# Patient Record
Sex: FEMALE | Race: WHITE | NOT HISPANIC OR LATINO | Employment: OTHER | URBAN - NONMETROPOLITAN AREA
[De-identification: names, ages, dates, MRNs, and addresses within clinical notes are randomized per-mention and may not be internally consistent; named-entity substitution may affect disease eponyms.]

---

## 2017-03-14 ENCOUNTER — TELEPHONE (OUTPATIENT)
Dept: OBGYN | Facility: OTHER | Age: 27
End: 2017-03-14

## 2017-03-14 NOTE — TELEPHONE ENCOUNTER
Positive pregnancy test : yes  LMP : 683787 GA: 5 5/7  Prenatal vitamins?: Yes, OTC PNV. Will check folate to be sure 800 mcg or more.  Bleeding?: no  Cramping?: no  1-sided pelvic pain?: no  Advised patient to be seen ASAP if any of the above symptoms.  [facility] appt scheduled with Dr. Benavidez on 3/29 @ 3:10.

## 2017-03-29 ENCOUNTER — PRENATAL OFFICE VISIT (OUTPATIENT)
Dept: OBGYN | Facility: OTHER | Age: 27
End: 2017-03-29
Attending: OBSTETRICS & GYNECOLOGY
Payer: COMMERCIAL

## 2017-03-29 VITALS
BODY MASS INDEX: 21.97 KG/M2 | SYSTOLIC BLOOD PRESSURE: 112 MMHG | HEART RATE: 60 BPM | DIASTOLIC BLOOD PRESSURE: 64 MMHG | WEIGHT: 140 LBS | HEIGHT: 67 IN

## 2017-03-29 DIAGNOSIS — Z34.01 ENCOUNTER FOR SUPERVISION OF NORMAL FIRST PREGNANCY IN FIRST TRIMESTER: Primary | ICD-10-CM

## 2017-03-29 PROCEDURE — 99207 ZZC COMPLICATED OB VISIT: CPT | Mod: 25 | Performed by: OBSTETRICS & GYNECOLOGY

## 2017-03-29 PROCEDURE — 76817 TRANSVAGINAL US OBSTETRIC: CPT | Performed by: OBSTETRICS & GYNECOLOGY

## 2017-03-29 NOTE — PROGRESS NOTES
"Here for doc of dates and viability  /64  Pulse 60  Ht 5' 7\" (1.702 m)  Wt 140 lb (63.5 kg)  LMP 02/02/2017  BMI 21.93 kg/m2      Uterus: normal   Gest. Sac: reg  Yolk Sac: P  Fetal Pole: 7w4d  FHT: +  Fluid: normal  Adnexa: cl on rt  Gest Age: 7w4d  Sc = dates  Done by Vicky Benavidez MD    A: IUP    P: rto 2 wks  rto NOB  Books  11 wk NT etc.    Greater than  25 minutes were spent counseling this patient face to face in addition to the ultrasound.      "

## 2017-03-29 NOTE — MR AVS SNAPSHOT
After Visit Summary   3/29/2017    Johana Duff    MRN: [id number]           Patient Information     Date Of Birth          1990        Visit Information        Provider Department      3/29/2017 3:10 PM Vicky Benavidez MD [facility] [place]        Today's Diagnoses     Encounter for supervision of normal first pregnancy in first trimester    -  1      Care Instructions    1st trimester screening blood work and nuchal translucency ultrasound @ 11-13 6/7 weeks. You will receive a call from ultrasound to schedule this appt. Please call the nurse at [phone] if you are not contacted by 12 weeks with an appointment time.  MSAFP in lab @ 15-16 weeks.  Level 2 ultrasound @ 18-22 weeks. You will receive a call from ultrasound to make this appt. Please contact the nurse if you have not been contacted with an appointment time by 17 weeks.     Return in 2 weeks for a short appointment, and in 4-6 weeks for a long New OB appt.                Follow-ups after your visit        Your next 10 appointments already scheduled     May 01, 2017 10:00 AM CDT   (Arrive by 9:45 AM)   Office Visit with MD [facility] Pelaez ([facility])    [address]  [facility] [address]   [phone]           Bring a current list of meds and any records pertaining to this visit.  For Physicals, please bring immunization records and any forms needing to be filled out.  Please arrive 10 minutes early to complete paperwork.              Who to contact     If you have questions or need follow up information about today's clinic visit or your schedule please contact [facility] directly at [phone].  Normal or non-critical lab and imaging results will be communicated to you by MyChart, letter or phone within 4 business days after the clinic has received the results. If you do not hear from us within 7 days, please contact the clinic through MyChart or phone. If you have  "a critical or abnormal lab result, we will notify you by phone as soon as possible.  Submit refill requests through TraitWare or call your pharmacy and they will forward the refill request to us. Please allow 3 business days for your refill to be completed.          Additional Information About Your Visit        Meridiumhart Information     TraitWare lets you send messages to your doctor, view your test results, renew your prescriptions, schedule appointments and more. To sign up, go to [email or web address] . Click on \"Log in\" on the left side of the screen, which will take you to the Welcome page. Then click on \"Sign up Now\" on the right side of the page.     You will be asked to enter the access code listed below, as well as some personal information. Please follow the directions to create your username and password.     Your access code is: [email or web address]  Expires: 2017  4:37 PM     Your access code will  in 90 days. If you need help or a new code, please call your [place] clinic or [phone].        Care EveryWhere ID     This is your Care EveryWhere ID. This could be used by other organizations to access your [place] medical records  [id number]        Your Vitals Were     Pulse Height Last Period BMI (Body Mass Index)          60 5' 7\" (1.702 m) 2017 21.93 kg/m2         Blood Pressure from Last 3 Encounters:   17 112/64   16 108/60   16 112/62    Weight from Last 3 Encounters:   17 140 lb (63.5 kg)   16 140 lb (63.5 kg)   16 137 lb (62.1 kg)              We Performed the Following     US OB (Clinic Only)        Primary Care Provider Office Phone # Fax #    MARIA LUISA Desir [phone]       [facility] [address]  [place] [address]        Thank you!     Thank you for choosing [facility]  for your care. Our goal is always to provide you with excellent care. Hearing back from our patients is one way we can " continue to improve our services. Please take a few minutes to complete the written survey that you may receive in the mail after your visit with us. Thank you!             Your Updated Medication List - Protect others around you: Learn how to safely use, store and throw away your medicines at www.disposemymeds.org.          This list is accurate as of: 3/29/17  4:37 PM.  Always use your most recent med list.                   Brand Name Dispense Instructions for use    PRENATAL 1 PO

## 2017-03-29 NOTE — PATIENT INSTRUCTIONS
1st trimester screening blood work and nuchal translucency ultrasound @ 11-13 6/7 weeks. You will receive a call from ultrasound to schedule this appt. Please call the nurse at [phone] if you are not contacted by 12 weeks with an appointment time.  MSAFP in lab @ 15-16 weeks.  Level 2 ultrasound @ 18-22 weeks. You will receive a call from ultrasound to make this appt. Please contact the nurse if you have not been contacted with an appointment time by 17 weeks.     Return in 2 weeks for a short appointment, and in 4-6 weeks for a long New OB appt.

## 2017-03-30 ASSESSMENT — ANXIETY QUESTIONNAIRES
1. FEELING NERVOUS, ANXIOUS, OR ON EDGE: NOT AT ALL
6. BECOMING EASILY ANNOYED OR IRRITABLE: NOT AT ALL
GAD7 TOTAL SCORE: 0
7. FEELING AFRAID AS IF SOMETHING AWFUL MIGHT HAPPEN: NOT AT ALL
3. WORRYING TOO MUCH ABOUT DIFFERENT THINGS: NOT AT ALL
5. BEING SO RESTLESS THAT IT IS HARD TO SIT STILL: NOT AT ALL
2. NOT BEING ABLE TO STOP OR CONTROL WORRYING: NOT AT ALL

## 2017-03-30 ASSESSMENT — PATIENT HEALTH QUESTIONNAIRE - PHQ9: 5. POOR APPETITE OR OVEREATING: NOT AT ALL

## 2017-03-31 ASSESSMENT — PATIENT HEALTH QUESTIONNAIRE - PHQ9: SUM OF ALL RESPONSES TO PHQ QUESTIONS 1-9: 3

## 2017-03-31 ASSESSMENT — ANXIETY QUESTIONNAIRES: GAD7 TOTAL SCORE: 0

## 2017-04-12 ENCOUNTER — PRENATAL OFFICE VISIT (OUTPATIENT)
Dept: OBGYN | Facility: OTHER | Age: 27
End: 2017-04-12
Attending: OBSTETRICS & GYNECOLOGY
Payer: COMMERCIAL

## 2017-04-12 VITALS
DIASTOLIC BLOOD PRESSURE: 62 MMHG | WEIGHT: 139 LBS | BODY MASS INDEX: 21.82 KG/M2 | SYSTOLIC BLOOD PRESSURE: 92 MMHG | HEIGHT: 67 IN

## 2017-04-12 DIAGNOSIS — Z34.01 ENCOUNTER FOR SUPERVISION OF NORMAL FIRST PREGNANCY IN FIRST TRIMESTER: Primary | ICD-10-CM

## 2017-04-12 PROCEDURE — 99207 ZZC PRENATAL VISIT: CPT | Mod: 25 | Performed by: OBSTETRICS & GYNECOLOGY

## 2017-04-12 PROCEDURE — 76817 TRANSVAGINAL US OBSTETRIC: CPT | Performed by: OBSTETRICS & GYNECOLOGY

## 2017-04-12 NOTE — PROGRESS NOTES
"Here for doc of dates and viability  BP 92/62  Ht 5' 7\" (1.702 m)  Wt 139 lb (63 kg)  LMP 02/02/2017  BMI 21.77 kg/m2      Uterus: normal  Gest. Sac: reg  Yolk Sac: p  Fetal Pole: 10w3d with good fm  FHT: +  Fluid: normal    Gest Age: 10w3d  Sc = dates  Done by Vicky Benavidez MD    A: IUP    P: rto NOB  11 wk NT etc    Greater than  15 minutes were spent counseling this patient face to face in addition to the ultrasound.      "

## 2017-04-12 NOTE — MR AVS SNAPSHOT
After Visit Summary   4/12/2017    Johana Duff    MRN: [id number]           Patient Information     Date Of Birth          1990        Visit Information        Provider Department      4/12/2017 2:20 PM Vicky Benavidez MD [facility]        Today's Diagnoses     Encounter for supervision of normal first pregnancy in first trimester    -  1      Care Instructions    Keep nuchal translucency ultrasound and 1st trimester screen appointment for 4/24.  Return for New OB visit.        Follow-ups after your visit        Your next 10 appointments already scheduled     Apr 24, 2017  3:00 PM CDT   Radiology with HI ULTRASOUND 2   HI Ultrasound ([facility] )    [address] [place] [address]   [phone]            May 01, 2017 10:00 AM CDT   (Arrive by 9:45 AM)   New Prenatal with Vicky Benavidez MD   [facility] ([facility])    [address] [facility] [address]   [phone]            Jun 20, 2017  2:00 PM CDT   Radiology with HI ULTRASOUND 2   HI Ultrasound ([facility] )    [address] [place] [address]   [phone]              Who to contact     If you have questions or need follow up information about today's clinic visit or your schedule please contact [facility] directly at [phone].  Normal or non-critical lab and imaging results will be communicated to you by MyChart, letter or phone within 4 business days after the clinic has received the results. If you do not hear from us within 7 days, please contact the clinic through MyChart or phone. If you have a critical or abnormal lab result, we will notify you by phone as soon as possible.  Submit refill requests through HDmessaging or call your pharmacy and they will forward the refill request to us. Please allow 3 business days for your refill to be completed.          Additional Information About Your Visit        MyChart Information      "easy2map lets you send messages to your doctor, view your test results, renew your prescriptions, schedule appointments and more. To sign up, go to [email or web address] . Click on \"Log in\" on the left side of the screen, which will take you to the Welcome page. Then click on \"Sign up Now\" on the right side of the page.     You will be asked to enter the access code listed below, as well as some personal information. Please follow the directions to create your username and password.     Your access code is: [email or web address]  Expires: 2017  4:37 PM     Your access code will  in 90 days. If you need help or a new code, please call your [place] clinic or [phone].        Care EveryWhere ID     This is your Care EveryWhere ID. This could be used by other organizations to access your [place] medical records  [id number]        Your Vitals Were     Height Last Period BMI (Body Mass Index)             5' 7\" (1.702 m) 2017 21.77 kg/m2          Blood Pressure from Last 3 Encounters:   17 92/62   17 112/64   16 108/60    Weight from Last 3 Encounters:   17 139 lb (63 kg)   17 140 lb (63.5 kg)   16 140 lb (63.5 kg)              Today, you had the following     No orders found for display       Primary Care Provider Office Phone # Fax #    MARIA LUISA Desir [phone]       [address] [address]        Thank you!     Thank you for choosing [facility]  for your care. Our goal is always to provide you with excellent care. Hearing back from our patients is one way we can continue to improve our services. Please take a few minutes to complete the written survey that you may receive in the mail after your visit with us. Thank you!             Your Updated Medication List - Protect others around you: Learn how to safely use, store and throw away your medicines at www.disposemymeds.org.          This " list is accurate as of: 4/12/17  3:27 PM.  Always use your most recent med list.                   Brand Name Dispense Instructions for use    PRENATAL 1 PO

## 2017-04-12 NOTE — PATIENT INSTRUCTIONS
Keep nuchal translucency ultrasound and 1st trimester screen appointment for 4/24.  Return for New OB visit.

## 2017-04-24 ENCOUNTER — HOSPITAL ENCOUNTER (OUTPATIENT)
Dept: ULTRASOUND IMAGING | Facility: HOSPITAL | Age: 27
Discharge: HOME | End: 2017-04-24
Attending: OBSTETRICS & GYNECOLOGY | Admitting: OBSTETRICS & GYNECOLOGY
Payer: COMMERCIAL

## 2017-04-24 PROCEDURE — 36415 COLL VENOUS BLD VENIPUNCTURE: CPT

## 2017-04-24 PROCEDURE — 76813 OB US NUCHAL MEAS 1 GEST: CPT | Mod: TC

## 2017-05-01 ENCOUNTER — PRENATAL OFFICE VISIT (OUTPATIENT)
Dept: OBGYN | Facility: OTHER | Age: 27
End: 2017-05-01
Attending: OBSTETRICS & GYNECOLOGY
Payer: COMMERCIAL

## 2017-05-01 VITALS
WEIGHT: 141 LBS | DIASTOLIC BLOOD PRESSURE: 52 MMHG | SYSTOLIC BLOOD PRESSURE: 112 MMHG | BODY MASS INDEX: 22.13 KG/M2 | HEIGHT: 67 IN

## 2017-05-01 DIAGNOSIS — Z34.01 ENCOUNTER FOR SUPERVISION OF NORMAL FIRST PREGNANCY IN FIRST TRIMESTER: Primary | ICD-10-CM

## 2017-05-01 LAB
ALBUMIN UR-MCNC: NEGATIVE MG/DL
APPEARANCE UR: CLEAR
BACTERIA #/AREA URNS HPF: ABNORMAL /HPF
BILIRUB UR QL STRIP: NEGATIVE
C TRACH DNA SPEC QL PROBE+SIG AMP: NORMAL
COLOR UR AUTO: YELLOW
ERYTHROCYTE [DISTWIDTH] IN BLOOD BY AUTOMATED COUNT: 11.9 % (ref 10–15)
GLUCOSE UR STRIP-MCNC: NEGATIVE MG/DL
HCT VFR BLD AUTO: 40.5 % (ref 35–47)
HGB BLD-MCNC: 14.2 G/DL (ref 11.7–15.7)
HGB UR QL STRIP: NEGATIVE
KETONES UR STRIP-MCNC: NEGATIVE MG/DL
LEUKOCYTE ESTERASE UR QL STRIP: NEGATIVE
MCH RBC QN AUTO: 31.6 PG (ref 26.5–33)
MCHC RBC AUTO-ENTMCNC: 35.1 G/DL (ref 31.5–36.5)
MCV RBC AUTO: 90 FL (ref 78–100)
MUCOUS THREADS #/AREA URNS LPF: PRESENT /LPF
N GONORRHOEA DNA SPEC QL PROBE+SIG AMP: NORMAL
NITRATE UR QL: NEGATIVE
PH UR STRIP: 5 PH (ref 4.7–8)
PLATELET # BLD AUTO: 232 10E9/L (ref 150–450)
RBC # BLD AUTO: 4.5 10E12/L (ref 3.8–5.2)
RBC #/AREA URNS AUTO: <1 /HPF (ref 0–2)
SP GR UR STRIP: 1.01 (ref 1–1.03)
URN SPEC COLLECT METH UR: ABNORMAL
UROBILINOGEN UR STRIP-MCNC: NORMAL MG/DL (ref 0–2)
WBC # BLD AUTO: 12.8 10E9/L (ref 4–11)
WBC #/AREA URNS AUTO: 1 /HPF (ref 0–2)

## 2017-05-01 PROCEDURE — 99207 ZZC FIRST OB VISIT: CPT | Performed by: OBSTETRICS & GYNECOLOGY

## 2017-05-01 PROCEDURE — 81001 URINALYSIS AUTO W/SCOPE: CPT | Performed by: OBSTETRICS & GYNECOLOGY

## 2017-05-01 PROCEDURE — 36415 COLL VENOUS BLD VENIPUNCTURE: CPT | Performed by: OBSTETRICS & GYNECOLOGY

## 2017-05-01 PROCEDURE — 85027 COMPLETE CBC AUTOMATED: CPT | Performed by: OBSTETRICS & GYNECOLOGY

## 2017-05-01 NOTE — PROGRESS NOTES
[facility] Website verified for patient immunization history.  Immunization History   Administered Date(s) Administered     Tdap (Adacel,Boostrix) 09/07/2011    patient signed release for immunization records   12 Week Visit    Patient education provided on the following:  Benefits of breastfeeding  Importance of early skin-to-skin contact    We reviewed the [place] [employer] Prenatal Toolkit in the [facility] Resource Book.  Patient questions and concerns addressed and reviewed. Support and encouragement provided.  HERNANDEZ TANG

## 2017-05-01 NOTE — MR AVS SNAPSHOT
After Visit Summary   5/1/2017    Johana Duff    MRN: [id number]           Patient Information     Date Of Birth          1990        Visit Information        Provider Department      5/1/2017 10:00 AM Vicky Benavidez MD [facility]        Today's Diagnoses     Encounter for supervision of normal first pregnancy in first trimester    -  1      Care Instructions    Lab today.  1st trimester screening blood work and nuchal translucency ultrasound @ 11-13 6/7 weeks. You will receive a call from ultrasound to schedule this appt. Please call the nurse at [phone] if you are not contacted by 12 weeks with an appointment time.  MSAFP in lab @ 15-16 weeks. Can do with next OB appointment.  Level 2 ultrasound @ 18-22 weeks. You will receive a call from ultrasound to make this appt. Please contact the nurse if you have not been contacted with an appointment time by 17 weeks.   Tdap vaccine at 27 weeks  Flu shot in fall  Return to clinic in 4 weeks. Stop at lab first for MSAFP test.           Follow-ups after your visit        Your next 10 appointments already scheduled     Jun 20, 2017  2:00 PM CDT   Radiology with HI ULTRASOUND 2   [facility] Ultrasound ([facility] )    [address] [place] [address]   [phone]              Who to contact     If you have questions or need follow up information about today's clinic visit or your schedule please contact [facility] directly at [phone].  Normal or non-critical lab and imaging results will be communicated to you by MyChart, letter or phone within 4 business days after the clinic has received the results. If you do not hear from us within 7 days, please contact the clinic through MyChart or phone. If you have a critical or abnormal lab result, we will notify you by phone as soon as possible.  Submit refill requests through Appington or call your pharmacy and they will forward the refill request to us.  "Please allow 3 business days for your refill to be completed.          Additional Information About Your Visit        MyChart Information     Hanzo Archiveshart lets you send messages to your doctor, view your test results, renew your prescriptions, schedule appointments and more. To sign up, go to [email or web address] . Click on \"Log in\" on the left side of the screen, which will take you to the Welcome page. Then click on \"Sign up Now\" on the right side of the page.     You will be asked to enter the access code listed below, as well as some personal information. Please follow the directions to create your username and password.     Your access code is: [email or web address]  Expires: 2017  4:37 PM     Your access code will  in 90 days. If you need help or a new code, please call your [place] clinic or [phone].        Care EveryWhere ID     This is your Care EveryWhere ID. This could be used by other organizations to access your [place] medical records  [id number]        Your Vitals Were     Height Last Period BMI (Body Mass Index)             5' 7\" (1.702 m) 2017 22.08 kg/m2          Blood Pressure from Last 3 Encounters:   17 112/52   17 92/62   17 112/64    Weight from Last 3 Encounters:   17 141 lb (64 kg)   17 139 lb (63 kg)   17 140 lb (63.5 kg)              We Performed the Following     CBC with platelets     Chlamydia trachomatis PCR     Neisseria gonorrhoeae PCR     UA with Microscopic reflex to Culture        Primary Care Provider Office Phone # Fax #    MARIA LUISA Desir [phone]       [facility] [address] [address]        Thank you!     Thank you for choosing [facility]  for your care. Our goal is always to provide you with excellent care. Hearing back from our patients is one way we can continue to improve our services. Please take a few minutes to complete the written survey that you may " receive in the mail after your visit with us. Thank you!             Your Updated Medication List - Protect others around you: Learn how to safely use, store and throw away your medicines at www.disposemymeds.org.          This list is accurate as of: 5/1/17 10:23 AM.  Always use your most recent med list.                   Brand Name Dispense Instructions for use    PRENATAL 1 PO

## 2017-05-01 NOTE — PATIENT INSTRUCTIONS
Lab today.  1st trimester screening blood work and nuchal translucency ultrasound @ 11-13 6/7 weeks. You will receive a call from ultrasound to schedule this appt. Please call the nurse at [phone] if you are not contacted by 12 weeks with an appointment time.  MSAFP in lab @ 15-16 weeks. Can do with next OB appointment.  Level 2 ultrasound @ 18-22 weeks. You will receive a call from ultrasound to make this appt. Please contact the nurse if you have not been contacted with an appointment time by 17 weeks.   Tdap vaccine at 27 weeks  Flu shot in fall  Return to clinic in 4 weeks. Stop at lab first for MSAFP test.

## 2017-05-01 NOTE — PROGRESS NOTES
NEW OB VISIT  Johana Duff is a 26 year old  at 12w4d presenting for a new ob visit.      Currently taking PNV? y    MEDICAL HISTORY:  Diabetes: n  Hypertension: n  Heart Disease: n  Autoimmune disorder: n  Kidney Disease/UTI: n  Neurologic Disease/Epilepsy: n  Psychiatric Disease: n  Depression/Postpartum Depression: n  Varicositites/Phlebitis: n  Hepatitis/Liver Disease: n  Thyroid Dysfunction: n  Trauma/Violence: n  History of Blood Transfusion: n  Tobacco Use: n  Alcohol Use: n  Illicit/Recreational Drugs: n  D (Rh Sensitized): n  Pulmonary Disease (TB/Asthma): n  Drug/Latex Allergies/Reactions: food dyes  Breast: n  GYN Surgery: n  Operations/Hospitalizations: wisdom teeth  Anesthetic Complications: n  History of Abnormal Pap: n  Uterine Anomalies/CHRISSIE: n  Infertility: n  ART Treatment: n  Relevant Family History: n   Other/Comments: n    INFECTION HISTORY:  Live with someone with TB or exposed to TB: n  Patient or Partner has history of Genital Herpes: n  Rash or viral illness or fever since LMP: n  Hepatitis B or C: n  History of STI (Gonorrhea, Chlamydia, HPV, HIV, Syphilis): n  Zika exposure n  Other: n  Cats n    BABY DOC: MV             Breast feeding: y RD y      IMMUNIZATION HISTORY:  Chicken Pox: y  Flu Vaccine:  In fall  Pneumococcal if smoker or RAD:  na  Tdap: in 3  Gardasil: n  Other/comments: n    FAMILY HISTORY  Diabetes: n  Hypertension: mgf  CVA/Stroke: mgm  Lupus: n  Cancers: Breast  n ovarian n,colon n,uterine: n           Genetics Screening/Teratology Counseling:  Includes Patient, Baby's Father, or anyone in either family with:  Patient's age 35 years or older as of estimated date of delivery:  n  Thalassemia: MCV less than 80: n  Neural Tube defects: n  Congenital Heart Defects:fob with heart murmur he grew out of  Down syndrome: n  Diaz-Sachs: n  Canavan Disease: n  Familial Dysautonomia: n  Sickle Cell Disease or Trait: n  Hemophilia or other blood disorders: n  Muscular  "Dystrophy: n  Cystic Fibrosis: n  Yazoo's Chorea: n  Mental Retardation or Autism: n  Other genetic or chromosomal disorders: n  Maternal Metabolic Disorder (Type 1 DM, PKU): n  Patient or baby's father with birth defects not listed above: n  Recurrent pregnancy loss or stillbirth: n  Medications (Supplements, drugs)/ Illicit/ Recreational drugs/ Alcohol since LMP: n  Other/Comments: n    Review Of Systems:   C:     NEGATIVE for fever, chills, change in weight  I:       NEGATIVE for worrisome rashes, moles or lesions  E:     NEGATIVE for vision changes or irritation  E/M: NEGATIVE for ear, mouth and throat problems  R:     NEGATIVE for significant cough or SOB  CV:   NEGATIVE for chest pain, palpitations or peripheral edema  GI:     NEGATIVE for unusual nausea, abdominal pain, heartburn, or change in bowel   :   NEGATIVE for frequency, dysuria, hematuria, vaginal discharge or bleeding  M:     NEGATIVE for significant arthralgias or myalgia  N:      NEGATIVE for weakness, dizziness or paresthesias  E:      NEGATIVE for temperature intolerance, skin/hair changes  P:      NEGATIVE for changes in mood or affect.     PHYSICAL EXAM:   /52  Ht 5' 7\" (1.702 m)  Wt 141 lb (64 kg)  LMP 02/02/2017  BMI 22.08 kg/m2   BMI: Body mass index is 22.08 kg/(m^2).  Constitutional: healthy, alert and no distress  Head: Normocephalic. No masses, lesions, tenderness or abnormalities  Neck: Neck supple. Trachea midline. No adenopathy. Thyroid symmetric, normal size.   Cardiovascular: RRR.   Respiratory: lungs clear   Breast: Breasts reveal mild symmetric fibrocystic densities, but there are no dominant, discrete, fixed or suspicious masses found.  Gastrointestinal: Abdomen soft, non-tender, non-distended. No masses, organomegaly.  Pelvic:  Vulva:  No external lesions, normal female hair distribution, no inguinal adenopathy.    Urethra:  Midline, non-tender, well supported, no discharge  Vagina:  Moist, pink, no abnormal " discharge, no lesions  Uterus:   Gravid , non-tender  Ovaries:  No masses appreciated  Rectal Exam: deferred    Musculoskeletal: extremities normal  Skin: no suspicious lesions or rashes  Psychiatric: Affect appropriate, cooperative,mentation appears normal.     Risk assessment done. Level is   mod    ASSESSMENT:   IUP    PLAN:  HP,mscc with micro, transfer CT to chart  11-13 weeks 1st trimester NT/Bloodwork  16 wk MSAFP  Cell free DNA discussed  Level II Ultrasound at 20 weeks   Tdap at 27 weeks  Check MIIC  Flu shot in fall  RTO 4 wks and do MSAFP    Greater than 25 were spent in face to face counseling and interview by me for this initial new ob visit.  Vicky Benavidez MD

## 2017-05-30 ENCOUNTER — PRENATAL OFFICE VISIT (OUTPATIENT)
Dept: OBGYN | Facility: OTHER | Age: 27
End: 2017-05-30
Attending: OBSTETRICS & GYNECOLOGY
Payer: COMMERCIAL

## 2017-05-30 VITALS — DIASTOLIC BLOOD PRESSURE: 58 MMHG | SYSTOLIC BLOOD PRESSURE: 112 MMHG | WEIGHT: 150 LBS | BODY MASS INDEX: 23.49 KG/M2

## 2017-05-30 DIAGNOSIS — Z34.01 ENCOUNTER FOR SUPERVISION OF NORMAL FIRST PREGNANCY IN FIRST TRIMESTER: ICD-10-CM

## 2017-05-30 DIAGNOSIS — Z34.01 ENCOUNTER FOR SUPERVISION OF NORMAL FIRST PREGNANCY IN FIRST TRIMESTER: Primary | ICD-10-CM

## 2017-05-30 PROCEDURE — 82105 ALPHA-FETOPROTEIN SERUM: CPT | Mod: 90 | Performed by: OBSTETRICS & GYNECOLOGY

## 2017-05-30 PROCEDURE — 99207 ZZC PRENATAL VISIT: CPT | Performed by: OBSTETRICS & GYNECOLOGY

## 2017-05-30 PROCEDURE — 99000 SPECIMEN HANDLING OFFICE-LAB: CPT | Performed by: OBSTETRICS & GYNECOLOGY

## 2017-05-30 PROCEDURE — 36415 COLL VENOUS BLD VENIPUNCTURE: CPT | Performed by: OBSTETRICS & GYNECOLOGY

## 2017-05-30 NOTE — MR AVS SNAPSHOT
"              After Visit Summary   5/30/2017    Johana Duff    MRN: [id number]           Patient Information     Date Of Birth          1990        Visit Information        Provider Department      5/30/2017 3:00 PM Vicky Benavidez MD [facility]        Today's Diagnoses     Encounter for supervision of normal first pregnancy in first trimester    -  1      Care Instructions    Return to clinic in 4 weeks.            Follow-ups after your visit        Your next 10 appointments already scheduled     Jun 20, 2017  2:00 PM CDT   Radiology with HI ULTRASOUND 2   HI Ultrasound ([facility] )    [address] [place] [address]   [phone]              Who to contact     If you have questions or need follow up information about today's clinic visit or your schedule please contact [facility] directly at [phone].  Normal or non-critical lab and imaging results will be communicated to you by MyChart, letter or phone within 4 business days after the clinic has received the results. If you do not hear from us within 7 days, please contact the clinic through MyChart or phone. If you have a critical or abnormal lab result, we will notify you by phone as soon as possible.  Submit refill requests through [email or web address] or call your pharmacy and they will forward the refill request to us. Please allow 3 business days for your refill to be completed.          Additional Information About Your Visit        Bloodhoundhart Information     [email or web address] lets you send messages to your doctor, view your test results, renew your prescriptions, schedule appointments and more. To sign up, go to [email or web address] . Click on \"Log in\" on the left side of the screen, which will take you to the Welcome page. Then click on \"Sign up Now\" on the right side of the page.     You will be asked to enter the access code listed below, as well as some personal information. Please follow the directions to " create your username and password.     Your access code is: [email or web address]  Expires: 2017  4:37 PM     Your access code will  in 90 days. If you need help or a new code, please call your [place] clinic or [phone].        Care EveryWhere ID     This is your Care EveryWhere ID. This could be used by other organizations to access your [place] medical records  [id number]        Your Vitals Were     Last Period BMI (Body Mass Index)                2017 23.49 kg/m2           Blood Pressure from Last 3 Encounters:   17 112/58   17 112/52   17 92/62    Weight from Last 3 Encounters:   17 150 lb (68 kg)   17 141 lb (64 kg)   17 139 lb (63 kg)              Today, you had the following     No orders found for display       Primary Care Provider Office Phone # Fax #    MARIA LUISA Desir [phone]       [facility] [address] [place] [address]        Thank you!     Thank you for choosing [facility]  for your care. Our goal is always to provide you with excellent care. Hearing back from our patients is one way we can continue to improve our services. Please take a few minutes to complete the written survey that you may receive in the mail after your visit with us. Thank you!             Your Updated Medication List - Protect others around you: Learn how to safely use, store and throw away your medicines at www.disposemymeds.org.          This list is accurate as of: 17  3:53 PM.  Always use your most recent med list.                   Brand Name Dispense Instructions for use    PRENATAL 1 PO

## 2017-06-02 LAB
# FETUSES US: NORMAL
AFP ADJ MOM AMN: 0.97
AFP SERPL-MCNC: 37 NG/ML
AGE - REPORTED: 29
DATING METHOD: NORMAL
DIABETIC AT CONCEPTION: NO
FAMILY MEMBER DISEASES HX: NO
FAMILY MEMBER DISEASES HX: NO
GA METHOD: NORMAL
GA: 16.71 WK
HX OF HEREDITARY DISORDERS: NO
IDDM PATIENT QL: NO
INTEGRATED SCN PATIENT-IMP: NORMAL
LMP START DATE: NORMAL
PREV HX CHROMOSOME ABNORMALITY: NO
SPECIMEN DRAWN SERPL: NORMAL
TWINS: NORMAL

## 2017-06-20 ENCOUNTER — HOSPITAL ENCOUNTER (OUTPATIENT)
Dept: ULTRASOUND IMAGING | Facility: CLINIC | Age: 27
End: 2017-06-20
Attending: OBSTETRICS & GYNECOLOGY
Payer: COMMERCIAL

## 2017-06-20 ENCOUNTER — HOSPITAL ENCOUNTER (OUTPATIENT)
Dept: ULTRASOUND IMAGING | Facility: HOSPITAL | Age: 27
Discharge: HOME | End: 2017-06-20
Attending: OBSTETRICS & GYNECOLOGY | Admitting: OBSTETRICS & GYNECOLOGY
Payer: COMMERCIAL

## 2017-06-20 DIAGNOSIS — O26.90 PREGNANCY RELATED CONDITION, UNSPECIFIED TRIMESTER: ICD-10-CM

## 2017-06-20 PROCEDURE — 76811 OB US DETAILED SNGL FETUS: CPT | Mod: TC

## 2017-07-05 ENCOUNTER — PRENATAL OFFICE VISIT (OUTPATIENT)
Dept: OBGYN | Facility: OTHER | Age: 27
End: 2017-07-05
Attending: OBSTETRICS & GYNECOLOGY
Payer: COMMERCIAL

## 2017-07-05 VITALS
HEIGHT: 67 IN | WEIGHT: 154 LBS | SYSTOLIC BLOOD PRESSURE: 102 MMHG | BODY MASS INDEX: 24.17 KG/M2 | DIASTOLIC BLOOD PRESSURE: 62 MMHG

## 2017-07-05 DIAGNOSIS — Z34.01 ENCOUNTER FOR SUPERVISION OF NORMAL FIRST PREGNANCY IN FIRST TRIMESTER: Primary | ICD-10-CM

## 2017-07-05 PROCEDURE — 99207 ZZC PRENATAL VISIT: CPT | Performed by: OBSTETRICS & GYNECOLOGY

## 2017-07-05 NOTE — MR AVS SNAPSHOT
"              After Visit Summary   2017    Johana Duff    MRN: [id number]           Patient Information     Date Of Birth          1990        Visit Information        Provider Department      2017 1:30 PM iVcky Benavidez MD [facility]        Today's Diagnoses     Encounter for supervision of normal first pregnancy in first trimester    -  1       Follow-ups after your visit        Who to contact     If you have questions or need follow up information about today's clinic visit or your schedule please contact [facility] directly at [phone].  Normal or non-critical lab and imaging results will be communicated to you by 3KeyIthart, letter or phone within 4 business days after the clinic has received the results. If you do not hear from us within 7 days, please contact the clinic through Seat 14At or phone. If you have a critical or abnormal lab result, we will notify you by phone as soon as possible.  Submit refill requests through LIVELENZ or call your pharmacy and they will forward the refill request to us. Please allow 3 business days for your refill to be completed.          Additional Information About Your Visit        MyChart Information     LIVELENZ lets you send messages to your doctor, view your test results, renew your prescriptions, schedule appointments and more. To sign up, go to [email or web address] . Click on \"Log in\" on the left side of the screen, which will take you to the Welcome page. Then click on \"Sign up Now\" on the right side of the page.     You will be asked to enter the access code listed below, as well as some personal information. Please follow the directions to create your username and password.     Your access code is: [email or web address]  Expires: 10/3/2017  2:05 PM     Your access code will  in 90 days. If you need help or a new code, please call your [place] clinic or [phone].        Care EveryWhere ID     This is your Care " "EveryWhere ID. This could be used by other organizations to access your [place] medical records  [id number]        Your Vitals Were     Height Last Period BMI (Body Mass Index)             5' 7\" (1.702 m) 02/02/2017 24.12 kg/m2          Blood Pressure from Last 3 Encounters:   07/05/17 102/62   05/30/17 112/58   05/01/17 112/52    Weight from Last 3 Encounters:   07/05/17 154 lb (69.9 kg)   05/30/17 150 lb (68 kg)   05/01/17 141 lb (64 kg)              Today, you had the following     No orders found for display       Primary Care Provider Office Phone # Fax #    MARIA LUISA Desir [phone]       [facility] [address] [facility] [address]  [facility] [address]        Equal Access to Services     ASAEL GARCIA : Hadii aad ku hadasho Soomaali, waaxda luqadaha, qaybta kaalmada adeegyada, suellen looneyin haymarilynn brandy finch . So [facility] [phone].    ATENCIÓN: Si habla español, tiene a thomas disposición servicios gratuitos de asistencia lingüística. Llame al [phone].    We comply with applicable federal civil rights laws and [place] laws. We do not discriminate on the basis of race, color, national origin, age, disability sex, sexual orientation or gender identity.            Thank you!     Thank you for choosing [facility]  for your care. Our goal is always to provide you with excellent care. Hearing back from our patients is one way we can continue to improve our services. Please take a few minutes to complete the written survey that you may receive in the mail after your visit with us. Thank you!             Your Updated Medication List - Protect others around you: Learn how to safely use, store and throw away your medicines at www.disposemymeds.org.          This list is accurate as of: 7/5/17  2:05 PM.  Always use your most recent med list.                   Brand Name Dispense Instructions for use Diagnosis    PRENATAL 1 PO       Annual physical exam, Family planning    "

## 2017-07-05 NOTE — NURSING NOTE
"Chief Complaint   Patient presents with     Prenatal Care       Initial /62  Ht 5' 7\" (1.702 m)  Wt 154 lb (69.9 kg)  LMP 02/02/2017  BMI 24.12 kg/m2 Estimated body mass index is 24.12 kg/(m^2) as calculated from the following:    Height as of this encounter: 5' 7\" (1.702 m).    Weight as of this encounter: 154 lb (69.9 kg).  Medication Reconciliation: complete     LEE PIERRE      "

## 2017-07-19 ENCOUNTER — PRENATAL OFFICE VISIT (OUTPATIENT)
Dept: OBGYN | Facility: OTHER | Age: 27
End: 2017-07-19
Attending: ADVANCED PRACTICE MIDWIFE
Payer: COMMERCIAL

## 2017-07-19 VITALS
SYSTOLIC BLOOD PRESSURE: 106 MMHG | HEIGHT: 67 IN | OXYGEN SATURATION: 96 % | DIASTOLIC BLOOD PRESSURE: 60 MMHG | BODY MASS INDEX: 24.48 KG/M2 | WEIGHT: 156 LBS | HEART RATE: 85 BPM

## 2017-07-19 DIAGNOSIS — Z34.02 ENCOUNTER FOR SUPERVISION OF NORMAL FIRST PREGNANCY IN SECOND TRIMESTER: Primary | ICD-10-CM

## 2017-07-19 PROCEDURE — 99207 ZZC PRENATAL VISIT: CPT | Performed by: ADVANCED PRACTICE MIDWIFE

## 2017-07-19 ASSESSMENT — PAIN SCALES - GENERAL: PAINLEVEL: NO PAIN (0)

## 2017-07-19 NOTE — PROGRESS NOTES
Anticipatory Guidance  care in hospital  Respiratory therapy called for all deliveries  Skin to skin  Immunizations/meds   -Hepatitis B   -Erythromycin   -Vitamin K  Screening   -Hearing   -CCHD   -Bilirubin   -Metabolic  Rooming in  Feeding:  Breast  Car seats  Provider/appointments     KATIUSKA Overton, CNM

## 2017-07-19 NOTE — NURSING NOTE
"Chief Complaint   Patient presents with     Consult     [place] Consult/Pramod Pt        Initial /60 (BP Location: Left arm, Patient Position: Chair, Cuff Size: Adult Regular)  Pulse 85  Ht 5' 7\" (1.702 m)  Wt 156 lb (70.8 kg)  LMP 2017  SpO2 96%  BMI 24.43 kg/m2 Estimated body mass index is 24.43 kg/(m^2) as calculated from the following:    Height as of this encounter: 5' 7\" (1.702 m).    Weight as of this encounter: 156 lb (70.8 kg).  Medication Reconciliation: phuong Win      "

## 2017-07-19 NOTE — MR AVS SNAPSHOT
"              After Visit Summary   7/19/2017    Johana Duff    MRN: [id number]           Patient Information     Date Of Birth          1990        Visit Information        Provider Department      7/19/2017 1:30 PM Bob Riggs APRN [facility]        Care Instructions    Return to office for prenatal care with Dr. Benavidez.          Follow-ups after your visit        Your next 10 appointments already scheduled     Aug 01, 2017  2:10 PM CDT   (Arrive by 1:55 PM)   ESTABLISHED PRENATAL with Vicky Benavidez MD   [facility] ([facility] )    [address]  [facility] [address]   [phone]              Who to contact     If you have questions or need follow up information about today's clinic visit or your schedule please contact [facility] directly at [phone].  Normal or non-critical lab and imaging results will be communicated to you by MyChart, letter or phone within 4 business days after the clinic has received the results. If you do not hear from us within 7 days, please contact the clinic through MyChart or phone. If you have a critical or abnormal lab result, we will notify you by phone as soon as possible.  Submit refill requests through [employer] or call your pharmacy and they will forward the refill request to us. Please allow 3 business days for your refill to be completed.          Additional Information About Your Visit        MyChart Information     [employer] lets you send messages to your doctor, view your test results, renew your prescriptions, schedule appointments and more. To sign up, go to [email or web address] . Click on \"Log in\" on the left side of the screen, which will take you to the Welcome page. Then click on \"Sign up Now\" on the right side of the page.     You will be asked to enter the access code listed below, as well as some personal information. Please follow the directions to create your username and " "password.     Your access code is: [email or web address]  Expires: 10/3/2017  2:05 PM     Your access code will  in 90 days. If you need help or a new code, please call your [place] clinic or [phone].        Care EveryWhere ID     This is your Care EveryWhere ID. This could be used by other organizations to access your [place] medical records  [id number]        Your Vitals Were     Pulse Height Last Period Pulse Oximetry BMI (Body Mass Index)       85 5' 7\" (1.702 m) 2017 96% 24.43 kg/m2        Blood Pressure from Last 3 Encounters:   17 106/60   17 102/62   17 112/58    Weight from Last 3 Encounters:   17 156 lb (70.8 kg)   17 154 lb (69.9 kg)   17 150 lb (68 kg)              Today, you had the following     No orders found for display       Primary Care Provider Office Phone # Fax #    MARIA LUISA Desir [phone]       [facility] [address]  [place] [address]        Equal Access to Services     JOHN GARCIA AH: Hadii aad ku hadasho Soomaali, waaxda luqadaha, qaybta kaalmada adeegyada, suellen looneyin elion brandy bentley lajuliocesar ah. So [facility] [phone].    ATENCIÓN: Si habla español, tiene a thomas disposición servicios gratuitos de asistencia lingüística. Jamie al [phone].    We comply with applicable federal civil rights laws and [place] laws. We do not discriminate on the basis of race, color, national origin, age, disability sex, sexual orientation or gender identity.            Thank you!     [facility]  [facility] your [facility]. Our goal is always to provide you with excellent care. Hearing back from our patients is one way we can continue to improve our services. Please take a few minutes to complete the written survey that you may receive in the mail after your visit with us. Thank you!             Your Updated Medication List - Protect others around you: Learn how to safely use, store and throw " away your medicines at www.disposemymeds.org.          This list is accurate as of: 7/19/17  2:33 PM.  Always use your most recent med list.                   Brand Name Dispense Instructions for use Diagnosis    PRENATAL 1 PO       Annual physical exam, Family planning

## 2017-08-01 ENCOUNTER — PRENATAL OFFICE VISIT (OUTPATIENT)
Dept: OBGYN | Facility: OTHER | Age: 27
End: 2017-08-01
Attending: OBSTETRICS & GYNECOLOGY
Payer: COMMERCIAL

## 2017-08-01 VITALS — BODY MASS INDEX: 24.9 KG/M2 | WEIGHT: 159 LBS | SYSTOLIC BLOOD PRESSURE: 102 MMHG | DIASTOLIC BLOOD PRESSURE: 52 MMHG

## 2017-08-01 DIAGNOSIS — Z34.01 ENCOUNTER FOR SUPERVISION OF NORMAL FIRST PREGNANCY IN FIRST TRIMESTER: Primary | ICD-10-CM

## 2017-08-01 PROCEDURE — 99207 ZZC PRENATAL VISIT: CPT | Performed by: OBSTETRICS & GYNECOLOGY

## 2017-08-01 NOTE — PATIENT INSTRUCTIONS
Return to clinic in 2 weeks  Lab first next visit.  If you think your bag of water is broken; have bleeding like a period; think you are in labor; or are worried about your baby's movement, please call the labor and delivery unit at [phone].

## 2017-08-01 NOTE — MR AVS SNAPSHOT
After Visit Summary   8/1/2017    Johana Duff    MRN: [id number]           Patient Information     Date Of Birth          1990        Visit Information        Provider Department      8/1/2017 2:10 PM Vicky Benavidez MD [facility]        Today's Diagnoses     Encounter for supervision of normal first pregnancy in first trimester    -  1      Care Instructions    Return to clinic in 2 weeks  Lab first next visit.  If you think your bag of water is broken; have bleeding like a period; think you are in labor; or are worried about your baby's movement, please call the labor and delivery unit at [phone].            Follow-ups after your visit        Your next 10 appointments already scheduled     Aug 15, 2017  8:30 AM CDT   LAB with HC LAB   [facility] ([facility] )    [address] [place] [address]   [phone]            Aug 15, 2017  8:50 AM CDT   (Arrive by 8:35 AM)   ESTABLISHED PRENATAL with Vicky Benavidez MD   [facility] ([facility] )    [address] [place] [address]   [phone]              Future tests that were ordered for you today     Open Future Orders        Priority Expected Expires Ordered    Glucose tolerance gest screen 1 hour Routine 8/14/2017 9/1/2017 8/1/2017    CBC with platelets Routine 8/14/2017 9/1/2017 8/1/2017    UA with Microscopic reflex to Culture Routine 8/14/2017 9/1/2017 8/1/2017            Who to contact     If you have questions or need follow up information about today's clinic visit or your schedule please contact [facility] at [phone].  Normal or non-critical lab and imaging results will be communicated to you by MyChart, letter or phone within 4 business days after the clinic has received the results. If you do not hear from us within 7 days, please contact the clinic through MyChart or phone. If you have a critical or  "abnormal lab result, we will notify you by phone as soon as possible.  Submit refill requests through SHARKMARX or call your pharmacy and they will forward the refill request to us. Please allow 3 business days for your refill to be completed.          Additional Information About Your Visit        HipSwaphart Information     SHARKMARX lets you send messages to your doctor, view your test results, renew your prescriptions, schedule appointments and more. To sign up, go to [email or web address] . Click on \"Log in\" on the left side of the screen, which will take you to the Welcome page. Then click on \"Sign up Now\" on the right side of the page.     You will be asked to enter the access code listed below, as well as some personal information. Please follow the directions to create your username and password.     Your access code is: [email or web address]  Expires: 10/3/2017  2:05 PM     Your access code will  in 90 days. If you need help or a new code, please call your [place] clinic or [phone].        Care EveryWhere ID     This is your Care EveryWhere ID. This could be used by other organizations to access your [place] medical records  [id number]        Your Vitals Were     Last Period BMI (Body Mass Index)                2017 24.9 kg/m2           Blood Pressure from Last 3 Encounters:   17 102/52   17 106/60   17 102/62    Weight from Last 3 Encounters:   17 159 lb (72.1 kg)   17 156 lb (70.8 kg)   17 154 lb (69.9 kg)               Primary Care Provider Office Phone # Fax #    MARIA LUISA Desir [phone]       [facility] [place] [address] [address]        Equal Access to Services     [facility] JOSE : Reyna Burks, wadevonteda luqadaha, qaybta kaalmada luz maria, suellen saleh. So [facility] [phone].    ATENCIÓN: Si habla español, tiene a thomas disposición servicios gratuitos de asistencia " lingüística. Jamie al [phone].    We comply with applicable federal civil rights laws and [place] laws. We do not discriminate on the basis of race, color, national origin, age, disability sex, sexual orientation or gender identity.            Thank you!     [facility]  [facility] your [facility]. Our goal is always to provide you with excellent care. Hearing back from our patients is one way we can continue to improve our services. Please take a few minutes to complete the written survey that you may receive in the mail after your visit with us. Thank you!             Your Updated Medication List - Protect others around you: Learn how to safely use, store and throw away your medicines at www.disposemymeds.org.          This list is accurate as of: 8/1/17  3:26 PM.  Always use your most recent med list.                   Brand Name Dispense Instructions for use Diagnosis    PRENATAL 1 PO       Annual physical exam, Family planning

## 2017-08-15 ENCOUNTER — PRENATAL OFFICE VISIT (OUTPATIENT)
Dept: OBGYN | Facility: OTHER | Age: 27
End: 2017-08-15
Attending: OBSTETRICS & GYNECOLOGY
Payer: COMMERCIAL

## 2017-08-15 VITALS — WEIGHT: 162 LBS | DIASTOLIC BLOOD PRESSURE: 62 MMHG | BODY MASS INDEX: 25.37 KG/M2 | SYSTOLIC BLOOD PRESSURE: 100 MMHG

## 2017-08-15 DIAGNOSIS — Z23 NEED FOR TDAP VACCINATION: ICD-10-CM

## 2017-08-15 DIAGNOSIS — Z34.01 ENCOUNTER FOR SUPERVISION OF NORMAL FIRST PREGNANCY IN FIRST TRIMESTER: ICD-10-CM

## 2017-08-15 DIAGNOSIS — Z23 NEED FOR VACCINATION: ICD-10-CM

## 2017-08-15 DIAGNOSIS — Z34.01 ENCOUNTER FOR SUPERVISION OF NORMAL FIRST PREGNANCY IN FIRST TRIMESTER: Primary | ICD-10-CM

## 2017-08-15 LAB
ALBUMIN UR-MCNC: NEGATIVE MG/DL
APPEARANCE UR: CLEAR
BACTERIA #/AREA URNS HPF: ABNORMAL /HPF
BILIRUB UR QL STRIP: NEGATIVE
COLOR UR AUTO: ABNORMAL
ERYTHROCYTE [DISTWIDTH] IN BLOOD BY AUTOMATED COUNT: 12 % (ref 10–15)
GLUCOSE 1H P 50 G GLC PO SERPL-MCNC: 98 MG/DL (ref 60–129)
GLUCOSE UR STRIP-MCNC: NEGATIVE MG/DL
HCT VFR BLD AUTO: 33 % (ref 35–47)
HGB BLD-MCNC: 11.6 G/DL (ref 11.7–15.7)
HGB UR QL STRIP: NEGATIVE
KETONES UR STRIP-MCNC: NEGATIVE MG/DL
LEUKOCYTE ESTERASE UR QL STRIP: NEGATIVE
MCH RBC QN AUTO: 32 PG (ref 26.5–33)
MCHC RBC AUTO-ENTMCNC: 35.2 G/DL (ref 31.5–36.5)
MCV RBC AUTO: 91 FL (ref 78–100)
NITRATE UR QL: NEGATIVE
PH UR STRIP: 6.5 PH (ref 4.7–8)
PLATELET # BLD AUTO: 187 10E9/L (ref 150–450)
RBC # BLD AUTO: 3.62 10E12/L (ref 3.8–5.2)
RBC #/AREA URNS AUTO: 0 /HPF (ref 0–2)
SP GR UR STRIP: 1 (ref 1–1.03)
URN SPEC COLLECT METH UR: ABNORMAL
UROBILINOGEN UR STRIP-MCNC: NORMAL MG/DL (ref 0–2)
WBC # BLD AUTO: 10 10E9/L (ref 4–11)
WBC #/AREA URNS AUTO: 1 /HPF (ref 0–2)

## 2017-08-15 PROCEDURE — 90715 TDAP VACCINE 7 YRS/> IM: CPT | Performed by: OBSTETRICS & GYNECOLOGY

## 2017-08-15 PROCEDURE — 90471 IMMUNIZATION ADMIN: CPT | Performed by: OBSTETRICS & GYNECOLOGY

## 2017-08-15 PROCEDURE — 81001 URINALYSIS AUTO W/SCOPE: CPT | Performed by: OBSTETRICS & GYNECOLOGY

## 2017-08-15 PROCEDURE — 36415 COLL VENOUS BLD VENIPUNCTURE: CPT | Performed by: OBSTETRICS & GYNECOLOGY

## 2017-08-15 PROCEDURE — 85027 COMPLETE CBC AUTOMATED: CPT | Performed by: OBSTETRICS & GYNECOLOGY

## 2017-08-15 PROCEDURE — 82950 GLUCOSE TEST: CPT | Performed by: OBSTETRICS & GYNECOLOGY

## 2017-08-15 PROCEDURE — 99207 ZZC PRENATAL VISIT: CPT | Mod: 25 | Performed by: OBSTETRICS & GYNECOLOGY

## 2017-08-15 NOTE — PROGRESS NOTES
28 Week Visit    Patient education provided on the following:  Effective positioning and latch techniques  Importance of early initiation of breastfeeding  Importance of rooming-in on a 24-hour basis    We reviewed the [place] [employer] Prenatal Toolkit in the [facility] Resource Book.  Patient questions and concerns addressed and reviewed. Support and encouragement provided.    [facility] Website verified for patient immunization history.  Tdap done.      HERNANDEZ TANG      rto 2 wk  tdap done

## 2017-08-15 NOTE — MR AVS SNAPSHOT
"              After Visit Summary   8/15/2017    Johana Duff    MRN: [id number]           Patient Information     Date Of Birth          1990        Visit Information        Provider Department      8/15/2017 8:50 AM Vicky Benavidez MD [facility]        Today's Diagnoses     Encounter for supervision of normal first pregnancy in first trimester    -  1    Need for vaccination        Need for Tdap vaccination          Care Instructions    Lab today.  Tdap vaccine today.  Return to clinic in 2 weeks.  If you think your bag of water is broken; have bleeding like a period; think you are in labor; or are worried about your baby's movement, please call the labor and delivery unit at [phone].            Follow-ups after your visit        Who to contact     If you have questions or need follow up information about today's clinic visit or your schedule please contact [facility] directly at [phone].  Normal or non-critical lab and imaging results will be communicated to you by MyChart, letter or phone within 4 business days after the clinic has received the results. If you do not hear from us within 7 days, please contact the clinic through MyChart or phone. If you have a critical or abnormal lab result, we will notify you by phone as soon as possible.  Submit refill requests through [employer] or call your pharmacy and they will forward the refill request to us. Please allow 3 business days for your refill to be completed.          Additional Information About Your Visit        MyChart Information     [employer] lets you send messages to your doctor, view your test results, renew your prescriptions, schedule appointments and more. To sign up, go to [email or web address] . Click on \"Log in\" on the left side of the screen, which will take you to the Welcome page. Then click on \"Sign up Now\" on the right side of the page.     You will be asked to enter the access code listed below, as " well as some personal information. Please follow the directions to create your username and password.     Your access code is: [email or web address]  Expires: 10/3/2017  2:05 PM     Your access code will  in 90 days. If you need help or a new code, please call your [place] clinic or [phone].        Care EveryWhere ID     This is your Care EveryWhere ID. This could be used by other organizations to access your [place] medical records  [id number]        Your Vitals Were     Last Period BMI (Body Mass Index)                2017 25.37 kg/m2           Blood Pressure from Last 3 Encounters:   08/15/17 100/62   17 102/52   17 106/60    Weight from Last 3 Encounters:   08/15/17 162 lb (73.5 kg)   17 159 lb (72.1 kg)   17 156 lb (70.8 kg)              We Performed the Following     1st  Administration  [19352]     TDAP VACCINE (ADACEL) [20112.002]        Primary Care Provider Office Phone # Fax #    MARIA LUISA Desir [phone]       [facility] [address] [facility] [address]  [place] [address]        Equal Access to Services     ASAEL GARCIA AH: Hadii aad ku hadasho Soomaali, waaxda luqadaha, qaybta kaalmada adeegyada, waxay idiin haymarilynn brandy finch . So [facility] [phone].    ATENCIÓN: Si habla español, tiene a thomas disposición servicios gratuitos de asistencia lingüística. Llame al [phone].    We comply with applicable federal civil rights laws and [place] laws. We do not discriminate on the basis of race, color, national origin, age, disability sex, sexual orientation or gender identity.            Thank you!     [facility]  [facility] your [facility]. Our goal is always to provide you with excellent care. Hearing back from our patients is one way we can continue to improve our services. Please take a few minutes to complete the written survey that you may receive in the mail after your visit with us. Thank you!             Your  Updated Medication List - Protect others around you: Learn how to safely use, store and throw away your medicines at www.disposemymeds.org.          This list is accurate as of: 8/15/17  9:27 AM.  Always use your most recent med list.                   Brand Name Dispense Instructions for use Diagnosis    PRENATAL 1 PO       Annual physical exam, Family planning

## 2017-08-15 NOTE — PATIENT INSTRUCTIONS
Lab today.  Tdap vaccine today.  Return to clinic in 2 weeks.  If you think your bag of water is broken; have bleeding like a period; think you are in labor; or are worried about your baby's movement, please call the labor and delivery unit at [phone].

## 2017-08-29 ENCOUNTER — PRENATAL OFFICE VISIT (OUTPATIENT)
Dept: OBGYN | Facility: OTHER | Age: 27
End: 2017-08-29
Attending: OBSTETRICS & GYNECOLOGY
Payer: COMMERCIAL

## 2017-08-29 VITALS — SYSTOLIC BLOOD PRESSURE: 116 MMHG | WEIGHT: 167 LBS | BODY MASS INDEX: 26.16 KG/M2 | DIASTOLIC BLOOD PRESSURE: 60 MMHG

## 2017-08-29 DIAGNOSIS — Z34.01 ENCOUNTER FOR SUPERVISION OF NORMAL FIRST PREGNANCY IN FIRST TRIMESTER: Primary | ICD-10-CM

## 2017-08-29 PROCEDURE — 99207 ZZC PRENATAL VISIT: CPT | Performed by: OBSTETRICS & GYNECOLOGY

## 2017-08-29 NOTE — MR AVS SNAPSHOT
"              After Visit Summary   8/29/2017    Johana Duff    MRN: [id number]           Patient Information     Date Of Birth          1990        Visit Information        Provider Department      8/29/2017 9:40 AM Vicky Benavidez MD [facility] [place]        Today's Diagnoses     Encounter for supervision of normal first pregnancy in first trimester    -  1       Follow-ups after your visit        Your next 10 appointments already scheduled     Sep 12, 2017 10:20 AM CDT   (Arrive by 10:05 AM)   ESTABLISHED PRENATAL with Vicky Benavidez MD   [facility] ([facility]    [address] [place] [address]   [phone]              Who to contact     If you have questions or need follow up information about today's clinic visit or your schedule please contact [facility] directly at [phone].  Normal or non-critical lab and imaging results will be communicated to you by MyChart, letter or phone within 4 business days after the clinic has received the results. If you do not hear from us within 7 days, please contact the clinic through MyChart or phone. If you have a critical or abnormal lab result, we will notify you by phone as soon as possible.  Submit refill requests through [employer] or call your pharmacy and they will forward the refill request to us. Please allow 3 business days for your refill to be completed.          Additional Information About Your Visit        MyChart Information     [employer] lets you send messages to your doctor, view your test results, renew your prescriptions, schedule appointments and more. To sign up, go to [email or web address] . Click on \"Log in\" on the left side of the screen, which will take you to the Welcome page. Then click on \"Sign up Now\" on the right side of the page.     You will be asked to enter the access code listed below, as well as some personal information. Please follow the directions to " create your username and password.     Your access code is: [email or web address]  Expires: 10/3/2017  2:05 PM     Your access code will  in 90 days. If you need help or a new code, please call your [place] clinic or [phone].        Care EveryWhere ID     This is your Care EveryWhere ID. This could be used by other organizations to access your [place] medical records  [id number]        Your Vitals Were     Last Period BMI (Body Mass Index)                2017 26.16 kg/m2           Blood Pressure from Last 3 Encounters:   17 116/60   08/15/17 100/62   17 102/52    Weight from Last 3 Encounters:   17 167 lb (75.8 kg)   08/15/17 162 lb (73.5 kg)   17 159 lb (72.1 kg)              Today, you had the following     No orders found for display       Primary Care Provider Office Phone # Fax #    MARIA LUISA Desir [phone]       [facility] [address] [place] [address]        Equal Access to Services     [facility]: Hadii aad ku hadasho Soomaali, waaxda luqadaha, qaybta kaalmada adeegyada, waxay idiin hayaan adeeg khararossy lafernandon . So [facility] [phone].    ATENCIÓN: Si habla español, tiene a thomas disposición servicios gratuitos de asistencia lingüística. Llame al [phone].    We comply with applicable federal civil rights laws and [place] laws. We do not discriminate on the basis of race, color, national origin, age, disability sex, sexual orientation or gender identity.            Thank you!     Thank you for choosing [facility]  for your care. Our goal is always to provide you with excellent care. Hearing back from our patients is one way we can continue to improve our services. Please take a few minutes to complete the written survey that you may receive in the mail after your visit with us. Thank you!             Your Updated Medication List - Protect others around you: Learn how to safely use, store and throw away your  medicines at www.disposemymeds.org.          This list is accurate as of: 8/29/17 11:59 PM.  Always use your most recent med list.                   Brand Name Dispense Instructions for use Diagnosis    PRENATAL 1 PO       Annual physical exam, Family planning

## 2017-09-13 ENCOUNTER — PRENATAL OFFICE VISIT (OUTPATIENT)
Dept: OBGYN | Facility: OTHER | Age: 27
End: 2017-09-13
Attending: OBSTETRICS & GYNECOLOGY
Payer: COMMERCIAL

## 2017-09-13 VITALS — DIASTOLIC BLOOD PRESSURE: 54 MMHG | SYSTOLIC BLOOD PRESSURE: 104 MMHG | BODY MASS INDEX: 26.31 KG/M2 | WEIGHT: 168 LBS

## 2017-09-13 DIAGNOSIS — Z34.01 ENCOUNTER FOR SUPERVISION OF NORMAL FIRST PREGNANCY IN FIRST TRIMESTER: Primary | ICD-10-CM

## 2017-09-13 DIAGNOSIS — Z23 NEED FOR PROPHYLACTIC VACCINATION AND INOCULATION AGAINST INFLUENZA: ICD-10-CM

## 2017-09-13 DIAGNOSIS — Z23 NEED FOR INFLUENZA VACCINATION: ICD-10-CM

## 2017-09-13 PROCEDURE — 90686 IIV4 VACC NO PRSV 0.5 ML IM: CPT | Performed by: OBSTETRICS & GYNECOLOGY

## 2017-09-13 PROCEDURE — 90471 IMMUNIZATION ADMIN: CPT | Performed by: OBSTETRICS & GYNECOLOGY

## 2017-09-13 PROCEDURE — 99207 ZZC PRENATAL VISIT: CPT | Performed by: OBSTETRICS & GYNECOLOGY

## 2017-09-13 NOTE — MR AVS SNAPSHOT
After Visit Summary   9/13/2017    Johana Duff    MRN: [id number]           Patient Information     Date Of Birth          1990        Visit Information        Provider Department      9/13/2017 3:20 PM Vicky Benavidez MD [place] [facility] [place]        Today's Diagnoses     Encounter for supervision of normal first pregnancy in first trimester    -  1    Need for prophylactic vaccination and inoculation against influenza        Need for influenza vaccination          Care Instructions    Flu shot done.  Return to clinic in 2 weeks  If you think your bag of water is broken; have bleeding like a period; think you are in labor; or are worried about your baby's movement, please call the labor and delivery unit at [phone].            Follow-ups after your visit        Your next 10 appointments already scheduled     Sep 26, 2017  8:40 AM CDT   (Arrive by 8:25 AM)   ESTABLISHED PRENATAL with Vicky Benavidez MD   [facility] [place] ([facility] [place] )    [address]  [place] [address]   [phone]              Who to contact     If you have questions or need follow up information about today's clinic visit or your schedule please contact [facility] directly at [phone].  Normal or non-critical lab and imaging results will be communicated to you by MyChart, letter or phone within 4 business days after the clinic has received the results. If you do not hear from us within 7 days, please contact the clinic through MyChart or phone. If you have a critical or abnormal lab result, we will notify you by phone as soon as possible.  Submit refill requests through [employer] or call your pharmacy and they will forward the refill request to us. Please allow 3 business days for your refill to be completed.          Additional Information About Your Visit        MyChart Information     [employer] lets you send messages to your doctor, view your test results, renew  "your prescriptions, schedule appointments and more. To sign up, go to [email or web address] . Click on \"Log in\" on the left side of the screen, which will take you to the Welcome page. Then click on \"Sign up Now\" on the right side of the page.     You will be asked to enter the access code listed below, as well as some personal information. Please follow the directions to create your username and password.     Your access code is: [email or web address]  Expires: 10/3/2017  2:05 PM     Your access code will  in 90 days. If you need help or a new code, please call your [place] clinic or [phone].        Care EveryWhere ID     This is your Care EveryWhere ID. This could be used by other organizations to access your [place] medical records  [id number]        Your Vitals Were     Last Period BMI (Body Mass Index)                2017 26.31 kg/m2           Blood Pressure from Last 3 Encounters:   17 104/54   17 116/60   08/15/17 100/62    Weight from Last 3 Encounters:   17 168 lb (76.2 kg)   17 167 lb (75.8 kg)   08/15/17 162 lb (73.5 kg)              We Performed the Following     FLU VAC, SPLIT VIRUS IM > 3 YO (QUADRIVALENT) [42986]     Vaccine Administration, Initial [60983]        Primary Care Provider Office Phone # Fax #    MARIA LUISA Desir [phone]       [facility] [address]  [place] [address]        Equal Access to Services     [facility]: Hadii aad ku hadasho Soomaali, waaxda luqadaha, qaybta kaalmada adeegyadahlia, suellen saleh. So [facility] [phone].    ATENCIÓN: Si habla español, tiene a thomas disposición servicios gratuitos de asistencia lingüística. Llame al [phone].    We comply with applicable federal civil rights laws and [place] laws. We do not discriminate on the basis of race, color, national origin, age, disability sex, sexual orientation or gender identity.            Thank you!     " Thank you [facility]  for your [facility]. Our goal is always to provide you with excellent care. Hearing back from our patients is one way we can continue to improve our services. Please take a few minutes to complete the written survey that you may receive in the mail after your visit with us. Thank you!             Your Updated Medication List - Protect others around you: Learn how to safely use, store and throw away your medicines at www.disposemymeds.org.          This list is accurate as of: 9/13/17 11:59 PM.  Always use your most recent med list.                   Brand Name Dispense Instructions for use Diagnosis    PRENATAL 1 PO       Annual physical exam, Family planning

## 2017-09-13 NOTE — PROGRESS NOTES
Injectable Influenza Immunization Documentation    1.  Are you sick today? (Fever of 100.5 or higher on the day of the clinic)   No    2.  Have you ever had Guillain-[place]ville Syndrome within 6 weeks of an influenza vaccionation?  No    3. Do you have a life-threatening allergy to eggs?  No    4. Do you have a life-threatening allergy to a component of the vaccine? May include antibiotics, gelatin or latex.  No     5. Have you ever had a reaction to a dose of flu vaccine that needed immediate medical attention?  No     Form completed by LEE PIERRE

## 2017-09-14 NOTE — PATIENT INSTRUCTIONS
Flu shot done.  Return to clinic in 2 weeks  If you think your bag of water is broken; have bleeding like a period; think you are in labor; or are worried about your baby's movement, please call the labor and delivery unit at [phone].

## 2017-09-26 ENCOUNTER — PRENATAL OFFICE VISIT (OUTPATIENT)
Dept: OBGYN | Facility: OTHER | Age: 27
End: 2017-09-26
Attending: OBSTETRICS & GYNECOLOGY
Payer: COMMERCIAL

## 2017-09-26 VITALS — WEIGHT: 174 LBS | BODY MASS INDEX: 27.25 KG/M2 | SYSTOLIC BLOOD PRESSURE: 104 MMHG | DIASTOLIC BLOOD PRESSURE: 62 MMHG

## 2017-09-26 DIAGNOSIS — Z34.01 ENCOUNTER FOR SUPERVISION OF NORMAL FIRST PREGNANCY IN FIRST TRIMESTER: Primary | ICD-10-CM

## 2017-09-26 PROCEDURE — 99207 ZZC PRENATAL VISIT: CPT | Performed by: OBSTETRICS & GYNECOLOGY

## 2017-09-26 NOTE — MR AVS SNAPSHOT
"              After Visit Summary   9/26/2017    Johana Duff    MRN: [id number]           Patient Information     Date Of Birth          1990        Visit Information        Provider Department      9/26/2017 8:40 AM Vicky Benavidez MD [place] [address]        Today's Diagnoses     Encounter for supervision of normal first pregnancy in first trimester    -  1      Care Instructions    Return to clinic in 2 weeks  If you think your bag of water is broken; have bleeding like a period; think you are in labor; or are worried about your baby's movement, please call the labor and delivery unit at [phone].            Follow-ups after your visit        Your next 10 appointments already scheduled     Oct 10, 2017  8:40 AM CDT   (Arrive by 8:25 AM)   ESTABLISHED PRENATAL with Vicky Benavidez MD   [facility] [place] ([facility] [place] )    [address]  [place] [address] [phone]              Who to contact     If you have questions or need follow up information about today's clinic visit or your schedule please contact [facility] directly at [phone].  Normal or non-critical lab and imaging results will be communicated to you by [employer], letter or phone within 4 business days after the clinic has received the results. If you do not hear from us within 7 days, please contact the clinic through [employer] or phone. If you have a critical or abnormal lab result, we will notify you by phone as soon as possible.  Submit refill requests through [facility] or call your pharmacy and they will forward the refill request to us. Please allow 3 business days for your refill to be completed.          Additional Information About Your Visit        MyChart Information     [facility] lets you send messages to your doctor, view your test results, renew your prescriptions, schedule appointments and more. To sign up, go to [email or web address] . Click on \"Log in\" on the left side " "of the screen, which will take you to the Welcome page. Then click on \"Sign up Now\" on the right side of the page.     You will be asked to enter the access code listed below, as well as some personal information. Please follow the directions to create your username and password.     Your access code is: [email or web address]  Expires: 10/3/2017  2:05 PM     Your access code will  in 90 days. If you need help or a new code, please call your [place] clinic or [phone].        Care EveryWhere ID     This is your Care EveryWhere ID. This could be used by other organizations to access your [place] medical records  [id number]        Your Vitals Were     Last Period BMI (Body Mass Index)                2017 27.25 kg/m2           Blood Pressure from Last 3 Encounters:   17 102/64   17 104/62   17 104/54    Weight from Last 3 Encounters:   17 176 lb 6 oz (80 kg)   17 174 lb (78.9 kg)   17 168 lb (76.2 kg)              Today, you had the following     No orders found for display       Primary Care Provider Office Phone # Fax #    MARIA LUISA Desir [phone]       [facility] [address] [address]        Equal Access to Services     ASAEL GARCIA : Hadii aad ku hadasho Soomaali, waaxda luqadaha, qaybta kaalmada adeegyada, suellen finch . So [facility] [phone].    ATENCIÓN: Si habla español, tiene a thomas disposición servicios gratuitos de asistencia lingüística. Jamie al [phone].    We comply with applicable federal civil rights laws and [place] laws. We do not discriminate on the basis of race, color, national origin, age, disability sex, sexual orientation or gender identity.            Thank you!     Thank you for choosing [facility]  for your care. Our goal is always to provide you with excellent care. Hearing back from our patients is one way we can continue to improve our services. " Please take a few minutes to complete the written survey that you may receive in the mail after your visit with us. Thank you!             Your Updated Medication List - Protect others around you: Learn how to safely use, store and throw away your medicines at www.disposemymeds.org.          This list is accurate as of: 9/26/17 11:59 PM.  Always use your most recent med list.                   Brand Name Dispense Instructions for use Diagnosis    PRENATAL 1 PO       Annual physical exam, Family planning

## 2017-09-28 ENCOUNTER — OFFICE VISIT (OUTPATIENT)
Dept: FAMILY MEDICINE | Facility: OTHER | Age: 27
End: 2017-09-28
Attending: FAMILY MEDICINE
Payer: COMMERCIAL

## 2017-09-28 VITALS
DIASTOLIC BLOOD PRESSURE: 64 MMHG | SYSTOLIC BLOOD PRESSURE: 102 MMHG | HEART RATE: 106 BPM | HEIGHT: 66 IN | BODY MASS INDEX: 28.34 KG/M2 | TEMPERATURE: 98.5 F | OXYGEN SATURATION: 99 % | WEIGHT: 176.38 LBS

## 2017-09-28 DIAGNOSIS — Z76.89 ESTABLISHING CARE WITH NEW DOCTOR, ENCOUNTER FOR: Primary | ICD-10-CM

## 2017-09-28 DIAGNOSIS — M54.2 CERVICALGIA: ICD-10-CM

## 2017-09-28 DIAGNOSIS — M72.2 PLANTAR FASCIITIS: ICD-10-CM

## 2017-09-28 PROBLEM — Z23 NEED FOR INFLUENZA VACCINATION: Status: RESOLVED | Noted: 2017-08-15 | Resolved: 2017-09-28

## 2017-09-28 PROBLEM — Z23 NEED FOR TDAP VACCINATION: Status: RESOLVED | Noted: 2017-08-15 | Resolved: 2017-09-28

## 2017-09-28 PROCEDURE — 99214 OFFICE O/P EST MOD 30 MIN: CPT | Performed by: FAMILY MEDICINE

## 2017-09-28 ASSESSMENT — ANXIETY QUESTIONNAIRES
2. NOT BEING ABLE TO STOP OR CONTROL WORRYING: NOT AT ALL
GAD7 TOTAL SCORE: 0
5. BEING SO RESTLESS THAT IT IS HARD TO SIT STILL: NOT AT ALL
1. FEELING NERVOUS, ANXIOUS, OR ON EDGE: NOT AT ALL
6. BECOMING EASILY ANNOYED OR IRRITABLE: NOT AT ALL
3. WORRYING TOO MUCH ABOUT DIFFERENT THINGS: NOT AT ALL
IF YOU CHECKED OFF ANY PROBLEMS ON THIS QUESTIONNAIRE, HOW DIFFICULT HAVE THESE PROBLEMS MADE IT FOR YOU TO DO YOUR WORK, TAKE CARE OF THINGS AT HOME, OR GET ALONG WITH OTHER PEOPLE: NOT DIFFICULT AT ALL
4. TROUBLE RELAXING: NOT AT ALL
7. FEELING AFRAID AS IF SOMETHING AWFUL MIGHT HAPPEN: NOT AT ALL

## 2017-09-28 ASSESSMENT — PATIENT HEALTH QUESTIONNAIRE - PHQ9: SUM OF ALL RESPONSES TO PHQ QUESTIONS 1-9: 4

## 2017-09-28 ASSESSMENT — PAIN SCALES - GENERAL: PAINLEVEL: NO PAIN (0)

## 2017-09-28 NOTE — PROGRESS NOTES
"  SUBJECTIVE:                                                    Johana Duff is a 26 year old female who presents to clinic today for the following health issues:    New Patient/Establish  Musculoskeletal problem/pain      Duration: one month    Description  Location: neck    Intensity:  mild    Accompanying signs and symptoms: radiation of pain to back    History  Previous similar problem: no   Previous evaluation:  chiropractor    Precipitating or alleviating factors:  Trauma or overuse: no   Aggravating factors include: none    Therapies tried and outcome: nothing    Having baby in November and would like me to be baby doctor    Problem list and histories reviewed & adjusted, as indicated.  Additional history: as documented    Current Outpatient Prescriptions   Medication Sig Dispense Refill     Prenatal MV-Min-Fe Fum-FA-DHA (PRENATAL 1 PO)        Labs reviewed in EPIC    ROS:  Constitutional, HEENT, cardiovascular, pulmonary, gi and gu systems are negative, except as otherwise noted.      OBJECTIVE:                                                    /64 (BP Location: Left arm, Patient Position: Chair, Cuff Size: Adult Regular)  Pulse 106  Temp 98.5  F (36.9  C) (Tympanic)  Ht 5' 5.95\" (1.675 m)  Wt 176 lb 6 oz (80 kg)  LMP 02/02/2017  SpO2 99%  BMI 28.52 kg/m2  Body mass index is 28.52 kg/(m^2).  GENERAL APPEARANCE: healthy, alert and no distress  NECK: no adenopathy, no asymmetry, masses, or scars and thyroid normal to palpation  RESP: lungs clear to auscultation - no rales, rhonchi or wheezes  CV: regular rates and rhythm, normal S1 S2, no S3 or S4 and no murmur, click or rub  PSYCH: mentation appears normal and affect normal/bright       ASSESSMENT/PLAN:                                                    1. Establishing care with new doctor, encounter for      2. Cervicalgia  Ok to continue with chiropractor    3. Plantar fasciitis  ice    Patient was agreeable to this plan and had no " further questions.  See Patient Instructions    Debby Floyd MD  [facility]

## 2017-09-28 NOTE — PATIENT INSTRUCTIONS
Return to clinic in 2 weeks  If you think your bag of water is broken; have bleeding like a period; think you are in labor; or are worried about your baby's movement, please call the labor and delivery unit at [phone].

## 2017-09-28 NOTE — NURSING NOTE
"Chief Complaint   Patient presents with     Prenatal Care     establish with provider       Initial /64 (BP Location: Left arm, Patient Position: Chair, Cuff Size: Adult Regular)  Pulse 106  Temp 98.5  F (36.9  C) (Tympanic)  Ht 5' 5.95\" (1.675 m)  Wt 176 lb 6 oz (80 kg)  LMP 02/02/2017  SpO2 99%  BMI 28.52 kg/m2 Estimated body mass index is 28.52 kg/(m^2) as calculated from the following:    Height as of this encounter: 5' 5.95\" (1.675 m).    Weight as of this encounter: 176 lb 6 oz (80 kg).  Medication Reconciliation: complete     Scarlet Naqvi    "

## 2017-09-28 NOTE — MR AVS SNAPSHOT
"              After Visit Summary   9/28/2017    Johana Duff    MRN: [id number]           Patient Information     Date Of Birth          1990        Visit Information        Provider Department      9/28/2017 10:00 AM Debby Floyd MD [facility] [place]        Today's Diagnoses     Establishing care with new doctor, encounter for    -  1    Cervicalgia        Plantar fasciitis           Follow-ups after your visit        Your next 10 appointments already scheduled     Oct 10, 2017  8:40 AM CDT   (Arrive by 8:25 AM)   ESTABLISHED PRENATAL with Vicky Benavidez MD   [facility] [place] ([facility] [place] )    [address]  [place] [address]   [phone]              Who to contact     If you have questions or need follow up information about today's clinic visit or your schedule please contact [facility] directly at [phone].  Normal or non-critical lab and imaging results will be communicated to you by MyChart, letter or phone within 4 business days after the clinic has received the results. If you do not hear from us within 7 days, please contact the clinic through MyChart or phone. If you have a critical or abnormal lab result, we will notify you by phone as soon as possible.  Submit refill requests through [employer] or call your pharmacy and they will forward the refill request to us. Please allow 3 business days for your refill to be completed.          Additional Information About Your Visit        MyChart Information     [employer] lets you send messages to your doctor, view your test results, renew your prescriptions, schedule appointments and more. To sign up, go to [email or web address] . Click on \"Log in\" on the left side of the screen, which will take you to the Welcome page. Then click on \"Sign up Now\" on the right side of the page.     You will be asked to enter the access code listed below, as well as some personal information. Please " "follow the directions to create your username and password.     Your access code is: [email or web address]  Expires: 10/3/2017  2:05 PM     Your access code will  in 90 days. If you need help or a new code, please call your [place] clinic or [phone].        Care EveryWhere ID     This is your Care EveryWhere ID. This could be used by other organizations to access your [place] medical records  [id number]        Your Vitals Were     Pulse Temperature Height Last Period Pulse Oximetry BMI (Body Mass Index)    106 98.5  F (36.9  C) (Tympanic) 5' 5.95\" (1.675 m) 2017 99% 28.52 kg/m2       Blood Pressure from Last 3 Encounters:   17 102/64   17 104/62   17 104/54    Weight from Last 3 Encounters:   17 176 lb 6 oz (80 kg)   17 174 lb (78.9 kg)   17 168 lb (76.2 kg)              Today, you had the following     No orders found for display       Primary Care Provider Office Phone # Fax #    MARIA LUISA Desir [phone]       [facility] [address] [address]        Equal Access to Services     [facility]: Hadii aad ku hadasho Soomaali, waaxda luqadaha, qaybta kaalmada adeegyada, waxay idiin hayaan brandy finch . So [facility] [phone].    ATENCIÓN: Si habla español, tiene a thomas disposición servicios gratuitos de asistencia lingüística. Llame al [phone].    We comply with applicable federal civil rights laws and [place] laws. We do not discriminate on the basis of race, color, national origin, age, disability sex, sexual orientation or gender identity.            Thank you!     Thank you for choosing [facility]  for your care. Our goal is always to provide you with excellent care. Hearing back from our patients is one way we can continue to improve our services. Please take a few minutes to complete the written survey that you may receive in the mail after your visit with us. Thank you!           "   Your Updated Medication List - Protect others around you: Learn how to safely use, store and throw away your medicines at www.disposemymeds.org.          This list is accurate as of: 9/28/17 12:16 PM.  Always use your most recent med list.                   Brand Name Dispense Instructions for use Diagnosis    PRENATAL 1 PO       Annual physical exam, Family planning

## 2017-09-29 ASSESSMENT — ANXIETY QUESTIONNAIRES: GAD7 TOTAL SCORE: 0

## 2017-10-10 ENCOUNTER — PRENATAL OFFICE VISIT (OUTPATIENT)
Dept: OBGYN | Facility: OTHER | Age: 27
End: 2017-10-10
Attending: OBSTETRICS & GYNECOLOGY
Payer: COMMERCIAL

## 2017-10-10 VITALS — BODY MASS INDEX: 28.78 KG/M2 | WEIGHT: 178 LBS | DIASTOLIC BLOOD PRESSURE: 66 MMHG | SYSTOLIC BLOOD PRESSURE: 104 MMHG

## 2017-10-10 DIAGNOSIS — Z34.01 ENCOUNTER FOR SUPERVISION OF NORMAL FIRST PREGNANCY IN FIRST TRIMESTER: Primary | ICD-10-CM

## 2017-10-10 DIAGNOSIS — G56.03 BILATERAL CARPAL TUNNEL SYNDROME: ICD-10-CM

## 2017-10-10 LAB
ERYTHROCYTE [DISTWIDTH] IN BLOOD BY AUTOMATED COUNT: 12.7 % (ref 10–15)
HCT VFR BLD AUTO: 34.5 % (ref 35–47)
HGB BLD-MCNC: 11.8 G/DL (ref 11.7–15.7)
MCH RBC QN AUTO: 30.9 PG (ref 26.5–33)
MCHC RBC AUTO-ENTMCNC: 34.2 G/DL (ref 31.5–36.5)
MCV RBC AUTO: 90 FL (ref 78–100)
PLATELET # BLD AUTO: 212 10E9/L (ref 150–450)
RBC # BLD AUTO: 3.82 10E12/L (ref 3.8–5.2)
TSH SERPL DL<=0.005 MIU/L-ACNC: 1.3 MU/L (ref 0.4–4)
WBC # BLD AUTO: 11.2 10E9/L (ref 4–11)

## 2017-10-10 PROCEDURE — 84443 ASSAY THYROID STIM HORMONE: CPT | Performed by: OBSTETRICS & GYNECOLOGY

## 2017-10-10 PROCEDURE — 99207 ZZC PRENATAL VISIT: CPT | Performed by: OBSTETRICS & GYNECOLOGY

## 2017-10-10 PROCEDURE — 87653 STREP B DNA AMP PROBE: CPT | Performed by: OBSTETRICS & GYNECOLOGY

## 2017-10-10 PROCEDURE — 36415 COLL VENOUS BLD VENIPUNCTURE: CPT | Performed by: OBSTETRICS & GYNECOLOGY

## 2017-10-10 PROCEDURE — 85027 COMPLETE CBC AUTOMATED: CPT | Performed by: OBSTETRICS & GYNECOLOGY

## 2017-10-10 NOTE — MR AVS SNAPSHOT
"              After Visit Summary   10/10/2017    Johana Duff    MRN: [id number]           Patient Information     Date Of Birth          1990        Visit Information        Provider Department      10/10/2017 10:40 AM [facility]        Today's Diagnoses     Encounter for supervision of normal first pregnancy in first trimester    -  1    Bilateral carpal tunnel syndrome          Care Instructions    Lab today.  Return to clinic in 1 week.  If you think your bag of water is broken; have bleeding like a period; think you are in labor; or are worried about your baby's movement, please call the labor and delivery unit at [phone].            Follow-ups after your visit        Who to contact     If you have questions or need follow up information about today's clinic visit or your schedule please contact [facility] directly at [phone].  Normal or non-critical lab and imaging results will be communicated to you by NewBayhart, letter or phone within 4 business days after the clinic has received the results. If you do not hear from us within 7 days, please contact the clinic through NewBayhart or phone. If you have a critical or abnormal lab result, we will notify you by phone as soon as possible.  Submit refill requests through Conjure or call your pharmacy and they will forward the refill request to us. Please allow 3 business days for your refill to be completed.          Additional Information About Your Visit        MyChart Information     Conjure lets you send messages to your doctor, view your test results, renew your prescriptions, schedule appointments and more. To sign up, go to [email or web address] . Click on \"Log in\" on the left side of the screen, which will take you to the Welcome page. Then click on \"Sign up Now\" on the right side of the page.     You will be asked to enter the access code listed below, as well as some personal information. " Please follow the directions to create your username and password.     Your access code is: [email or web address]  Expires: 2018 10:53 AM     Your access code will  in 90 days. If you need help or a new code, please call your [place] clinic or [phone].        Care EveryWhere ID     This is your Care EveryWhere ID. This could be used by other organizations to access your [place] medical records  [id number]        Your Vitals Were     Last Period BMI (Body Mass Index)                2017 28.78 kg/m2           Blood Pressure from Last 3 Encounters:   10/10/17 104/66   17 102/64   17 104/62    Weight from Last 3 Encounters:   10/10/17 178 lb (80.7 kg)   17 176 lb 6 oz (80 kg)   17 174 lb (78.9 kg)              We Performed the Following     CBC with platelets     Group B strep PCR     TSH        Primary Care Provider Office Phone # Fax #    MARIA LUISA Desir [phone]       [facility] [address]  [place] [address]        Equal Access to Services     [facility] : Hadii aad ku hadasho Soomaali, waaxda luqadaha, qaybta kaalmada adeegyada, waxay idiin hayaan adeeg mc lafernandon ah. So [facility] [phone].    ATENCIÓN: Si habla español, tiene a thomas disposición servicios gratuitos de asistencia lingüística. Jamie al [phone].    We comply with applicable federal civil rights laws and [place] laws. We do not discriminate on the basis of race, color, national origin, age, disability, sex, sexual orientation, or gender identity.            Thank you!     Thank you for choosing [facility]  for your care. Our goal is always to provide you with excellent care. Hearing back from our patients is one way we can continue to improve our services. Please take a few minutes to complete the written survey that you may receive in the mail after your visit with us. Thank you!             Your Updated Medication List - Protect others around  you: Learn how to safely use, store and throw away your medicines at www.disposemymeds.org.          This list is accurate as of: 10/10/17 10:53 AM.  Always use your most recent med list.                   Brand Name Dispense Instructions for use Diagnosis    PRENATAL 1 PO       Annual physical exam, Family planning

## 2017-10-10 NOTE — PATIENT INSTRUCTIONS
Lab today.  Return to clinic in 1 week.  If you think your bag of water is broken; have bleeding like a period; think you are in labor; or are worried about your baby's movement, please call the labor and delivery unit at [phone].

## 2017-10-10 NOTE — PROGRESS NOTES
36 Week Visit    Patient education provided on the following:  Baby-led feeding  Exclusivity of breastfeeding for the first 6 months  The importance of exclusive breastfeeding  Non-pharmalogical pain relief methods for labor  Frequency of feeding in relation to establishing a milk supply  Continuation of breastfeeding after introduction of appropriate complimentary foods    We reviewed the [place] [employer] Prenatal Toolkit in the [facility] Resource Book.  Patient questions and concerns addressed and reviewed. Support and encouragement provided.    HERNANDEZ TANG

## 2017-10-10 NOTE — PROGRESS NOTES
Group B, TSH,hp  rto 1 wk  If you think your bag is broken or you bleed like a period or you think you are in labor or you are worried about your baby movement please call the labor and delivery unit at [phone].

## 2017-10-11 LAB
GP B STREP DNA SPEC QL NAA+PROBE: NEGATIVE
SPECIMEN SOURCE: NORMAL

## 2017-10-17 ENCOUNTER — PRENATAL OFFICE VISIT (OUTPATIENT)
Dept: OBGYN | Facility: OTHER | Age: 27
End: 2017-10-17
Attending: OBSTETRICS & GYNECOLOGY
Payer: COMMERCIAL

## 2017-10-17 VITALS
BODY MASS INDEX: 29.09 KG/M2 | SYSTOLIC BLOOD PRESSURE: 108 MMHG | DIASTOLIC BLOOD PRESSURE: 62 MMHG | HEIGHT: 66 IN | WEIGHT: 181 LBS

## 2017-10-17 DIAGNOSIS — Z34.03 ENCOUNTER FOR SUPERVISION OF NORMAL FIRST PREGNANCY, THIRD TRIMESTER: Primary | ICD-10-CM

## 2017-10-17 PROCEDURE — 76815 OB US LIMITED FETUS(S): CPT | Performed by: OBSTETRICS & GYNECOLOGY

## 2017-10-17 PROCEDURE — 99207 ZZC PRENATAL VISIT: CPT | Mod: 25 | Performed by: OBSTETRICS & GYNECOLOGY

## 2017-10-17 ASSESSMENT — PAIN SCALES - GENERAL: PAINLEVEL: NO PAIN (0)

## 2017-10-17 NOTE — NURSING NOTE
"Chief Complaint   Patient presents with     Prenatal Care       Initial /62  Ht 5' 5.95\" (1.675 m)  Wt 181 lb (82.1 kg)  LMP 02/02/2017  BMI 29.26 kg/m2 Estimated body mass index is 29.26 kg/(m^2) as calculated from the following:    Height as of this encounter: 5' 5.95\" (1.675 m).    Weight as of this encounter: 181 lb (82.1 kg).  Medication Reconciliation: complete     Chani Whitman      "

## 2017-10-17 NOTE — MR AVS SNAPSHOT
After Visit Summary   10/17/2017    Johana Duff    MRN: [id number]           Patient Information     Date Of Birth          1990        Visit Information        Provider Department      10/17/2017 8:40 AM Vicky Benavidez MD [place] [facility]        Today's Diagnoses     Encounter for supervision of normal first pregnancy, third trimester    -  1      Care Instructions    Return to clinic in 1 week.  If you think your bag of water is broken; have bleeding like a period; think you are in labor; or are worried about your baby's movement, please call the labor and delivery unit at [phone].            Follow-ups after your visit        Your next 10 appointments already scheduled     Oct 24, 2017  8:40 AM CDT   (Arrive by 8:25 AM)   ESTABLISHED PRENATAL with Vicky Benavidez MD   [facility] [place] ([facility] [place] )    [address]  [place] [address]   [phone]            Oct 31, 2017  8:40 AM CDT   (Arrive by 8:25 AM)   ESTABLISHED PRENATAL with Vicky Benavidez MD   [facility] [place] ([facility] [place] )    [address]  [place] [address]   [phone]            Nov 07, 2017  8:40 AM CST   (Arrive by 8:25 AM)   ESTABLISHED PRENATAL with Vicky Benavidez MD   [facility] [place] )    [address]  [place] [address]   [phone]              Who to contact     If you have questions or need follow up information about today's clinic visit or your schedule please contact [facility] directly at [phone].  Normal or non-critical lab and imaging results will be communicated to you by MyChart, letter or phone within 4 business days after the clinic has received the results. If you do not hear from us within 7 days, please contact the clinic through MyChart or phone. If you have a critical or abnormal lab result, we will notify you by phone as soon as possible.  Submit  "refill requests through SquareClock or call your pharmacy and they will forward the refill request to us. Please allow 3 business days for your refill to be completed.          Additional Information About Your Visit        Thrive SoloharFashionStake Information     SquareClock lets you send messages to your doctor, view your test results, renew your prescriptions, schedule appointments and more. To sign up, go to [email or web address] . Click on \"Log in\" on the left side of the screen, which will take you to the Welcome page. Then click on \"Sign up Now\" on the right side of the page.     You will be asked to enter the access code listed below, as well as some personal information. Please follow the directions to create your username and password.     Your access code is: [email or web address]  Expires: 2018 10:53 AM     Your access code will  in 90 days. If you need help or a new code, please call your [place] clinic or [phone].        Care EveryWhere ID     This is your Care EveryWhere ID. This could be used by other organizations to access your [place] medical records  [id number]        Your Vitals Were     Height Last Period BMI (Body Mass Index)             5' 5.95\" (1.675 m) 2017 29.26 kg/m2          Blood Pressure from Last 3 Encounters:   10/17/17 108/62   10/10/17 104/66   17 102/64    Weight from Last 3 Encounters:   10/17/17 181 lb (82.1 kg)   10/10/17 178 lb (80.7 kg)   17 176 lb 6 oz (80 kg)              We Performed the Following     OB US LIMITED FETUS(S) (CLINIC OB/GYN PERFORMED)        Primary Care Provider Office Phone # Fax #    MARIA LUISA Desir [phone]       [facility] [address] [place] [address]        Equal Access to Services     [facility] JOSE : Reyna Burks, wapadmaja luqadaha, qaybta kaalsilke loera, suellen finch . [facility] [phone].    ATENCIÓN: Si jacinta robison, tiene a thomas disposición " servicios gratuitos de asistencia lingüística. Jamie chris [phone].    We comply with applicable federal civil rights laws and [place] laws. We do not discriminate on the basis of race, color, national origin, age, disability, sex, sexual orientation, or gender identity.            Thank you!     [facility] you for [facility]  [facility]. Our goal is always to provide you with excellent care. Hearing back from our patients is one way we can continue to improve our services. Please take a few minutes to complete the written survey that you may receive in the mail after your visit with us. Thank you!             Your Updated Medication List - Protect others around you: Learn how to safely use, store and throw away your medicines at www.disposemymeds.org.          This list is accurate as of: 10/17/17  1:17 PM.  Always use your most recent med list.                   Brand Name Dispense Instructions for use Diagnosis    PRENATAL 1 PO       Annual physical exam, Family planning

## 2017-10-17 NOTE — PATIENT INSTRUCTIONS
Return to clinic in 1 week.  If you think your bag of water is broken; have bleeding like a period; think you are in labor; or are worried about your baby's movement, please call the labor and delivery unit at [phone].

## 2017-10-24 ENCOUNTER — PRENATAL OFFICE VISIT (OUTPATIENT)
Dept: OBGYN | Facility: OTHER | Age: 27
End: 2017-10-24
Attending: OBSTETRICS & GYNECOLOGY
Payer: COMMERCIAL

## 2017-10-24 VITALS — WEIGHT: 185 LBS | SYSTOLIC BLOOD PRESSURE: 118 MMHG | DIASTOLIC BLOOD PRESSURE: 70 MMHG | BODY MASS INDEX: 29.91 KG/M2

## 2017-10-24 DIAGNOSIS — Z34.01 ENCOUNTER FOR SUPERVISION OF NORMAL FIRST PREGNANCY IN FIRST TRIMESTER: Primary | ICD-10-CM

## 2017-10-24 PROCEDURE — 99207 ZZC PRENATAL VISIT: CPT | Performed by: OBSTETRICS & GYNECOLOGY

## 2017-10-24 NOTE — MR AVS SNAPSHOT
After Visit Summary   10/24/2017    Johana Duff    MRN: [id number]           Patient Information     Date Of Birth          1990        Visit Information        Provider Department      10/24/2017 8:40 AM Vicky Benavidez MD [facility] [facility]        Today's Diagnoses     Encounter for supervision of normal first pregnancy in first trimester    -  1      Care Instructions    Return to clinic in 1 week.  If you think your bag of water is broken; have bleeding like a period; think you are in labor; or are worried about your baby's movement, please call the labor and delivery unit at [phone].  Fetal movement counts.          Follow-ups after your visit        Your next 10 appointments already scheduled     Oct 31, 2017  8:40 AM CDT   (Arrive by 8:25 AM)   ESTABLISHED PRENATAL with Vicky Benavidez MD   [facility] ([facility] [place] )    [address]  [place] [address]   [phone]            Nov 07, 2017  8:40 AM CST   (Arrive by 8:25 AM)   ESTABLISHED PRENATAL with Vicky Benavidez MD   [facility] [place] [facility]    [address]  [place] [address]   [phone]              Who to contact     If you have questions or need follow up information about today's clinic visit or your schedule please contact [facility] directly at [phone].  Normal or non-critical lab and imaging results will be communicated to you by MyChart, letter or phone within 4 business days after the clinic has received the results. If you do not hear from us within 7 days, please contact the clinic through MyChart or phone. If you have a critical or abnormal lab result, we will notify you by phone as soon as possible.  Submit refill requests through Catheter Connections or call your pharmacy and they will forward the refill request to us. Please allow 3 business days for your refill to be completed.          Additional Information About  "Your Visit        MyChart Information     Bon'App lets you send messages to your doctor, view your test results, renew your prescriptions, schedule appointments and more. To sign up, go to [email or web address] . Click on \"Log in\" on the left side of the screen, which will take you to the Welcome page. Then click on \"Sign up Now\" on the right side of the page.     You will be asked to enter the access code listed below, as well as some personal information. Please follow the directions to create your username and password.     Your access code is: [email or web address]  Expires: 2018 10:53 AM     Your access code will  in 90 days. If you need help or a new code, please call your [place] clinic or [phone].        Care EveryWhere ID     This is your Care EveryWhere ID. This could be used by other organizations to access your [place] medical records  [id number]        Your Vitals Were     Last Period BMI (Body Mass Index)                2017 29.91 kg/m2           Blood Pressure from Last 3 Encounters:   10/24/17 118/70   10/17/17 108/62   10/10/17 104/66    Weight from Last 3 Encounters:   10/24/17 185 lb (83.9 kg)   10/17/17 181 lb (82.1 kg)   10/10/17 178 lb (80.7 kg)              Today, you had the following     No orders found for display       Primary Care Provider Office Phone # Fax #    MARIA LUISA Desir [phone]       [facility] [address] [place] [address]        Equal Access to Services     [facility] : Hadii gwendolyn wilcox hadasho Soesau, waaxda luqadaha, qaybta kaalmada luz maria, suellen saleh. So [facility] [phone].    ATENCIÓN: Si habla español, tiene a thomas disposición servicios gratuitos de asistencia lingüística. Llame al [phone].    We comply with applicable federal civil rights laws and [place] laws. We do not discriminate on the basis of race, color, national origin, age, disability, sex, sexual orientation, " or gender identity.            Thank you!     Thank you for choosing [facility] [place]  for your care. Our goal is always to provide you with excellent care. Hearing back from our patients is one way we can continue to improve our services. Please take a few minutes to complete the written survey that you may receive in the mail after your visit with us. Thank you!             Your Updated Medication List - Protect others around you: Learn how to safely use, store and throw away your medicines at www.disposemymeds.org.          This list is accurate as of: 10/24/17 10:17 AM.  Always use your most recent med list.                   Brand Name Dispense Instructions for use Diagnosis    PRENATAL 1 PO       Annual physical exam, Family planning

## 2017-10-24 NOTE — PATIENT INSTRUCTIONS
Return to clinic in 1 week.  If you think your bag of water is broken; have bleeding like a period; think you are in labor; or are worried about your baby's movement, please call the labor and delivery unit at [phone].  Fetal movement counts.

## 2017-10-31 ENCOUNTER — PRENATAL OFFICE VISIT (OUTPATIENT)
Dept: OBGYN | Facility: OTHER | Age: 27
End: 2017-10-31
Attending: OBSTETRICS & GYNECOLOGY
Payer: COMMERCIAL

## 2017-10-31 VITALS — WEIGHT: 186 LBS | DIASTOLIC BLOOD PRESSURE: 70 MMHG | SYSTOLIC BLOOD PRESSURE: 112 MMHG | BODY MASS INDEX: 30.07 KG/M2

## 2017-10-31 DIAGNOSIS — Z34.01 ENCOUNTER FOR SUPERVISION OF NORMAL FIRST PREGNANCY IN FIRST TRIMESTER: Primary | ICD-10-CM

## 2017-10-31 PROBLEM — O12.03 GESTATIONAL EDEMA IN THIRD TRIMESTER: Status: ACTIVE | Noted: 2017-10-31

## 2017-10-31 PROCEDURE — 99207 ZZC PRENATAL VISIT: CPT | Performed by: OBSTETRICS & GYNECOLOGY

## 2017-10-31 NOTE — MR AVS SNAPSHOT
"              After Visit Summary   10/31/2017    Johana Duff    MRN: [id number]           Patient Information     Date Of Birth          1990        Visit Information        Provider Department      10/31/2017 8:40 AM Vicky Benavidez MD [facility]        Today's Diagnoses     Encounter for supervision of normal first pregnancy in first trimester    -  1       Follow-ups after your visit        Your next 10 appointments already scheduled     Nov 07, 2017  8:40 AM CST   (Arrive by 8:25 AM)   ESTABLISHED PRENATAL with Vicky Benavidez MD   [facility] ([facility] [place] )    [address]  [place] [address]   [phone]              Who to contact     If you have questions or need follow up information about today's clinic visit or your schedule please contact [facility] directly at [phone].  Normal or non-critical lab and imaging results will be communicated to you by MyChart, letter or phone within 4 business days after the clinic has received the results. If you do not hear from us within 7 days, please contact the clinic through MyChart or phone. If you have a critical or abnormal lab result, we will notify you by phone as soon as possible.  Submit refill requests through Applause or call your pharmacy and they will forward the refill request to us. Please allow 3 business days for your refill to be completed.          Additional Information About Your Visit        MyChart Information     Applause lets you send messages to your doctor, view your test results, renew your prescriptions, schedule appointments and more. To sign up, go to [email or web address] . Click on \"Log in\" on the left side of the screen, which will take you to the Welcome page. Then click on \"Sign up Now\" on the right side of the page.     You will be asked to enter the access code listed below, as well as some personal information. Please follow the directions to " create your username and password.     Your access code is: [email or web address]  Expires: 2018 10:53 AM     Your access code will  in 90 days. If you need help or a new code, please call your [place] clinic or [phone].        Care EveryWhere ID     This is your Care EveryWhere ID. This could be used by other organizations to access your [place] medical records  [id number]        Your Vitals Were     Last Period BMI (Body Mass Index)                2017 30.07 kg/m2           Blood Pressure from Last 3 Encounters:   10/31/17 112/70   10/24/17 118/70   10/17/17 108/62    Weight from Last 3 Encounters:   10/31/17 186 lb (84.4 kg)   10/24/17 185 lb (83.9 kg)   10/17/17 181 lb (82.1 kg)              Today, you had the following     No orders found for display       Primary Care Provider Office Phone # Fax #    MARIA LUISA Desir [phone]       [facility] [address] [address]        Equal Access to Services     [facility] AH: Hadii aad ku hadasho Soomaali, waaxda luqadaha, qaybta kaalmada adeegyada, waxay idiin hayaan adeeg sybilararossy lafernandon . So [place] [phone].    ATENCIÓN: Si habla español, tiene a thomas disposición servicios gratuitos de asistencia lingüística. Llame al [phone].    We comply with applicable federal civil rights laws and [place] laws. We do not discriminate on the basis of race, color, national origin, age, disability, sex, sexual orientation, or gender identity.            Thank you!     Thank you for choosing [facility]  for your care. Our goal is always to provide you with excellent care. Hearing back from our patients is one way we can continue to improve our services. Please take a few minutes to complete the written survey that you may receive in the mail after your visit with us. Thank you!             Your Updated Medication List - Protect others around you: Learn how to safely use, store and throw away your  medicines at www.disposemymeds.org.          This list is accurate as of: 10/31/17  9:34 AM.  Always use your most recent med list.                   Brand Name Dispense Instructions for use Diagnosis    PRENATAL 1 PO       Annual physical exam, Family planning

## 2017-11-02 NOTE — PATIENT INSTRUCTIONS
Return to clinic in 1 week.  If you think your bag of water is broken; have bleeding like a period; think you are in labor; or are worried about your baby's movement, please call the labor and delivery unit at [phone].

## 2017-11-07 ENCOUNTER — PRENATAL OFFICE VISIT (OUTPATIENT)
Dept: OBGYN | Facility: OTHER | Age: 27
End: 2017-11-07
Attending: OBSTETRICS & GYNECOLOGY
Payer: COMMERCIAL

## 2017-11-07 VITALS
OXYGEN SATURATION: 99 % | DIASTOLIC BLOOD PRESSURE: 70 MMHG | SYSTOLIC BLOOD PRESSURE: 110 MMHG | HEART RATE: 103 BPM | BODY MASS INDEX: 29.26 KG/M2 | WEIGHT: 181 LBS

## 2017-11-07 DIAGNOSIS — Z34.01 ENCOUNTER FOR SUPERVISION OF NORMAL FIRST PREGNANCY IN FIRST TRIMESTER: Primary | ICD-10-CM

## 2017-11-07 PROCEDURE — 99207 ZZC PRENATAL VISIT: CPT | Mod: 25 | Performed by: OBSTETRICS & GYNECOLOGY

## 2017-11-07 PROCEDURE — 76815 OB US LIMITED FETUS(S): CPT | Performed by: OBSTETRICS & GYNECOLOGY

## 2017-11-07 ASSESSMENT — PAIN SCALES - GENERAL: PAINLEVEL: NO PAIN (0)

## 2017-11-07 NOTE — MR AVS SNAPSHOT
"              After Visit Summary   2017    Johana Duff    MRN: [id number]           Patient Information     Date Of Birth          1990        Visit Information        Provider Department      2017 8:40 AM Vicky Benavidez MD [facility] [place]        Today's Diagnoses     Encounter for supervision of normal first pregnancy in first trimester    -  1       Follow-ups after your visit        Who to contact     If you have questions or need follow up information about today's clinic visit or your schedule please contact [facility] directly at [phone].  Normal or non-critical lab and imaging results will be communicated to you by KirkeWebhart, letter or phone within 4 business days after the clinic has received the results. If you do not hear from us within 7 days, please contact the clinic through Muecst or phone. If you have a critical or abnormal lab result, we will notify you by phone as soon as possible.  Submit refill requests through StandDesk or call your pharmacy and they will forward the refill request to us. Please allow 3 business days for your refill to be completed.          Additional Information About Your Visit        MyChart Information     StandDesk lets you send messages to your doctor, view your test results, renew your prescriptions, schedule appointments and more. To sign up, go to [email or web address] . Click on \"Log in\" on the left side of the screen, which will take you to the Welcome page. Then click on \"Sign up Now\" on the right side of the page.     You will be asked to enter the access code listed below, as well as some personal information. Please follow the directions to create your username and password.     Your access code is: [email or web address]  Expires: 2018  9:53 AM     Your access code will  in 90 days. If you need help or a new code, please call your [facility] or [phone].        Care EveryWhere ID     This is your Care " EveryWhere ID. This could be used by other organizations to access your [place] medical records  [id number]        Your Vitals Were     Pulse Last Period Pulse Oximetry BMI (Body Mass Index)          103 02/02/2017 99% 29.26 kg/m2         Blood Pressure from Last 3 Encounters:   11/07/17 110/70   10/31/17 112/70   10/24/17 118/70    Weight from Last 3 Encounters:   11/07/17 181 lb (82.1 kg)   10/31/17 186 lb (84.4 kg)   10/24/17 185 lb (83.9 kg)              Today, you had the following     No orders found for display       Primary Care Provider Office Phone # Fax #    MARIA LUISA Desir [phone]       [facility] [address]  [facility] [address]        Equal Access to Services     ASAEL GARCIA : Hadii aad ku hadasho Soomaali, waaxda luqadaha, qaybta kaalmada adeegyada, suellen bowden hayjacob finch . So [facility] [phone].    ATENCIÓN: Si habla español, tiene a thomas disposición servicios gratuitos de asistencia lingüística. Jamie al [phone].    We comply with applicable federal civil rights laws and [place] laws. We do not discriminate on the basis of race, color, national origin, age, disability, sex, sexual orientation, or gender identity.            Thank you!     Thank you for choosing [facility]  for your care. Our goal is always to provide you with excellent care. Hearing back from our patients is one way we can continue to improve our services. Please take a few minutes to complete the written survey that you may receive in the mail after your visit with us. Thank you!             Your Updated Medication List - Protect others around you: Learn how to safely use, store and throw away your medicines at www.disposemymeds.org.          This list is accurate as of: 11/7/17  9:29 AM.  Always use your most recent med list.                   Brand Name Dispense Instructions for use Diagnosis    PRENATAL 1 PO       Annual physical exam, Family  planning

## 2017-11-14 ENCOUNTER — HOSPITAL ENCOUNTER (INPATIENT)
Facility: HOSPITAL | Age: 27
LOS: 3 days | Discharge: HOME | End: 2017-11-17
Attending: OBSTETRICS & GYNECOLOGY | Admitting: OBSTETRICS & GYNECOLOGY
Payer: COMMERCIAL

## 2017-11-14 ENCOUNTER — PRENATAL OFFICE VISIT (OUTPATIENT)
Dept: OBGYN | Facility: OTHER | Age: 27
End: 2017-11-14
Attending: OBSTETRICS & GYNECOLOGY
Payer: COMMERCIAL

## 2017-11-14 VITALS — BODY MASS INDEX: 29.58 KG/M2 | DIASTOLIC BLOOD PRESSURE: 72 MMHG | SYSTOLIC BLOOD PRESSURE: 122 MMHG | WEIGHT: 183 LBS

## 2017-11-14 DIAGNOSIS — Z34.01 ENCOUNTER FOR SUPERVISION OF NORMAL FIRST PREGNANCY IN FIRST TRIMESTER: Primary | ICD-10-CM

## 2017-11-14 DIAGNOSIS — O48.0 POST TERM PREGNANCY, ANTEPARTUM CONDITION OR COMPLICATION: ICD-10-CM

## 2017-11-14 PROBLEM — Z37.9 NORMAL LABOR: Status: ACTIVE | Noted: 2017-11-14

## 2017-11-14 PROBLEM — Z36.89 ENCOUNTER FOR TRIAGE IN PREGNANT PATIENT: Status: ACTIVE | Noted: 2017-11-14

## 2017-11-14 LAB
ABO + RH BLD: NORMAL
ABO + RH BLD: NORMAL
BLD GP AB SCN SERPL QL: NORMAL
BLOOD BANK CMNT PATIENT-IMP: NORMAL
HGB BLD-MCNC: 12.3 G/DL (ref 11.7–15.7)
PLATELET # BLD AUTO: 252 10E9/L (ref 150–450)
SPECIMEN EXP DATE BLD: NORMAL

## 2017-11-14 PROCEDURE — 85018 HEMOGLOBIN: CPT | Performed by: OBSTETRICS & GYNECOLOGY

## 2017-11-14 PROCEDURE — 85049 AUTOMATED PLATELET COUNT: CPT | Performed by: OBSTETRICS & GYNECOLOGY

## 2017-11-14 PROCEDURE — 36415 COLL VENOUS BLD VENIPUNCTURE: CPT | Performed by: OBSTETRICS & GYNECOLOGY

## 2017-11-14 PROCEDURE — 86901 BLOOD TYPING SEROLOGIC RH(D): CPT | Performed by: OBSTETRICS & GYNECOLOGY

## 2017-11-14 PROCEDURE — 86900 BLOOD TYPING SEROLOGIC ABO: CPT | Performed by: OBSTETRICS & GYNECOLOGY

## 2017-11-14 PROCEDURE — 12000027 ZZH R&B OB

## 2017-11-14 PROCEDURE — 86850 RBC ANTIBODY SCREEN: CPT | Performed by: OBSTETRICS & GYNECOLOGY

## 2017-11-14 PROCEDURE — 76815 OB US LIMITED FETUS(S): CPT | Performed by: OBSTETRICS & GYNECOLOGY

## 2017-11-14 PROCEDURE — 87653 STREP B DNA AMP PROBE: CPT | Performed by: OBSTETRICS & GYNECOLOGY

## 2017-11-14 PROCEDURE — 99207 ZZC PRENATAL VISIT: CPT | Mod: 25 | Performed by: OBSTETRICS & GYNECOLOGY

## 2017-11-14 RX ORDER — MISOPROSTOL 200 UG/1
200 TABLET ORAL
Status: DISCONTINUED | OUTPATIENT
Start: 2017-11-14 | End: 2017-11-15

## 2017-11-14 RX ORDER — OXYTOCIN/0.9 % SODIUM CHLORIDE 30/500 ML
100-340 PLASTIC BAG, INJECTION (ML) INTRAVENOUS CONTINUOUS PRN
Status: DISCONTINUED | OUTPATIENT
Start: 2017-11-14 | End: 2017-11-15

## 2017-11-14 RX ORDER — TERBUTALINE SULFATE 1 MG/ML
INJECTION, SOLUTION SUBCUTANEOUS
Status: DISPENSED
Start: 2017-11-14 | End: 2017-11-15

## 2017-11-14 RX ORDER — CARBOPROST TROMETHAMINE 250 UG/ML
250 INJECTION, SOLUTION INTRAMUSCULAR
Status: DISCONTINUED | OUTPATIENT
Start: 2017-11-14 | End: 2017-11-15

## 2017-11-14 RX ORDER — METHYLERGONOVINE MALEATE 0.2 MG/ML
200 INJECTION INTRAVENOUS
Status: DISCONTINUED | OUTPATIENT
Start: 2017-11-14 | End: 2017-11-15

## 2017-11-14 RX ORDER — LIDOCAINE 50 MG/G
OINTMENT TOPICAL DAILY PRN
Status: DISCONTINUED | OUTPATIENT
Start: 2017-11-14 | End: 2017-11-15

## 2017-11-14 RX ORDER — IBUPROFEN 800 MG/1
800 TABLET, FILM COATED ORAL
Status: DISCONTINUED | OUTPATIENT
Start: 2017-11-14 | End: 2017-11-15

## 2017-11-14 RX ORDER — MISOPROSTOL 200 UG/1
800 TABLET ORAL ONCE
Status: DISCONTINUED | OUTPATIENT
Start: 2017-11-14 | End: 2017-11-15

## 2017-11-14 RX ORDER — ONDANSETRON 2 MG/ML
4 INJECTION INTRAMUSCULAR; INTRAVENOUS EVERY 6 HOURS PRN
Status: DISCONTINUED | OUTPATIENT
Start: 2017-11-14 | End: 2017-11-15

## 2017-11-14 NOTE — MR AVS SNAPSHOT
"              After Visit Summary   11/14/2017    Johana Duff    MRN: [id number]           Patient Information     Date Of Birth          1990        Visit Information        Provider Department      11/14/2017 2:50 PM Vicky Benavidez MD [place]        Today's Diagnoses     Encounter for supervision of normal first pregnancy in first trimester    -  1    Post term pregnancy, antepartum condition or complication           Follow-ups after your visit        Your next 10 appointments already scheduled     Nov 20, 2017  2:50 PM CST   (Arrive by 2:35 PM)   ESTABLISHED PRENATAL with Vicky Benavidez MD   [facility] [place] ([facility] )    [address] [place] [address]   [phone]              Who to contact     If you have questions or need follow up information about today's clinic visit or your schedule please contact [facility] directly at [phone].  Normal or non-critical lab and imaging results will be communicated to you by MyChart, letter or phone within 4 business days after the clinic has received the results. If you do not hear from us within 7 days, please contact the clinic through MyChart or phone. If you have a critical or abnormal lab result, we will notify you by phone as soon as possible.  Submit refill requests through [employer] or call your pharmacy and they will forward the refill request to us. Please allow 3 business days for your refill to be completed.          Additional Information About Your Visit        MyChart Information     [employer] lets you send messages to your doctor, view your test results, renew your prescriptions, schedule appointments and more. To sign up, go to [email or web address] . Click on \"Log in\" on the left side of the screen, which will take you to the Welcome page. Then click on \"Sign up Now\" on the right side of the page.     You will be asked to enter the access code listed below, as well " as some personal information. Please follow the directions to create your username and password.     Your access code is: [email or web address]  Expires: 2018  9:53 AM     Your access code will  in 90 days. If you need help or a new code, please call your [place] clinic or [phone].        Care EveryWhere ID     This is your Care EveryWhere ID. This could be used by other organizations to access your [place] medical records  [id number]        Your Vitals Were     Last Period BMI (Body Mass Index)                2017 29.58 kg/m2           Blood Pressure from Last 3 Encounters:   17 122/72   17 110/70   10/31/17 112/70    Weight from Last 3 Encounters:   17 183 lb (83 kg)   17 181 lb (82.1 kg)   10/31/17 186 lb (84.4 kg)              We Performed the Following     Group B strep PCR     US OB LIMITED, 1 OR MORE FETUSES        Primary Care Provider Office Phone # Fax #    MARIA LUISA Desir [phone]       [facility] [address]  [place] [address]        Equal Access to Services     ASAEL GARCIA AH: Hadii aad ku hadasho Soomaali, waaxda luqadaha, qaybta kaalmada adeegyada, suellen ballardn brandy saleh. So [facility] [phone].    ATENCIÓN: Si habla español, tiene a thomas disposición servicios gratuitos de asistencia lingüística. Jamie al [phone].    We comply with applicable federal civil rights laws and [place] laws. We do not discriminate on the basis of race, color, national origin, age, disability, sex, sexual orientation, or gender identity.            Thank you!     [facility]  for your care. Our goal is always to provide you with excellent care. Hearing back from our patients is one way we can continue to improve our services. Please take a few minutes to complete the written survey that you may receive in the mail after your visit with us. Thank you!             Your Updated Medication  List - Protect others around you: Learn how to safely use, store and throw away your medicines at www.disposemymeds.org.          This list is accurate as of: 11/14/17  4:42 PM.  Always use your most recent med list.                   Brand Name Dispense Instructions for use Diagnosis    PRENATAL 1 PO       Annual physical exam, Family planning

## 2017-11-14 NOTE — PROGRESS NOTES
Normal fluid  Repeat grB  rto Monday  NST and BPP Friday  Fm count and stillbirth discussed  If you think your bag is broken; you bleed like a period; you think you are in labor or you are worried about your baby movement please call the labor and delivery unit at [phone].

## 2017-11-14 NOTE — IP AVS SNAPSHOT
"                  MRN:[id number]                      After Visit Summary   11/14/2017    Johana Duff    MRN: [id number]           Thank you!     Thank you for choosing [facility] for your care. Our goal is always to provide you with excellent care. Hearing back from our patients is one way we can continue to improve our services. Please take a few minutes to complete the written survey that you may receive in the mail after you visit with us. Thank you!        Patient Information     Date Of Birth          1990        Designated Caregiver       Most Recent Value    Caregiver    Will someone help with your care after discharge? yes    Name of designated caregiver Hank    Phone number of caregiver [phone]      About your hospital stay     You were admitted on:  November 14, 2017 You last received care in the:  [facility]    You were discharged on:  November 17, 2017       Who to Call     For medical emergencies, please call 911.  For non-urgent questions about your medical care, please call your primary care provider or clinic, [phone]          Attending Provider     Provider Specialty    Vicky Benavidez MD OB/Gyn       Primary Care Provider Office Phone # Fax #    Debby Floyd [phone]      Your next 10 appointments already scheduled     Nov 28, 2017 10:40 AM CST   (Arrive by 10:25 AM)   Post Partum with Vicky Benavidez MD   [facility] [place] [facility] )    [address]  [place] [address]   [phone]              Further instructions from your care team       BREASTFEEDING TIPS  1. Breastfeed every 2-4 hours. If your baby is sleepy - use breast compression, push on chin to \"start up\" baby, switch breasts, undress to diaper and wake before relatching.   Some babies \"cluster\" feed every 1 hour for a while- this is normal. Feed your baby whenever he/she is awake-  even if every hour for a while. This frequent feeding will " "help you make more milk and encourage your baby to sleep for longer stretches later in the evening or night.    - Position your baby close to you with pillows so he/she is facing you -belly to belly laying horizontally across your lap at the level of your breast and looking a bit \"upwards\" to your breast   -One hand holds the baby's neck behind the ears and the other hand holds your breast  -Baby's nose should start out pointing to your nipple before latching  - Hold your breast in a \"sandwich\" position by gently squeezing your breast in an oval shape and make sure your hands are not covering the areola  This \"nipple sandwich\" will make it easier for your breast to fit inside the baby's mouth-making latching more comfortable for you and baby and preventing sore nipples. Your baby should take a \"mouthful\" of breast!  - You may want to use hand expression to \"prime the pump\" and get a drip of milk out on your nipple to wake baby   (see website: newborns.[place].[facility]/Breastfeeding/HandExpression.html)  - Swipe your nipple on baby's upper lip and wait for a BIG open mouth  - YOU bring baby to the breast (hold baby's neck with your fingers just below the ears) and bring baby's head to the breast--leading with the chin.  Try to avoid pushing your breast into baby's mouth- bring baby to you instead!  - Aim to get your baby's bottom lip LOW DOWN ON AREOLA (baby's upper lip just needs to \"clear\" the nipple) .   Your baby should latch onto the areola and NOT just the nipple. That way your baby gets more milk and you don't get sore nipples!      Useful web sites:  Www.infantrisk.com  Www.aap.org  Www.ibreastfeeding.com  Www.health.[facility].[place].us  Vaginal Delivery or  Birth   Discharge Instructions    Activity: Go back to your normal activities, except ***    Diet: You may eat a regular diet. Drink plenty of fluids.      Call your Doctor  if you have any of these symptoms:    * You soak a sanitary pad with blood within 1 " "hour, or you see clots larger than a  golf ball.    * Bleeding that lasts more than 6 weeks.     *Bad-smelling fluid that comes out of your vagina.    *A fever above 100.4 degrees Fahrenheit (38.4 degrees Celsius) with or without chills.    * Severe pain, cramping, or tenderness in your lower belly area.    * Increased pain, swelling, redness or fluid around your stitches.    * A need to urinate more frequently (use the toilet more often), more urgently (use the toilet very quickly), or it burns when you urinate.    * Redness, swelling, or pain around a vein in your leg.    * Problems coping with sadness, anxiety, or depression.    * Problems breastfeeding, or a red or painful area on your breast.    * You have questions or concerns after you return home.      [facility] [place]: [phone]    Pending Results     No orders found from 11/12/2017 to 11/15/2017.            Statement of Approval     Ordered          11/17/17 0805  I have reviewed and agree with all the recommendations and orders detailed in this document.  EFFECTIVE NOW     Approved and electronically signed by:  Marilin Shaffer NP             Admission Information     Date & Time Provider Department Dept. Phone    11/14/2017 Vicky Benavidez MD [facility] [phone]      Your Vitals Were     Blood Pressure Pulse Temperature Respirations Height Weight    118/50 70 97.9  F (36.6  C) (Oral) 16 1.702 m (5' 7\") 83.9 kg (184 lb 15.5 oz)    Last Period Pulse Oximetry BMI (Body Mass Index)             02/02/2017 96% 28.97 kg/m2         Buyanihan Information     Buy[employer] lets you send messages to your doctor, view your test results, renew your prescriptions, schedule appointments and more. To sign up, go to [email or web address] . Click on \"Log in\" on the left side of the screen, which will take you to the Welcome page. Then click on \"Sign up Now\" on the right side of the page.     You will be asked to enter the access code listed " below, as well as some personal information. Please follow the directions to create your username and password.     Your access code is: [email or web address]  Expires: 2018  9:53 AM     Your access code will  in 90 days. If you need help or a new code, please call your [place] clinic or [phone].        Care EveryWhere ID     This is your Care EveryWhere ID. This could be used by other organizations to access your [place] medical records  [id number]        Equal Access to Services     JOHN [facility] : Hadbren wilcox hadandersono Soomaali, waaxda luqadaha, qaybta kaalmada adedaneyadahlia, suellen finch . So [facility] [phone].    ATENCIÓN: Si habla español, tiene a thomas disposición servicios gratuitos de asistencia lingüística. Llame al [phone].    We comply with applicable federal civil rights laws and [place] laws. We do not discriminate on the basis of race, color, national origin, age, disability, sex, sexual orientation, or gender identity.               Review of your medicines      CONTINUE these medicines which have NOT CHANGED        Dose / Directions    PRENATAL 1 PO   Used for:  Annual physical exam, Family planning        Refills:  0                Protect others around you: Learn how to safely use, store and throw away your medicines at www.disposemymeds.org.             Medication List: This is a list of all your medications and when to take them. Check marks below indicate your daily home schedule. Keep this list as a reference.      Medications           Morning Afternoon Evening Bedtime As Needed    PRENATAL 1 PO

## 2017-11-14 NOTE — IP AVS SNAPSHOT
[facility]    [address] [address]    Phone:  [phone]    Fax:  [phone]                                       After Visit Summary   11/14/2017    Johana Duff    MRN: [id number]           After Visit Summary Signature Page     I have received my discharge instructions, and my questions have been answered. I have discussed any challenges I see with this plan with the nurse or doctor.    ..........................................................................................................................................  Patient/Patient Representative Signature      ..........................................................................................................................................  Patient Representative Print Name and Relationship to Patient    ..................................................               ................................................  Date                                            Time    ..........................................................................................................................................  Reviewed by Signature/Title    ...................................................              ..............................................  Date                                                            Time

## 2017-11-14 NOTE — PATIENT INSTRUCTIONS
Group B strep today.  Return to clinic in 1 week.  If you think your bag of water is broken; have bleeding like a period; think you are in labor; or are worried about your baby's movement, please call the labor and delivery unit at [phone].  Fetal movement count discussed.  NST and BPP on Friday.

## 2017-11-15 PROBLEM — Z37.9 NORMAL LABOR: Status: RESOLVED | Noted: 2017-11-14 | Resolved: 2017-11-15

## 2017-11-15 PROBLEM — Z36.89 ENCOUNTER FOR TRIAGE IN PREGNANT PATIENT: Status: RESOLVED | Noted: 2017-11-14 | Resolved: 2017-11-15

## 2017-11-15 PROBLEM — O12.03 GESTATIONAL EDEMA IN THIRD TRIMESTER: Status: RESOLVED | Noted: 2017-10-31 | Resolved: 2017-11-15

## 2017-11-15 LAB
GP B STREP DNA SPEC QL NAA+PROBE: NEGATIVE
SPECIMEN SOURCE: NORMAL

## 2017-11-15 PROCEDURE — 0UQK3ZZ REPAIR HYMEN, PERCUTANEOUS APPROACH: ICD-10-PCS | Performed by: OBSTETRICS & GYNECOLOGY

## 2017-11-15 PROCEDURE — 59400 OBSTETRICAL CARE: CPT | Performed by: OBSTETRICS & GYNECOLOGY

## 2017-11-15 PROCEDURE — 25000132 ZZH RX MED GY IP 250 OP 250 PS 637: Performed by: OBSTETRICS & GYNECOLOGY

## 2017-11-15 PROCEDURE — 72200001 ZZH LABOR CARE VAGINAL DELIVERY SINGLE: Performed by: OBSTETRICS & GYNECOLOGY

## 2017-11-15 PROCEDURE — S0191 MISOPROSTOL, ORAL, 200 MCG: HCPCS | Performed by: OBSTETRICS & GYNECOLOGY

## 2017-11-15 PROCEDURE — 10907ZC DRAINAGE OF AMNIOTIC FLUID, THERAPEUTIC FROM PRODUCTS OF CONCEPTION, VIA NATURAL OR ARTIFICIAL OPENING: ICD-10-PCS | Performed by: OBSTETRICS & GYNECOLOGY

## 2017-11-15 PROCEDURE — 0HQ9XZZ REPAIR PERINEUM SKIN, EXTERNAL APPROACH: ICD-10-PCS | Performed by: OBSTETRICS & GYNECOLOGY

## 2017-11-15 PROCEDURE — 25000125 ZZHC RX 250

## 2017-11-15 PROCEDURE — 12000031 ZZH R&B OB CRITICAL

## 2017-11-15 RX ORDER — ACETAMINOPHEN 325 MG/1
325 TABLET ORAL EVERY 4 HOURS PRN
Status: DISCONTINUED | OUTPATIENT
Start: 2017-11-15 | End: 2017-11-17 | Stop reason: HOSPADM

## 2017-11-15 RX ORDER — LANOLIN 100 %
OINTMENT (GRAM) TOPICAL
Status: DISCONTINUED | OUTPATIENT
Start: 2017-11-15 | End: 2017-11-17 | Stop reason: HOSPADM

## 2017-11-15 RX ORDER — IBUPROFEN 800 MG/1
800 TABLET, FILM COATED ORAL EVERY 8 HOURS
Status: DISCONTINUED | OUTPATIENT
Start: 2017-11-15 | End: 2017-11-17 | Stop reason: HOSPADM

## 2017-11-15 RX ORDER — LIDOCAINE 50 MG/G
OINTMENT TOPICAL
Status: DISCONTINUED | OUTPATIENT
Start: 2017-11-15 | End: 2017-11-17 | Stop reason: HOSPADM

## 2017-11-15 RX ADMIN — IBUPROFEN 800 MG: 800 TABLET ORAL at 14:35

## 2017-11-15 RX ADMIN — MISOPROSTOL 200 MCG: 200 TABLET ORAL at 06:35

## 2017-11-15 RX ADMIN — IBUPROFEN 800 MG: 800 TABLET ORAL at 06:45

## 2017-11-15 RX ADMIN — LIDOCAINE HYDROCHLORIDE 1 ML: 10 INJECTION, SOLUTION INFILTRATION; PERINEURAL at 06:00

## 2017-11-15 RX ADMIN — MISOPROSTOL 800 MCG: 200 TABLET ORAL at 06:35

## 2017-11-15 ASSESSMENT — PAIN DESCRIPTION - DESCRIPTORS: DESCRIPTORS: SORE;ACHING

## 2017-11-15 NOTE — PLAN OF CARE
Problem: Patient Care Overview  Goal: Plan of Care/Patient Progress Review  Outcome: Improving   11/15/17 0811   OTHER   Plan Of Care Reviewed With patient   Plan of Care Review   Progress improving       Problem: Postpartum (Vaginal Delivery) (Adult,Obstetrics,Pediatric)  Goal: Signs and Symptoms of Listed Potential Problems Will be Absent, Minimized or Managed (Postpartum)  Signs and symptoms of listed potential problems will be absent, minimized or managed by discharge/transition of care (reference Postpartum (Vaginal Delivery) (Adult,Obstetrics,Pediatric) CPG).  Outcome: Improving   11/15/17 0811   Postpartum (Vaginal Delivery)   Problems Assessed (Postpartum Vaginal Delivery) all   Problems Present (Postpartum Vag Deliv) none   Assessments completed as charted. B/P: 104/69, T: 98.5, P: 92, R: 16. Rates pain: 7/10 Ibuprofen 800mg given per order. Voiding without difficulty. Fundus: Midline U2 firm. Lochia: Light. Activity: moving with difficulty due to perineal pain. Infant feeding: Breast feeding attempt at shift change.

## 2017-11-15 NOTE — H&P
ADMISSION NOTE FOR Johana Duff on 2017.    H and P unchanged from prenatal  Lungs clear  Heart RRR    Johana Duff is a 27 year old female  40w5d  Estimated Date of Delivery: 2017 is calculated from Patient's last menstrual period was 2017. is [facility] 2017 at 10:04 PM  in early labor.     Membranes are intact     PRENATAL COURSE   Prenatal vital signs WNL   Prenatal course was essentially uncomplicated     HISTORIES   Allergies   Allergen Reactions     Blue Dyes      Red Dye       Past Medical History:   Diagnosis Date     Ovarian cyst 2002    left      Past Surgical History:   Procedure Laterality Date     wisdom teeth extraction        Family History   Problem Relation Age of Onset     CEREBROVASCULAR DISEASE Maternal Grandmother      complications     Hypertension Maternal Grandmother      Family History Negative Mother      Coronary Artery Disease Maternal Grandfather      s/p stents     Lung Cancer Maternal Grandfather      +tobacco     Family History Negative Father      lymes     Family History Negative Sister      Emphysema Paternal Grandmother      +tobacco     Family History Negative Sister      Family History Negative Sister      Emphysema Paternal Grandfather      +tobacco      Social History   Substance Use Topics     Smoking status: Never Smoker     Smokeless tobacco: Never Used     Alcohol use Yes      Comment: 3/year      Obstetric History       T0      L0     SAB0   TAB0   Ectopic0   Multiple0   Live Births0       # Outcome Date GA Lbr Kamlesh/2nd Weight Sex Delivery Anes PTL Lv   2 Current            1 AB 16 7w0d    AB, INEVITAB              LABS:     Lab Results   Component Value Date    ABO B 2016           Lab Results   Component Value Date    RH  Pos 2016      Rhogam not indicated   Lab Results   Component Value Date    RUBELLAABIGG 31 2013      No results found for: RPR   No results found for:  "HIV   Lab Results   Component Value Date    HGB 11.8 10/10/2017      Lab Results   Component Value Date    HEPBANG Nonreactive 05/04/2016      Lab Results   Component Value Date    GBS Negative 10/10/2017      Other significant lab:    None.     PHYSICAL EXAM:     /68  Pulse 83  Resp 18  Ht 1.702 m (5' 7\")  Wt 83.9 kg (184 lb 15.5 oz)  LMP 02/02/2017  SpO2 98%  BMI 28.97 kg/m2  Neuro: denies headache and visual disturbances   Edema 2+  Reflexes 2+     Abdomen: gravid, single vertex fetus, non-tender, EFW 9     FETAL HEART TONES cat I  Cervix 3/C/-3    ASSESSMENT:   IUP @ 40w5d in early labor.  NST reactive.      PLAN:   AROM=Mec     Vicky Benavidez MD      "

## 2017-11-15 NOTE — PLAN OF CARE
OB Triage Note  Johana Duff  MRN: [id number]  Gestational Age: 40w5d      Johana Duff presents for increase frequency of contractions. (sign/symptom/concern).      States tasha since  3 am.  Rates pain at 7/10.  Bleeding: NONE   Denies LOF.     Dr Benavidez notified of arrival and condition.  Oriented patient to surroundings. Call light within reach.     FHT: 130's  NST Start Time:  NST Stop Time:  NST: Reactive   Uterine Assessment:Contractions: frequency q 2-3 minutes and Duration 40-90 seconds of moderate quality.    Plan:  -Initial NST, then fetal/uterine monitoring per MD/patient plan.  -Sterile vaginal exam/sterile speculum exam.  -Nursing education on labor provided.

## 2017-11-15 NOTE — PLAN OF CARE
Obstetric Phone Triage- [facility]    **REMEMBER: Review patient's prenatal record including complications with pregnancy and medications patient may be on (insulins, tocolytic, etc.)**      2017 9:29 PM  Johana Duff 1990   Home Phone [phone]   Mobile [phone]                       Last office visit/Cervix exam: 17   EDC 17 Gestational Age 40w 5d weeks    Spoke with   Patient lives 8 blocks away    Reason for call per pt: pt is tasha and is not sure if she should come to the hospital yet    Is your provider aware of this concern? no    Did you have any complications with this or a previous pregnancy? (Pre-term labor, C/S, Previa, pre-eclampsia, diabetes) no    Are you having contractions? Yes   -If yes: Contractions frequency q 4-11 minutes and irregular    When did they start? 0300    Have you palpated your contractions? Yes    Describe contraction discomfort (ex: having to breath through    contractions, stop what you are doing, etc.): yes- pt is moaning through contractions        What types of activity have you done in the past 24 hours? Went to the doctors office           Are you having any bloody show/Bleeding? No     Are you having any new vaginal discharge or are you leaking fluids/has your water broken? no     Is your baby moving normally? Yes    Additional Concerns (abd pain, headache, swelling, visual changes, etc): nothing at this time     Patient Advised: You are always more than welcome to come in and be evaluated. If you decide to stay home, please give us a call and update us in one hour. Pt called back about 30 min later to let us know she was coming in to be evaluated.     Call taken by: RONALD Richardson RN

## 2017-11-15 NOTE — PLAN OF CARE
Patient up to bathroom, tolerated well. Mary cares done, linen changed. Fundus firm U/1 scant bleeding after ambulating.  Will continue to monitor.

## 2017-11-15 NOTE — L&D DELIVERY NOTE
"Delivery Summary    Johana Duff MRN# [id number]   Age: 27 year old YOB: 1990     ASSESSMENT & PLAN:  2nd posterior lac and external abrasions revision of hymeneal ring loop done at patients request. Had been discussed in office.        Labor Length    3rd Stage (hrs):  0 (min):  4      Labor Events     labor?:  No   Predominate monitoring during 1st stage:  intermittent electronic fetal monitoring      Antibiotics received during labor?:  No      Rupture identifier:  Rupture 1   Rupture date/time:     Rupture type:  Artificial Rupture of Membranes   Fluid color:  Meconium   Fluid odor:  Normal      1:1 continuous labor support provided by?:  RN Labor partogram used?:  no         Delivery/Placenta Date and Time    Delivery Date:  11/15/17 Delivery Time:   6:28 AM   Placenta Date/Time:  11/15/2017  6:32 AM      Vaginal Counts        Needles Suture [place] Sponges Instruments   Initial counts       Added to count       Final counts          Placed during labor Accounted for at the end of labor   No    No    No       Final count performed by 2 team members:   Two Team Members   EJ         Final count correct?:  Yes         Apgars     1 Minute 5 Minute 10 Minute 15 Minute 20 Minute   Skin color: 0  1       Heart rate: 2  2       Reflex irritability: 2  2       Muscle tone: 2  2       Respiratory effort: 2  2       Total: 8  9          Apgars assigned by:  BRITTANEY EJ      Cord    Vessels:  3 Vessels Complications:  None   Cord Blood Disposition:  Lab Gases Sent?:  No         [place] Resuscitation    Methods:  None       Care at Delivery:  Vigorous baby to mom's abdomen   Output in Delivery Room:  Stool      [place] Measurements    Weight:  9 lb 3.4 oz Length:  1' 8.5\"      Skin to Skin and Feeding Plan    Skin to skin initiation date/time:     Skin to skin with:  Mother   Skin to skin end date/time:     How do you plan to feed your baby:  Breastfeeding      Labor Events and Shoulder " Dystocia    Fetal Tracing Prior to Delivery:  Category 1   Shoulder dystocia present?:  Neg            Delivery (Maternal) (Provider to Complete) (516282)    Episiotomy:  None   Perineal lacerations:  1st, 2nd Repaired?:  Yes   Labial laceration:  bilateral Repaired?:  No   Vaginal laceration?:  Yes Repaired?:  Yes   Cervical laceration?:  No    Est. blood loss (mL):  150         Mother's Information  Mother: Johana Duff #[id number]    Start of Mother's Information     IO Blood Loss  11/14/17 1828 - 11/15/17 0720    Mom's I/O Activity            End of Mother's Information  Mother: Johana Duff #[id number]            Delivery - Provider to Complete (093906)    Delivering clinician:  VICKY BENAVIDEZ   Attempted Delivery Types (Choose all that apply):  Spontaneous Vaginal Delivery   Delivery Type (Choose the 1 that will go to the Birth History):  Vaginal, Spontaneous Delivery                           Placenta    Delayed Cord Clamping:  Done   Date/Time:  11/15/2017  6:32 AM   Removal:  Spontaneous   Comments:  mec stained   Disposition:  Hospital disposal      Anesthesia    Method:  Local, TOPICAL         Presentation and Position    Presentation:  Vertex   Position:  Left Occiput Anterior                    Vicky Benavidez MD, MD

## 2017-11-15 NOTE — PLAN OF CARE
Face to face report given with opportunity to observe patient.    Report given to Janelle Melgoza   11/15/2017  3:36 PM

## 2017-11-15 NOTE — LACTATION NOTE
"This note was copied from a baby's chart.  Initial Lactation Consultation    Baby1 Johana Duff                                                                                                    [id number]    Consultation Date: 11/15/2017    Reason for Lactation Referral:routine lactation assessment.    MATERNAL HISTORY   Maternal History: 1st baby, vaginal delivery  History of Breast Surgery: No  Breast Changes During Pregnancy: Yes  Breast Feeding History: No  Maternal Meds: see eMar    MATERNAL ASSESSMENT    Breast Size: average  Nipple Appearance - Left: intact  Nipple Appearance - Right: intact  Nipple Erectility - Left: erect with stimulation  Nipple Erectility - Right: erect with stimulation  Areolas Compressibility: soft  Nipple Size: average  Milk Supply: colostrum    INFANT ASSESSMENT    Oral Anatomy  Mouth: normal  Palate: normal  Jaw: normal  Tongue: normal    FEEDING   Feeding Time:1600  Position: right breast, cradle  Effort to Latch: did not nurse, baby spitting up clear mucous  Results: attempted breast feed    FEEDING PLAN    Inpatient Feeding Plan: Nurse on demand, responding to infant's feeding cues. Snuggle in skin-to-skin to learn positioning and infant cues. Rooming-in encouraged.    LACTATION COMMENTS   Anticipatory guidance provided in regard to \"baby's second night.\"    Link provided for [employer] Deep Latch video.    Deep latch explained for proper positioning of breast in infant's mouth, maximizing milk transfer and comfort.  Hand expression taught and return demonstration observed with colostrum present.   signs of satiety reviewed.  \"Ways to know that baby is getting enough\" discussed thoroughly.  Follow-up support information provided.    __________________________________________________________________________________  SOLEDAD SLOAN RN, IBCLC  11/15/2017      "

## 2017-11-15 NOTE — PLAN OF CARE
Checked in on pt. Resting comfortably in bed. Babe rooming in and bonding well. Reports relief noted from Ibuprofen. Denies any needs or concerns at this time. Will continue to monitor.

## 2017-11-15 NOTE — PLAN OF CARE
Labor Admission  Johana Duff  MRN: [id number]  Gestational Age: 40w5d      Johana Duff is admitted for active labor.  States tasha since 3 am.  Rates pain at 6/10.  Denies LOF.    Dr Benavidez notified of arrival and condition and Intrapartum orders initiated.      FHT: 140  NST: Reactive   Uterine Assessment: frequency q 2-3 minutes and Duration 40-90 seconds, Contractions: {UTERINE      Patient is alert and oriented X 3,Patient oriented to room, unit, hourly rounding, and plan of care.  Call light within reach. Explained admission packet with patient bill of rights brochure. Will continue to monitor and document as needed.     Inpatient nursing criteria listed below was met:    Health care directives status obtained and documented: No  Patient identifies a surrogate decision maker: Yes   If yes, who:Hank Contact Information: [phone]  Core Measure diagnosis present:: No  Vaccine assessment done and vaccines ordered if appropriate. Yes  Clergy visit ordered if patient requests: No  Skin issues/needs documented:No  Isolation needs addressed, if appropriate: N/A  Fall Prevention (Med and High risk): Care plan updated, Education given and documented and signage used: N/A  Care Plan initiated: Yes  Education Documented (Reminder to educate patient if MRSA is present on admission): Yes  Education Assessment documented:Yes  Patient has discharge needs (If yes, please explain): No

## 2017-11-15 NOTE — PLAN OF CARE
Face to face report given with opportunity to observe patient.    Report given to Rani Leggett   11/15/2017  9:36 AM

## 2017-11-16 LAB — HGB BLD-MCNC: 10.7 G/DL (ref 11.7–15.7)

## 2017-11-16 PROCEDURE — 25000132 ZZH RX MED GY IP 250 OP 250 PS 637: Performed by: OBSTETRICS & GYNECOLOGY

## 2017-11-16 PROCEDURE — 12000031 ZZH R&B OB CRITICAL

## 2017-11-16 PROCEDURE — 85018 HEMOGLOBIN: CPT | Performed by: OBSTETRICS & GYNECOLOGY

## 2017-11-16 PROCEDURE — 36415 COLL VENOUS BLD VENIPUNCTURE: CPT | Performed by: OBSTETRICS & GYNECOLOGY

## 2017-11-16 RX ADMIN — IBUPROFEN 800 MG: 800 TABLET ORAL at 18:11

## 2017-11-16 RX ADMIN — IBUPROFEN 800 MG: 800 TABLET ORAL at 00:16

## 2017-11-16 RX ADMIN — IBUPROFEN 800 MG: 800 TABLET ORAL at 08:22

## 2017-11-16 NOTE — PROGRESS NOTES
DAILY NOTE - POSTPARTUM DAY 1    SUBJECTIVE:  No complaints    Tolerating a regular diet? YES  Ambulating? YES  Voiding without difficulty? Yes  Lochia? minimal  Breastfeeding:  Yes but not going well yet      OBJECTIVE:  Vitals:    11/15/17 0926 11/15/17 1002 11/15/17 1625 11/16/17 0053   BP: 104/57 112/59 109/57 97/59   Pulse:   78 78   Resp:   16 16   Temp:   98.6  F (37  C) 98.3  F (36.8  C)   TempSrc:   Oral Oral   SpO2:  96% 99% 98%   Weight:       Height:           Constitutional: Alert and oriented, no acute distress    Abdomen: Uterine fundus is firm, non-tender and at the level of the umbilicus    Extremeties:  neg edema, neg Elizabeth's sign    LABS:  Hemoglobin   Date Value Ref Range Status   11/16/2017 10.7 (L) 11.7 - 15.7 g/dL Final   11/14/2017 12.3 11.7 - 15.7 g/dL Final     Lab Results   Component Value Date    RUBELLAABIGG 31 09/16/2013      Lab Results   Component Value Date    ABO B 11/14/2017           Lab Results   Component Value Date    RH Pos 11/14/2017        ASSESSMENT:  Post-partum day #1  Normal spontaneous vaginal delivery  Doing well.     PLAN:   Breastfeeding support  Discharge when ready

## 2017-11-16 NOTE — PLAN OF CARE
Problem: Postpartum (Vaginal Delivery) (Adult,Obstetrics,Pediatric)  Goal: Signs and Symptoms of Listed Potential Problems Will be Absent, Minimized or Managed (Postpartum)  Signs and symptoms of listed potential problems will be absent, minimized or managed by discharge/transition of care (reference Postpartum (Vaginal Delivery) (Adult,Obstetrics,Pediatric) CPG).   Outcome: Improving   11/16/17 0844   Postpartum (Vaginal Delivery)   Problems Assessed (Postpartum Vaginal Delivery) all   Problems Present (Postpartum Vag Deliv) none     Assessments completed as charted. B/P: 100/51, T: 98.3, P: 88, R: 16. Rates pain: 1/10 perineum pain, see eMar for medication administration. Voiding without difficulty. Fundus: Midline firm u/1. Lochia: Light. Activity: normal activity. Infant feeding: Breast feeding going not well, mild difficulty with latch.     LATCH Score: Not assessed at this time.    Postpartum breastfeeding assessment completed and education provided, see Patient Education Activity.  Items included in the education are:     proper positioning and latch    effectiveness of feeding    manual expression    handling and storing breastmilk    maintenance of breastfeeding for the first 6 months    sign/symptoms of infant feeding issues requiring referral to qualified health care provider  Postpartum care education provided, see Patient Education activity. Patient denies needs. Will monitor.  Aurora Leggett

## 2017-11-16 NOTE — PLAN OF CARE
Problem: Patient Care Overview  Goal: Plan of Care/Patient Progress Review  Outcome: Improving   17 06   OTHER   Plan Of Care Reviewed With patient   Plan of Care Review   Progress improving   Assessments completed as charted. B/P: 97/59, T: 98.3, P: 78, R: 16. Rates pain: 2/10 ibuprofen given with results . Voiding without difficulty. Fundus: Midline firm u/2. Lochia: Light. Activity: moving with difficulty due to perineal pain. Infant feeding: Breast feeding going not well, baby sleepys et not latching well, only 1 good latch score this shift. Having to hand express.     LATCH Score:   Latch: 1 - Repeated Attempts  Audible Swallowin - Few  Type of Nipple: (Breast/Nipple) 2 - Everted  Comfort: 2 - Soft, Nontender  Hold: 1 - Min. Assist   Total LATCH Score: 7    Postpartum breastfeeding assessment completed and education provided, see Patient Education Activity.  Items included in the education are:     proper positioning and latch    effectiveness of feeding    manual expression    handling and storing breastmilk    maintenance of breastfeeding for the first 6 months    sign/symptoms of infant feeding issues requiring referral to qualified health care provider  Postpartum care education provided, see Patient Education activity. Patient denies needs. Will monitor.  Anne Marie Mantilla        Problem: Postpartum (Vaginal Delivery) (Adult,Obstetrics,Pediatric)  Goal: Signs and Symptoms of Listed Potential Problems Will be Absent, Minimized or Managed (Postpartum)  Signs and symptoms of listed potential problems will be absent, minimized or managed by discharge/transition of care (reference Postpartum (Vaginal Delivery) (Adult,Obstetrics,Pediatric) CPG).   Outcome: Improving   17   Postpartum (Vaginal Delivery)   Problems Assessed (Postpartum Vaginal Delivery) all   Problems Present (Postpartum Vag Deliv) none       Face to face report given with opportunity to observe patient.    Report given to  JAZ Hull   11/16/2017  7:28 AM

## 2017-11-16 NOTE — PROGRESS NOTES
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 She is now over-income and does not qualify, nor does she feel like she needs additional food suppport  Mental Health: denies need  Violence: denies  Substance Abuse: does not smoke, rarely drinks alcohol, and denies street drug use    Adequate resources for needs (housing, utilities, food/med): she and her  feel they have sufficient resources    Transportation:  She and Hank both drive and have vehicles  Car Seat: Yes  Diapers:  Yes  Crib or Bassinet: yes    Education concerns on self/baby care:   No, she feels she has been well supported with education by nursing and family and friends; she is breast feeding and acknowledges that she has struggled some with this, but feels she and baby are making progress.

## 2017-11-16 NOTE — PLAN OF CARE
Problem: Postpartum (Vaginal Delivery) (Adult,Obstetrics,Pediatric)  Goal: Signs and Symptoms of Listed Potential Problems Will be Absent, Minimized or Managed (Postpartum)  Signs and symptoms of listed potential problems will be absent, minimized or managed by discharge/transition of care (reference Postpartum (Vaginal Delivery) (Adult,Obstetrics,Pediatric) CPG).   Outcome: Improving   17 1753   Postpartum (Vaginal Delivery)   Problems Assessed (Postpartum Vaginal Delivery) all   Problems Present (Postpartum Vag Deliv) none     Assessments completed as charted. B/P: 109/64, T: 98.2, P: 93, R: 16.  Voiding without difficulty. Fundus: Midline firm U/1. Lochia: Light. Activity: moving with difficulty due to perineal pain. Infant feeding: Breast feeding going not well, having mild difficulty and pain with latch.     LATCH Score:   Latch: 1 - Repeated Attempts  Audible Swallowin - Spontaneous & frequent  Type of Nipple: (Breast/Nipple) 2 - Everted  Comfort: 1 - Filling, small blisters, mild/mod pain  Hold: 1 - Min. Assist   Total LATCH Score:  7    Postpartum breastfeeding assessment completed and education provided, see Patient Education Activity.  Items included in the education are:     proper positioning and latch    effectiveness of feeding    manual expression    handling and storing breastmilk    maintenance of breastfeeding for the first 6 months    sign/symptoms of infant feeding issues requiring referral to qualified health care provider  Postpartum care education provided, see Patient Education activity. Patient denies needs. Will monitor.  Aurora Leggett

## 2017-11-17 VITALS
HEIGHT: 67 IN | WEIGHT: 184.97 LBS | SYSTOLIC BLOOD PRESSURE: 118 MMHG | RESPIRATION RATE: 16 BRPM | HEART RATE: 70 BPM | DIASTOLIC BLOOD PRESSURE: 50 MMHG | TEMPERATURE: 97.9 F | OXYGEN SATURATION: 96 % | BODY MASS INDEX: 29.03 KG/M2

## 2017-11-17 PROCEDURE — 25000132 ZZH RX MED GY IP 250 OP 250 PS 637: Performed by: OBSTETRICS & GYNECOLOGY

## 2017-11-17 RX ADMIN — IBUPROFEN 800 MG: 800 TABLET ORAL at 08:52

## 2017-11-17 RX ADMIN — WITCH HAZEL: 500 SOLUTION RECTAL; TOPICAL at 14:47

## 2017-11-17 RX ADMIN — ACETAMINOPHEN 325 MG: 325 TABLET, FILM COATED ORAL at 11:22

## 2017-11-17 RX ADMIN — IBUPROFEN 800 MG: 800 TABLET ORAL at 03:46

## 2017-11-17 NOTE — PLAN OF CARE
Face to face report given with opportunity to observe patient.    Report given to ASYA Min   11/17/2017  1:20 PM

## 2017-11-17 NOTE — PROGRESS NOTES
DAILY NOTE - POSTPARTUM DAY 2    SUBJECTIVE:  No complaints    Tolerating a regular diet? YES  Ambulating? YES  Voiding without difficulty? Yes  Lochia? minimal  Breastfeeding:  Yes, going well.        OBJECTIVE:  Vitals:    11/17/17 0415 11/17/17 0535 11/17/17 0625 11/17/17 0700   BP:    118/50   Pulse:    70   Resp: 14 14 16 16   Temp:    97.9  F (36.6  C)   TempSrc:    Oral   SpO2:       Weight:       Height:           Constitutional: Alert and oriented, no acute distress    Abdomen: Uterine fundus is firm, non-tender and at the level of the umbilicus    Extremeties:  neg edema, neg Elizabeth's sign    LABS:  Hemoglobin   Date Value Ref Range Status   11/16/2017 10.7 (L) 11.7 - 15.7 g/dL Final   11/14/2017 12.3 11.7 - 15.7 g/dL Final     Lab Results   Component Value Date    RUBELLAABIGG 31 09/16/2013      Lab Results   Component Value Date    ABO B 11/14/2017           Lab Results   Component Value Date    RH Pos 11/14/2017        ASSESSMENT:  Post-partum day #2  Normal spontaneous vaginal delivery  Doing well.     PLAN:   Discharge when ready

## 2017-11-17 NOTE — DISCHARGE INSTRUCTIONS
"BREASTFEEDING TIPS  1. Breastfeed every 2-4 hours. If your baby is sleepy - use breast compression, push on chin to \"start up\" baby, switch breasts, undress to diaper and wake before relatching.   Some babies \"cluster\" feed every 1 hour for a while- this is normal. Feed your baby whenever he/she is awake-  even if every hour for a while. This frequent feeding will help you make more milk and encourage your baby to sleep for longer stretches later in the evening or night.    - Position your baby close to you with pillows so he/she is facing you -belly to belly laying horizontally across your lap at the level of your breast and looking a bit \"upwards\" to your breast   -One hand holds the baby's neck behind the ears and the other hand holds your breast  -Baby's nose should start out pointing to your nipple before latching  - Hold your breast in a \"sandwich\" position by gently squeezing your breast in an oval shape and make sure your hands are not covering the areola  This \"nipple sandwich\" will make it easier for your breast to fit inside the baby's mouth-making latching more comfortable for you and baby and preventing sore nipples. Your baby should take a \"mouthful\" of breast!  - You may want to use hand expression to \"prime the pump\" and get a drip of milk out on your nipple to wake baby   (see website: newborns.[employer]/Breastfeeding/HandExpression.html)  - Swipe your nipple on baby's upper lip and wait for a BIG open mouth  - YOU bring baby to the breast (hold baby's neck with your fingers just below the ears) and bring baby's head to the breast--leading with the chin.  Try to avoid pushing your breast into baby's mouth- bring baby to you instead!  - Aim to get your baby's bottom lip LOW DOWN ON AREOLA (baby's upper lip just needs to \"clear\" the nipple) .   Your baby should latch onto the areola and NOT just the nipple. That way your baby gets more milk and you don't get sore nipples!      Useful web " sites:  Www.infantrisk.com  Www.aap.org  Www.ibreastfeeding.com  Www.[facility].mn.  Vaginal Delivery or  Birth   Discharge Instructions    Activity: Go back to your normal activities, except ***    Diet: You may eat a regular diet. Drink plenty of fluids.      Call your Doctor  if you have any of these symptoms:    * You soak a sanitary pad with blood within 1 hour, or you see clots larger than a  golf ball.    * Bleeding that lasts more than 6 weeks.     *Bad-smelling fluid that comes out of your vagina.    *A fever above 100.4 degrees Fahrenheit (38.4 degrees Celsius) with or without chills.    * Severe pain, cramping, or tenderness in your lower belly area.    * Increased pain, swelling, redness or fluid around your stitches.    * A need to urinate more frequently (use the toilet more often), more urgently (use the toilet very quickly), or it burns when you urinate.    * Redness, swelling, or pain around a vein in your leg.    * Problems coping with sadness, anxiety, or depression.    * Problems breastfeeding, or a red or painful area on your breast.    * You have questions or concerns after you return home.      [facility]: [phone]

## 2017-11-17 NOTE — PLAN OF CARE
Face to face report given with opportunity to observe patient.    Report given to Janelle Leggett   11/16/2017  7:14 PM

## 2017-11-17 NOTE — LACTATION NOTE
This note was copied from a baby's chart.  Continue to feed on demand when  elicits feeding cues with deep latch.  Will follow-up as needed to provide continued encouragement and support.   Reviewed how to use her pump.  Gave a prescription for a breast pump.  Exclusivity explained and encouraged in the early weeks to establish breastfeeding and order in milk supply.  Rooming-in encouraged with explanation of the benefits.  Encouraged Johana to call for assistance if she has trouble getting baby latched deeply.    Gabby Cooper RN, IBCLC

## 2017-11-17 NOTE — PLAN OF CARE
Pt resting comfortably in bed visiting with family, going to eat some dinner. Denies any pain. Babe rooming in and bonding well. No concerns at this time. Will continue to monitor.

## 2017-11-17 NOTE — PLAN OF CARE
Problem: Patient Care Overview  Goal: Plan of Care/Patient Progress Review  Outcome: Improving  VSS and afebrile. Assessments completed as charted. Fundus firm U/1, lochia scant with no large clots noted per pt. Perineum well-approximated and no s/s of infection noted. Mom up independently in room. Babe rooming in and bonding well. States pain 2 in perineum. Reports relief from Ibuprofen and Tylenol. Denies any needs or concerns at this time. Will continue to monitor.

## 2017-11-17 NOTE — DISCHARGE SUMMARY
"Discharge Summary    Johana Duff MRN# [id number]   YOB: 1990 Age: 27 year old     Date of Admission:  2017  Date of Discharge:  2017  Admitting Physician:  Vicky Benavidez MD  Discharge Physician:  Marilin Shaffer NP  Discharging Service:  Obstetrics and Gynecology     Primary Provider: Debby Floyd          Admission Diagnoses:   Encounter for triage in pregnant patient  Normal labor          Discharge Diagnosis:     Term intrauterine pregnancy, delivered             Discharge Disposition:   Discharged to home           Condition on Discharge:   Discharge condition: Stable   Discharge vitals: Blood pressure 118/50, pulse 70, temperature 97.9  F (36.6  C), temperature source Oral, resp. rate 16, height 1.702 m (5' 7\"), weight 83.9 kg (184 lb 15.5 oz), last menstrual period 2017, SpO2 96 %, unknown if currently breastfeeding.   Code status on discharge: Full Code           Procedures / Labs / Imaging:             Medications Prior to Admission:     Prescriptions Prior to Admission   Medication Sig Dispense Refill Last Dose     Prenatal MV-Min-Fe Fum-FA-DHA (PRENATAL 1 PO)    2017 at Unknown time             Discharge Medications:     Current Discharge Medication List      CONTINUE these medications which have NOT CHANGED    Details   Prenatal MV-Min-Fe Fum-FA-DHA (PRENATAL 1 PO)     Associated Diagnoses: Annual physical exam; Family planning                   Consultations:   No consultations were requested during this admission             Brief History of Illness:   Johana Duff is a 27 year old female who was admitted for  at term.          Hospital Course:   , girl. Breastfeeding well.   Uncomplicated postpartum course.  Discharged on ppd# 2               Significant Results:   None             Pending Results:   None           Discharge Instructions and Follow-Up:   Discharge diet: Regular   Discharge activity: Activity as tolerated.  Pelvic rest for " 6 weeks   Discharge follow-up: Follow up for postpartum exam at 2 and 6 weeks postpartum

## 2017-11-17 NOTE — PLAN OF CARE
Face to face report given with opportunity to observe patient.  Report given to JAZ Chirinos.    Janelle Garcia  11/17/2017, 6:58 AM

## 2017-11-17 NOTE — PLAN OF CARE
Problem: Patient Care Overview  Goal: Plan of Care/Patient Progress Review  Outcome: Improving  Assessments completed as charted. B/P: 118/50, T: 97.9, P: 70, R: 16. Rates pain: 0/10 denies need for meds right now . Voiding without difficulty. Fundus: Midline FFat U. Lochia: Light. Activity: normal activity. Infant feeding: Breast feeding going well.         Postpartum breastfeeding assessment completed and education provided, see Patient Education Activity.  Items included in the education are:     proper positioning and latch    effectiveness of feeding    manual expression    handling and storing breastmilk    maintenance of breastfeeding for the first 6 months    sign/symptoms of infant feeding issues requiring referral to qualified health care provider  Postpartum care education provided, see Patient Education activity. Patient denies needs. Will monitor.  Shannan Min

## 2017-11-18 NOTE — PLAN OF CARE
Patient discharged at 3:55 PM via ambulation accompanied by spouse and staff. Prescriptions sent to patients preferred pharmacy. All belongings sent with patient.     Discharge instructions reviewed with patient. Listed belongings gathered and returned to patient.    Patient discharged to home.     Core Measures and Vaccines  Core Measures applicable during stay: n/a  Pneumonia and Influenza given prior to discharge, if indicated: N/A    Surgical Patient   Surgical Procedures during stay: none  Did patient receive discharge instruction on wound care and recognition of infection symptoms? Yes    MISC  Follow up appointment made:  Yes  Home and hospital aquired medications returned to patient: N/A  Patient reports pain was well managed at discharge: Yes

## 2017-11-28 ENCOUNTER — PRENATAL OFFICE VISIT (OUTPATIENT)
Dept: OBGYN | Facility: OTHER | Age: 27
End: 2017-11-28
Attending: OBSTETRICS & GYNECOLOGY
Payer: COMMERCIAL

## 2017-11-28 VITALS
WEIGHT: 151 LBS | DIASTOLIC BLOOD PRESSURE: 66 MMHG | HEIGHT: 67 IN | HEART RATE: 76 BPM | BODY MASS INDEX: 23.7 KG/M2 | SYSTOLIC BLOOD PRESSURE: 100 MMHG

## 2017-11-28 DIAGNOSIS — Z30.09 FAMILY PLANNING: Primary | ICD-10-CM

## 2017-11-28 PROCEDURE — 99207 ZZC NO CHARGE LOS: CPT | Performed by: OBSTETRICS & GYNECOLOGY

## 2017-11-28 ASSESSMENT — ANXIETY QUESTIONNAIRES
3. WORRYING TOO MUCH ABOUT DIFFERENT THINGS: NOT AT ALL
5. BEING SO RESTLESS THAT IT IS HARD TO SIT STILL: NOT AT ALL
7. FEELING AFRAID AS IF SOMETHING AWFUL MIGHT HAPPEN: NOT AT ALL
1. FEELING NERVOUS, ANXIOUS, OR ON EDGE: NOT AT ALL
6. BECOMING EASILY ANNOYED OR IRRITABLE: NOT AT ALL
GAD7 TOTAL SCORE: 0
2. NOT BEING ABLE TO STOP OR CONTROL WORRYING: NOT AT ALL

## 2017-11-28 ASSESSMENT — PATIENT HEALTH QUESTIONNAIRE - PHQ9
5. POOR APPETITE OR OVEREATING: NOT AT ALL
SUM OF ALL RESPONSES TO PHQ QUESTIONS 1-9: 3

## 2017-11-28 NOTE — NURSING NOTE
"Chief Complaint   Patient presents with     Contraception       Initial /66  Pulse 76  Ht 5' 7\" (1.702 m)  Wt 151 lb (68.5 kg)  LMP 02/02/2017  BMI 23.65 kg/m2 Estimated body mass index is 23.65 kg/(m^2) as calculated from the following:    Height as of this encounter: 5' 7\" (1.702 m).    Weight as of this encounter: 151 lb (68.5 kg).  Medication Reconciliation: phuong Encarnacion      "

## 2017-11-28 NOTE — PROGRESS NOTES
"Johana Duff is a 27 year old female  /66  Pulse 76  Ht 5' 7\" (1.702 m)  Wt 151 lb (68.5 kg)  LMP 02/02/2017  BMI 23.65 kg/m2  Generally well  Breast feeding:  y        Baby name: Purvi   Spontaneous vaginal delivery: y    Stitches: y  PHQ9:  1  Bleeding:  Lightened up  Vulva:  kegel  Family planning:  Condoms only. Doesn't want Plan B  Other concerns:  n    Assessment:  Family planning    Plan:   Return To Office: 4 wks for exam  rto annual    Greater than 15 minutes were spent with this patient  Vicky Benavidez MD    "

## 2017-11-28 NOTE — MR AVS SNAPSHOT
"              After Visit Summary   11/28/2017    Johana Duff    MRN: [id number]           Patient Information     Date Of Birth          1990        Visit Information        Provider Department      11/28/2017 10:40 AM Vicky Benavidez MD [facility]        Today's Diagnoses     Family planning    -  1      Care Instructions    Return to clinic in 4 weeks for exam.  Return for annual.          Follow-ups after your visit        Your next 10 appointments already scheduled     Dec 27, 2017  9:40 AM CST   (Arrive by 9:25 AM)   Post Partum with Vciky Benavidez MD   [facility] [place] ([facility] )    [address]  [place] [address]   [phone]            May 07, 2018  9:30 AM CDT   (Arrive by 9:15 AM)   PHYSICAL with Vicky Benavidez MD   [facility] [place] ([facility] [place] )    [address]  [place] [address]   [phone]              Who to contact     If you have questions or need follow up information about today's clinic visit or your schedule please contact [facility] directly at [phone].  Normal or non-critical lab and imaging results will be communicated to you by MyChart, letter or phone within 4 business days after the clinic has received the results. If you do not hear from us within 7 days, please contact the clinic through MyChart or phone. If you have a critical or abnormal lab result, we will notify you by phone as soon as possible.  Submit refill requests through [employer] or call your pharmacy and they will forward the refill request to us. Please allow 3 business days for your refill to be completed.          Additional Information About Your Visit        MyChart Information     [employer] lets you send messages to your doctor, view your test results, renew your prescriptions, schedule appointments and more. To sign up, go to [email or web address] . Click on \"Log in\" on the left side of the " "screen, which will take you to the Welcome page. Then click on \"Sign up Now\" on the right side of the page.     You will be asked to enter the access code listed below, as well as some personal information. Please follow the directions to create your username and password.     Your access code is: [email or web address]  Expires: 2018  9:53 AM     Your access code will  in 90 days. If you need help or a new code, please call your [place] clinic or [phone].        Care EveryWhere ID     This is your Care EveryWhere ID. This could be used by other organizations to access your [place] medical records  [id number]        Your Vitals Were     Pulse Height Last Period Breastfeeding? BMI (Body Mass Index)       76 5' 7\" (1.702 m) 2017 Yes 23.65 kg/m2        Blood Pressure from Last 3 Encounters:   17 100/66   17 118/50   17 122/72    Weight from Last 3 Encounters:   17 151 lb (68.5 kg)   17 184 lb 15.5 oz (83.9 kg)   17 183 lb (83 kg)              Today, you had the following     No orders found for display       Primary Care Provider Office Phone # Fax #    Debby Floyd [phone]       [facility] [place] [address] [facility] [place] [address]        Equal Access to Services     [facility] AH: Hadii aad ku hadasho Soogali, waaxda luqadaha, qaybta kaalmada adeegyada, suellen finch . So [facility] [phone].    ATENCIÓN: Si habla español, tiene a thomas disposición servicios gratuitos de asistencia lingüística. Llame al [phone].    We comply with applicable federal civil rights laws and [place] laws. We do not discriminate on the basis of race, color, national origin, age, disability, sex, sexual orientation, or gender identity.            Thank you!     Thank you for choosing [facility] [place]  for your care. Our goal is always to provide you with excellent care. Hearing back from our patients is one way we can " continue to improve our services. Please take a few minutes to complete the written survey that you may receive in the mail after your visit with us. Thank you!             Your Updated Medication List - Protect others around you: Learn how to safely use, store and throw away your medicines at www.disposemymeds.org.          This list is accurate as of: 11/28/17 12:06 PM.  Always use your most recent med list.                   Brand Name Dispense Instructions for use Diagnosis    PRENATAL 1 PO       Annual physical exam, Family planning       VITAMIN C PO      Take 1,000 mg by mouth daily

## 2017-11-29 ASSESSMENT — ANXIETY QUESTIONNAIRES: GAD7 TOTAL SCORE: 0

## 2017-12-27 ENCOUNTER — PRENATAL OFFICE VISIT (OUTPATIENT)
Dept: OBGYN | Facility: OTHER | Age: 27
End: 2017-12-27
Attending: OBSTETRICS & GYNECOLOGY
Payer: COMMERCIAL

## 2017-12-27 VITALS
WEIGHT: 154 LBS | HEIGHT: 67 IN | DIASTOLIC BLOOD PRESSURE: 68 MMHG | SYSTOLIC BLOOD PRESSURE: 108 MMHG | BODY MASS INDEX: 24.17 KG/M2

## 2017-12-27 PROCEDURE — 99207 ZZC POST PARTUM EXAM: CPT | Performed by: OBSTETRICS & GYNECOLOGY

## 2017-12-27 ASSESSMENT — PATIENT HEALTH QUESTIONNAIRE - PHQ9
5. POOR APPETITE OR OVEREATING: NOT AT ALL
SUM OF ALL RESPONSES TO PHQ QUESTIONS 1-9: 1

## 2017-12-27 ASSESSMENT — ANXIETY QUESTIONNAIRES
IF YOU CHECKED OFF ANY PROBLEMS ON THIS QUESTIONNAIRE, HOW DIFFICULT HAVE THESE PROBLEMS MADE IT FOR YOU TO DO YOUR WORK, TAKE CARE OF THINGS AT HOME, OR GET ALONG WITH OTHER PEOPLE: NOT DIFFICULT AT ALL
1. FEELING NERVOUS, ANXIOUS, OR ON EDGE: SEVERAL DAYS
GAD7 TOTAL SCORE: 1
5. BEING SO RESTLESS THAT IT IS HARD TO SIT STILL: NOT AT ALL
7. FEELING AFRAID AS IF SOMETHING AWFUL MIGHT HAPPEN: NOT AT ALL
3. WORRYING TOO MUCH ABOUT DIFFERENT THINGS: NOT AT ALL
6. BECOMING EASILY ANNOYED OR IRRITABLE: NOT AT ALL
2. NOT BEING ABLE TO STOP OR CONTROL WORRYING: NOT AT ALL

## 2017-12-27 ASSESSMENT — PAIN SCALES - GENERAL: PAINLEVEL: NO PAIN (0)

## 2017-12-27 NOTE — MR AVS SNAPSHOT
"              After Visit Summary   12/27/2017    Johana Duff    MRN: [id number]           Patient Information     Date Of Birth          1990        Visit Information        Provider Department      12/27/2017 9:40 AM Vicky Benavidez MD [facility] [place]        Today's Diagnoses     Routine postpartum follow-up    -  1      Care Instructions    Return for annual exam.            Follow-ups after your visit        Your next 10 appointments already scheduled     May 07, 2018  9:30 AM CDT   (Arrive by 9:15 AM)   PHYSICAL with Vicky Benavidez MD   [facility] [place] ([facility] )    [address] [place] [address]   [phone]              Who to contact     If you have questions or need follow up information about today's clinic visit or your schedule please contact [facility] at [phone].  Normal or non-critical lab and imaging results will be communicated to you by MyChart, letter or phone within 4 business days after the clinic has received the results. If you do not hear from us within 7 days, please contact the clinic through MyChart or phone. If you have a critical or abnormal lab result, we will notify you by phone as soon as possible.  Submit refill requests through [employer] or call your pharmacy and they will forward the refill request to us. Please allow 3 business days for your refill to be completed.          Additional Information About Your Visit        MyChart Information     [employer] lets you send messages to your doctor, view your test results, renew your prescriptions, schedule appointments and more. To sign up, go to [email or web address] . Click on \"Log in\" on the left side of the screen, which will take you to the Welcome page. Then click on \"Sign up Now\" on the right side of the page.     You will be asked to enter the access code listed below, as well as some personal information. Please follow the directions to " "create your username and password.     Your access code is: [email or web address]  Expires: 2018  9:53 AM     Your access code will  in 90 days. If you need help or a new code, please call your [place] clinic or [phone].        Care EveryWhere ID     This is your Care EveryWhere ID. This could be used by other organizations to access your [place] medical records  [id number]        Your Vitals Were     Height Last Period BMI (Body Mass Index)             5' 7\" (1.702 m) 2017 24.12 kg/m2          Blood Pressure from Last 3 Encounters:   17 108/68   17 100/66   17 118/50    Weight from Last 3 Encounters:   17 154 lb (69.9 kg)   17 151 lb (68.5 kg)   17 184 lb 15.5 oz (83.9 kg)              Today, you had the following     No orders found for display       Primary Care Provider Office Phone # Fax #    Debby Floyd [phone]       [facility] [place] [address] [place] [address]        Equal Access to Services     [facility] AH: Hadii aad ku hadasho Soomaali, waaxda luqadaha, qaybta kaalmada adeegyada, suellen bowden haymarilynn brandy finch . So [facility] [phone].    ATENCIÓN: Si habla español, tiene a thomas disposición servicios gratuitos de asistencia lingüística. Llame al [phone].    We comply with applicable federal civil rights laws and [place] laws. We do not discriminate on the basis of race, color, national origin, age, disability, sex, sexual orientation, or gender identity.            Thank you!     [facility]. Our goal is always to provide you with excellent care. Hearing back from our patients is one way we can continue to improve our services. Please take a few minutes to complete the written survey that you may receive in the mail after your visit with us. Thank you!             Your Updated Medication List - Protect others around you: Learn how to safely use, store " and throw away your medicines at www.disposemymeds.org.          This list is accurate as of: 12/27/17 10:22 AM.  Always use your most recent med list.                   Brand Name Dispense Instructions for use Diagnosis    PRENATAL 1 PO       Annual physical exam, Family planning       VITAMIN C PO      Take 1,000 mg by mouth daily        VITAMIN D (CHOLECALCIFEROL) PO      Take 3,000 Units by mouth daily

## 2017-12-27 NOTE — NURSING NOTE
"Chief Complaint   Patient presents with     Post Partum Exam     6 Week PP       Initial /68 (Cuff Size: Adult Regular)  Ht 5' 7\" (1.702 m)  Wt 154 lb (69.9 kg)  LMP 02/02/2017  BMI 24.12 kg/m2 Estimated body mass index is 24.12 kg/(m^2) as calculated from the following:    Height as of this encounter: 5' 7\" (1.702 m).    Weight as of this encounter: 154 lb (69.9 kg).  Medication Reconciliation: complete   Parvin Win      "

## 2017-12-28 ASSESSMENT — ANXIETY QUESTIONNAIRES: GAD7 TOTAL SCORE: 1

## 2018-05-07 ENCOUNTER — OFFICE VISIT (OUTPATIENT)
Dept: OBGYN | Facility: OTHER | Age: 28
End: 2018-05-07
Attending: OBSTETRICS & GYNECOLOGY
Payer: COMMERCIAL

## 2018-05-07 VITALS
HEART RATE: 80 BPM | WEIGHT: 138 LBS | DIASTOLIC BLOOD PRESSURE: 60 MMHG | BODY MASS INDEX: 21.66 KG/M2 | HEIGHT: 67 IN | SYSTOLIC BLOOD PRESSURE: 100 MMHG

## 2018-05-07 DIAGNOSIS — Z00.00 ROUTINE GENERAL MEDICAL EXAMINATION AT A HEALTH CARE FACILITY: Primary | ICD-10-CM

## 2018-05-07 PROCEDURE — 99395 PREV VISIT EST AGE 18-39: CPT | Performed by: OBSTETRICS & GYNECOLOGY

## 2018-05-07 PROCEDURE — 87624 HPV HI-RISK TYP POOLED RSLT: CPT | Mod: 90 | Performed by: OBSTETRICS & GYNECOLOGY

## 2018-05-07 PROCEDURE — 99000 SPECIMEN HANDLING OFFICE-LAB: CPT | Performed by: OBSTETRICS & GYNECOLOGY

## 2018-05-07 PROCEDURE — 88142 CYTOPATH C/V THIN LAYER: CPT | Performed by: OBSTETRICS & GYNECOLOGY

## 2018-05-07 ASSESSMENT — ANXIETY QUESTIONNAIRES
2. NOT BEING ABLE TO STOP OR CONTROL WORRYING: NOT AT ALL
GAD7 TOTAL SCORE: 0
5. BEING SO RESTLESS THAT IT IS HARD TO SIT STILL: NOT AT ALL
7. FEELING AFRAID AS IF SOMETHING AWFUL MIGHT HAPPEN: NOT AT ALL
3. WORRYING TOO MUCH ABOUT DIFFERENT THINGS: NOT AT ALL
1. FEELING NERVOUS, ANXIOUS, OR ON EDGE: NOT AT ALL
6. BECOMING EASILY ANNOYED OR IRRITABLE: NOT AT ALL

## 2018-05-07 ASSESSMENT — PATIENT HEALTH QUESTIONNAIRE - PHQ9: 5. POOR APPETITE OR OVEREATING: NOT AT ALL

## 2018-05-07 NOTE — NURSING NOTE
"Chief Complaint   Patient presents with     Gyn Exam       Initial /60  Pulse 80  Ht 5' 7\" (1.702 m)  Wt 138 lb (62.6 kg)  BMI 21.61 kg/m2 Estimated body mass index is 21.61 kg/(m^2) as calculated from the following:    Height as of this encounter: 5' 7\" (1.702 m).    Weight as of this encounter: 138 lb (62.6 kg).  Medication Reconciliation: complete   Anne Marie Encarnacion LPN    "

## 2018-05-07 NOTE — PROGRESS NOTES
ANNUAL    Johana is a 27 year old  female who presents for annual exam.   Youngest Child: 6 mo  Largest Child: 9#3  Gestational Diabetes: n Hypertension: n  No LMP recorded. Patient is not currently having periods (Reason: Breast Feeding).  Menses: na pain naContraception na.  Planning pregnancy: in 6 months  Concerns in addition to routine health maintenance?  Condoms folate discussed.  GYNECOLOGIC HISTORY:   Surgery: n  History sexually transmitted infections: No STD history  Safe?  y  STI testing offered?  y  History of abnormal Pap smear: n  Problems with sex? n  Family history of: breast cancer: n   Uterine cancer n ovarian cancer: n   colon cancer: n    HEALTH MAINTENANCE:  Cholesterol: (No results found for: CHOL History of abnormal lipids: n  Mammo: n . History of abnormal Mammo:n   Regular Self Breast Exams: y Calcium/Vitamin D or Vitamin: y Exercise y    TSH: (  TSH   Date Value Ref Range Status   10/10/2017 1.30 0.40 - 4.00 mU/L Final    )  Pap; (  Lab Results   Component Value Date    PAP NIL 07/15/2015    PAP NIL 2013    )    HISTORY:  Obstetric History       T1      L0     SAB0   TAB0   Ectopic0   Multiple0   Live Births0       # Outcome Date GA Lbr Kamlesh/2nd Weight Sex Delivery Anes PTL Lv   2 Term 11/15/17 40w6d 07:28 / 02:30 9 lb 3.4 oz (4.18 kg) F Vag-Spont Local,TOPICAL N       Name: Caridad      Apgar1:  8                Apgar5: 9   1 AB 16 7w0d    AB, INEVITAB           Past Medical History:   Diagnosis Date     Ovarian cyst 2002    left     Past Surgical History:   Procedure Laterality Date     wisdom teeth extraction       Family History   Problem Relation Age of Onset     CEREBROVASCULAR DISEASE Maternal Grandmother      complications     Hypertension Maternal Grandmother      Family History Negative Mother      Coronary Artery Disease Maternal Grandfather      s/p stents     Lung Cancer Maternal Grandfather      +tobacco     Family History Negative Father       lymes     Family History Negative Sister      Emphysema Paternal Grandmother      +tobacco     Family History Negative Sister      Family History Negative Sister      Emphysema Paternal Grandfather      +tobacco     Social History     Social History     Marital status:      Spouse name: Hank     Number of children: 0     Years of education: 15     Occupational History     sect/muscic/clean Other     [employer]     Social History Main Topics     Smoking status: Never Smoker     Smokeless tobacco: Never Used     Alcohol use Yes      Comment: 3/year     Drug use: No     Sexual activity: Yes     Partners: Male     Birth control/ protection: Condom     Other Topics Concern     Parent/Sibling W/ Cabg, Mi Or Angioplasty Before 65f 55m? No     Social History Narrative     -- n/a    Caffeine -- 1c/day    Exercise -- walking       Current Outpatient Prescriptions:      Ascorbic Acid (VITAMIN C PO), Take 1,000 mg by mouth daily, Disp: , Rfl:      Prenatal MV-Min-Fe Fum-FA-DHA (PRENATAL 1 PO), , Disp: , Rfl:      VITAMIN D, CHOLECALCIFEROL, PO, Take 3,000 Units by mouth daily, Disp: , Rfl:      Allergies   Allergen Reactions     Blue Dyes      Red Dye        Past medical, surgical, social and family history were reviewed and updated in EPIC    ROS:    CONSTITUTIONAL:     NEGATIVE for fever, chills, change in weight  INTEGUMENTARY/SKIN:       NEGATIVE for worrisome rashes, moles or lesions  EYES:     NEGATIVE for vision changes or irritation  ENT/MOUTH: NEGATIVE for ear, mouth and throat problems  RESP:     NEGATIVE for significant cough or SOB  CV:   NEGATIVE for chest pain, palpitations or peripheral edema  GI:     NEGATIVE for nausea, abdominal pain, heartburn, or change in bowel habits  :   NEGATIVE for frequency, dysuria, hematuria, vaginal discharge, or irregular bleeding,incontinence   MUSCULOSKELETAL:     NEGATIVE for significant arthralgias or myalgia  NEURO:      NEGATIVE for weakness,  "dizziness or paresthesias  ENDOCRINE:      NEGATIVE for temperature intolerance, skin/hair changes  PSYCHIATRIC:      NEGATIVE for changes in mood or affect.     EXAM:   /60  Pulse 80  Ht 5' 7\" (1.702 m)  Wt 138 lb (62.6 kg)  BMI 21.61 kg/m2   BMI: Body mass index is 21.61 kg/(m^2).  Constitutional: healthy, alert and no distress  Head: Normocephalic. No masses, lesions, tenderness or abnormalities  Neck: Neck supple. Trachea midline. No adenopathy. Thyroid symmetric, normal size.   Cardiovascular: RRR.   Respiratory: lungs clear   Breast: Breasts reveal mild symmetric fibrocystic densities, but there are no dominant, discrete, fixed or suspicious masses found.  Gastrointestinal: Abdomen soft, non-tender, non-distended. No masses, organomegaly.  :  Vulva:  No external lesions, normal female hair distribution, no inguinal adenopathy.    Urethra:  Midline, non-tender, well supported, no discharge  Vagina:  Moist, pink, no abnormal discharge, no lesions  Cervix: clean   Uterus:   Normal size,  , non-tender, freely mobile  Ovaries:  No masses appreciated  Rectal Exam: not done  Musculoskeletal: extremities normal  Skin: no suspicious lesions or rashes  Psychiatric: Affect appropriate, cooperative,mentation appears normal.     COUNSELING:   regular exercise  healthy diet/nutrition  Immunizations   contraception  family planning  Folic Acid Counseling     reports that she has never smoked. She has never used smokeless tobacco.  Tobacco Cessation Action Plan: na  Body mass index is 21.61 kg/(m^2).  Weight management plan: na  FRAX Risk Assessment    ASSESSMENT:  27 year old female with satisfactory annual exam    PLAN:   Pap with High Risk hpv  Check MIIC  Return to office: 1 year RE    Greater than 0 minutes spent discussing other than annual     Vicky Benavidez MD    "

## 2018-05-07 NOTE — MR AVS SNAPSHOT
"              After Visit Summary   2018    Johana Duff    MRN: [id number]           Patient Information     Date Of Birth          1990        Visit Information        Provider Department      2018 9:30 AM Vicky Benavidez MD [facility] [place]        Today's Diagnoses     Routine general medical examination at a health care facility    -  1      Care Instructions    Pap test done today.  Return for annual exam in 1 year with Bob Riggs.           Follow-ups after your visit        Who to contact     If you have questions or need follow up information about today's clinic visit or your schedule please contact [facility] directly at [phone].  Normal or non-critical lab and imaging results will be communicated to you by MyChart, letter or phone within 4 business days after the clinic has received the results. If you do not hear from us within 7 days, please contact the clinic through MyChart or phone. If you have a critical or abnormal lab result, we will notify you by phone as soon as possible.  Submit refill requests through [employer] or call your pharmacy and they will forward the refill request to us. Please allow 3 business days for your refill to be completed.          Additional Information About Your Visit        MyChart Information     [employer] lets you send messages to your doctor, view your test results, renew your prescriptions, schedule appointments and more. To sign up, go to [email or web address] . Click on \"Log in\" on the left side of the screen, which will take you to the Welcome page. Then click on \"Sign up Now\" on the right side of the page.     You will be asked to enter the access code listed below, as well as some personal information. Please follow the directions to create your username and password.     Your access code is: [email or web address]  Expires: 2018 10:28 AM     Your access code will  in 90 days. If you need help or a new code, please " "call your [place] clinic or [phone].        Care EveryWhere ID     This is your Care EveryWhere ID. This could be used by other organizations to access your [place] medical records  [id number]        Your Vitals Were     Pulse Height BMI (Body Mass Index)             80 5' 7\" (1.702 m) 21.61 kg/m2          Blood Pressure from Last 3 Encounters:   05/07/18 100/60   12/27/17 108/68   11/28/17 100/66    Weight from Last 3 Encounters:   05/07/18 138 lb (62.6 kg)   12/27/17 154 lb (69.9 kg)   11/28/17 151 lb (68.5 kg)              We Performed the Following     A pap thin layer screen with  HPV - recommended age 30 - 65 years (select HPV order below)     HPV High Risk Types DNA Cervical        Primary Care Provider Office Phone # Fax #    Debby Floyd [phone]       [facility] [place] [facility] [place] [address]  [place] [address]        Equal Access to Services     JOHN GARCIA : Hadii aad ku hadasho Soomaali, waaxda luqadaha, qaybta kaalmada adeegyada, waxay idiin haymarilynn brandy finch . So [facility] [phone].    ATENCIÓN: Si habla español, tiene a thomas disposición servicios gratuitos de asistencia lingüística. Llame al [phone].    We comply with applicable federal civil rights laws and [place] laws. We do not discriminate on the basis of race, color, national origin, age, disability, sex, sexual orientation, or gender identity.            Thank you!     Thank you for choosing [facility] [place]  for your care. Our goal is always to provide you with excellent care. Hearing back from our patients is one way we can continue to improve our services. Please take a few minutes to complete the written survey that you may receive in the mail after your visit with us. Thank you!             Your Updated Medication List - Protect others around you: Learn how to safely use, store and throw away your medicines at www.disposemymeds.org.          This list is accurate as of 5/7/18 10:28 " AM.  Always use your most recent med list.                   Brand Name Dispense Instructions for use Diagnosis    PRENATAL 1 PO       Annual physical exam, Family planning       VITAMIN C PO      Take 1,000 mg by mouth daily        VITAMIN D (CHOLECALCIFEROL) PO      Take 3,000 Units by mouth daily

## 2018-05-08 ASSESSMENT — ANXIETY QUESTIONNAIRES: GAD7 TOTAL SCORE: 0

## 2018-05-08 ASSESSMENT — PATIENT HEALTH QUESTIONNAIRE - PHQ9: SUM OF ALL RESPONSES TO PHQ QUESTIONS 1-9: 0

## 2018-05-14 LAB
COPATH REPORT: NORMAL
PAP: NORMAL

## 2018-05-16 LAB
FINAL DIAGNOSIS: NORMAL
HPV HR 12 DNA CVX QL NAA+PROBE: NEGATIVE
HPV16 DNA SPEC QL NAA+PROBE: NEGATIVE
HPV18 DNA SPEC QL NAA+PROBE: NEGATIVE
SPECIMEN DESCRIPTION: NORMAL
SPECIMEN SOURCE CVX/VAG CYTO: NORMAL

## 2018-05-21 ENCOUNTER — OFFICE VISIT (OUTPATIENT)
Dept: OBGYN | Facility: OTHER | Age: 28
End: 2018-05-21
Attending: OBSTETRICS & GYNECOLOGY
Payer: COMMERCIAL

## 2018-05-21 VITALS
WEIGHT: 138 LBS | HEIGHT: 67 IN | DIASTOLIC BLOOD PRESSURE: 64 MMHG | SYSTOLIC BLOOD PRESSURE: 108 MMHG | BODY MASS INDEX: 21.66 KG/M2 | HEART RATE: 64 BPM

## 2018-05-21 DIAGNOSIS — R87.615 ENCOUNTER FOR REPEAT PAP SMEAR DUE TO PREVIOUS INSUFFICIENT CERVICAL CELLS: Primary | ICD-10-CM

## 2018-05-21 DIAGNOSIS — R87.615 INSUFFICIENT ENDOCERVICAL OR TRANSFORMATION ZONE COMPONENT IN CERVICAL SPECIMEN: ICD-10-CM

## 2018-05-21 PROCEDURE — 88142 CYTOPATH C/V THIN LAYER: CPT | Performed by: OBSTETRICS & GYNECOLOGY

## 2018-05-21 PROCEDURE — 87624 HPV HI-RISK TYP POOLED RSLT: CPT | Mod: 90 | Performed by: OBSTETRICS & GYNECOLOGY

## 2018-05-21 PROCEDURE — 99000 SPECIMEN HANDLING OFFICE-LAB: CPT | Performed by: OBSTETRICS & GYNECOLOGY

## 2018-05-21 PROCEDURE — 99212 OFFICE O/P EST SF 10 MIN: CPT | Performed by: OBSTETRICS & GYNECOLOGY

## 2018-05-21 NOTE — MR AVS SNAPSHOT
"              After Visit Summary   5/21/2018    Johana Duff    MRN: [id number]           Patient Information     Date Of Birth          1990        Visit Information        Provider Department      5/21/2018 2:20 PM Vicky Benavidez MD [facility]        Today's Diagnoses     Encounter for repeat Pap smear due to previous insufficient cervical cells    -  1    Insufficient endocervical or transformation zone component in cervical specimen           Follow-ups after your visit        Your next 10 appointments already scheduled     May 13, 2019 10:30 AM CDT   (Arrive by 10:15 AM)   PHYSICAL with KATIUSKA Mcclellan CNM   [facility] ([facility] )    [address]  [facility] [address]   [phone]              Who to contact     If you have questions or need follow up information about today's clinic visit or your schedule please contact [facility] directly at [phone].  Normal or non-critical lab and imaging results will be communicated to you by MyChart, letter or phone within 4 business days after the clinic has received the results. If you do not hear from us within 7 days, please contact the clinic through MyChart or phone. If you have a critical or abnormal lab result, we will notify you by phone as soon as possible.  Submit refill requests through [employer] or call your pharmacy and they will forward the refill request to us. Please allow 3 business days for your refill to be completed.          Additional Information About Your Visit        [employer] Information     [employer] lets you send messages to your doctor, view your test results, renew your prescriptions, schedule appointments and more. To sign up, go to [email or web address] . Click on \"Log in\" on the left side of the screen, which will take you to the Welcome page. Then click on \"Sign up Now\" on the right side of the page.     You will be asked to enter the access code " "listed below, as well as some personal information. Please follow the directions to create your username and password.     Your access code is: [email or web address]  Expires: 2018 10:28 AM     Your access code will  in 90 days. If you need help or a new code, please call your [place] clinic or [phone].        Care EveryWhere ID     This is your Care EveryWhere ID. This could be used by other organizations to access your [place] medical records  [id number]        Your Vitals Were     Pulse Height BMI (Body Mass Index)             64 5' 7\" (1.702 m) 21.61 kg/m2          Blood Pressure from Last 3 Encounters:   18 108/64   18 100/60   17 108/68    Weight from Last 3 Encounters:   18 138 lb (62.6 kg)   18 138 lb (62.6 kg)   17 154 lb (69.9 kg)              Today, you had the following     No orders found for display       Primary Care Provider Office Phone # Fax #    Debby Floyd [phone]       [facility] [place] [address]  [place] [address]        Equal Access to Services     ASAEL GARCIA AH: Hadii aad ku hadasho Soomaali, waaxda luqadaha, qaybta kaalmada adeegyada, waxay aurein haymarilynn brandy bentley lajuliocesar . So [facility] [phone].    ATENCIÓN: Si habla español, tiene a thomas disposición servicios gratuitos de asistencia lingüística. Llame al [phone].    We comply with applicable federal civil rights laws and [place] laws. We do not discriminate on the basis of race, color, national origin, age, disability, sex, sexual orientation, or gender identity.            Thank you!     Thank you for choosing [facility] [place]  for your care. Our goal is always to provide you with excellent care. Hearing back from our patients is one way we can continue to improve our services. Please take a few minutes to complete the written survey that you may receive in the mail after your visit with us. Thank you!             Your Updated Medication " List - Protect others around you: Learn how to safely use, store and throw away your medicines at www.disposemymeds.org.          This list is accurate as of 5/21/18  3:08 PM.  Always use your most recent med list.                   Brand Name Dispense Instructions for use Diagnosis    PRENATAL 1 PO       Annual physical exam, Family planning       VITAMIN C PO      Take 1,000 mg by mouth daily        VITAMIN D (CHOLECALCIFEROL) PO      Take 3,000 Units by mouth daily

## 2018-05-21 NOTE — PROGRESS NOTES
"Johana Duff is a 27 year old female here for repeat pap due to LTO. Discussed      O:   /64  Pulse 64  Ht 5' 7\" (1.702 m)  Wt 138 lb (62.6 kg)  BMI 21.61 kg/m2   aa  Cervix clean    A:  LTO pap    P:  Pap done with cytobrush    Greater than 10 minutes were spent face to face counseling this patient    Vicky Benavidez MD    "

## 2018-05-25 LAB
COPATH REPORT: NORMAL
PAP: NORMAL

## 2018-09-17 DIAGNOSIS — Z32.01 PREGNANCY TEST POSITIVE: Primary | ICD-10-CM

## 2018-09-18 ENCOUNTER — HOSPITAL ENCOUNTER (OUTPATIENT)
Dept: ULTRASOUND IMAGING | Facility: HOSPITAL | Age: 28
Discharge: HOME | End: 2018-09-18
Attending: ADVANCED PRACTICE MIDWIFE | Admitting: ADVANCED PRACTICE MIDWIFE
Payer: COMMERCIAL

## 2018-09-18 DIAGNOSIS — Z32.01 PREGNANCY TEST POSITIVE: ICD-10-CM

## 2018-09-18 PROCEDURE — 76801 OB US < 14 WKS SINGLE FETUS: CPT | Mod: TC

## 2018-10-01 ENCOUNTER — PRENATAL OFFICE VISIT (OUTPATIENT)
Dept: OBGYN | Facility: OTHER | Age: 28
End: 2018-10-01
Attending: FAMILY MEDICINE
Payer: COMMERCIAL

## 2018-10-01 VITALS
BODY MASS INDEX: 22.29 KG/M2 | HEIGHT: 67 IN | SYSTOLIC BLOOD PRESSURE: 105 MMHG | DIASTOLIC BLOOD PRESSURE: 62 MMHG | WEIGHT: 142 LBS

## 2018-10-01 DIAGNOSIS — Z34.82 ENCOUNTER FOR SUPERVISION OF OTHER NORMAL PREGNANCY, SECOND TRIMESTER: Primary | ICD-10-CM

## 2018-10-01 LAB
ABO + RH BLD: NORMAL
ABO + RH BLD: NORMAL
ALBUMIN UR-MCNC: NEGATIVE MG/DL
AMPHETAMINES UR QL SCN: NEGATIVE
APPEARANCE UR: CLEAR
BACTERIA #/AREA URNS HPF: ABNORMAL /HPF
BARBITURATES UR QL: NEGATIVE
BENZODIAZ UR QL: NEGATIVE
BILIRUB UR QL STRIP: NEGATIVE
BLD GP AB SCN SERPL QL: NORMAL
BLOOD BANK CMNT PATIENT-IMP: NORMAL
CANNABINOIDS UR QL SCN: NEGATIVE
COCAINE UR QL: NEGATIVE
COLOR UR AUTO: ABNORMAL
ERYTHROCYTE [DISTWIDTH] IN BLOOD BY AUTOMATED COUNT: 12.1 % (ref 10–15)
GLUCOSE UR STRIP-MCNC: NEGATIVE MG/DL
HCT VFR BLD AUTO: 40.2 % (ref 35–47)
HGB BLD-MCNC: 14 G/DL (ref 11.7–15.7)
HGB UR QL STRIP: NEGATIVE
KETONES UR STRIP-MCNC: NEGATIVE MG/DL
LEUKOCYTE ESTERASE UR QL STRIP: NEGATIVE
MCH RBC QN AUTO: 30.9 PG (ref 26.5–33)
MCHC RBC AUTO-ENTMCNC: 34.8 G/DL (ref 31.5–36.5)
MCV RBC AUTO: 89 FL (ref 78–100)
METHADONE UR QL SCN: NEGATIVE
MUCOUS THREADS #/AREA URNS LPF: PRESENT /LPF
NITRATE UR QL: NEGATIVE
OPIATES UR QL SCN: NEGATIVE
PCP UR QL SCN: NEGATIVE
PH UR STRIP: 6.5 PH (ref 4.7–8)
PLATELET # BLD AUTO: 217 10E9/L (ref 150–450)
RBC # BLD AUTO: 4.53 10E12/L (ref 3.8–5.2)
RBC #/AREA URNS AUTO: 0 /HPF (ref 0–2)
SOURCE: ABNORMAL
SP GR UR STRIP: 1.01 (ref 1–1.03)
SPECIMEN EXP DATE BLD: NORMAL
SPECIMEN SOURCE: NORMAL
UROBILINOGEN UR STRIP-MCNC: NORMAL MG/DL (ref 0–2)
WBC # BLD AUTO: 8.1 10E9/L (ref 4–11)
WBC #/AREA URNS AUTO: <1 /HPF (ref 0–5)
WET PREP SPEC: NORMAL

## 2018-10-01 PROCEDURE — 36415 COLL VENOUS BLD VENIPUNCTURE: CPT | Performed by: ADVANCED PRACTICE MIDWIFE

## 2018-10-01 PROCEDURE — 99207 ZZC FIRST OB VISIT: CPT | Performed by: ADVANCED PRACTICE MIDWIFE

## 2018-10-01 PROCEDURE — 86762 RUBELLA ANTIBODY: CPT | Mod: 90 | Performed by: ADVANCED PRACTICE MIDWIFE

## 2018-10-01 PROCEDURE — 87210 SMEAR WET MOUNT SALINE/INK: CPT | Performed by: ADVANCED PRACTICE MIDWIFE

## 2018-10-01 PROCEDURE — 86780 TREPONEMA PALLIDUM: CPT | Mod: 90 | Performed by: ADVANCED PRACTICE MIDWIFE

## 2018-10-01 PROCEDURE — 86850 RBC ANTIBODY SCREEN: CPT | Performed by: ADVANCED PRACTICE MIDWIFE

## 2018-10-01 PROCEDURE — 87340 HEPATITIS B SURFACE AG IA: CPT | Mod: 90 | Performed by: ADVANCED PRACTICE MIDWIFE

## 2018-10-01 PROCEDURE — 85027 COMPLETE CBC AUTOMATED: CPT | Performed by: ADVANCED PRACTICE MIDWIFE

## 2018-10-01 PROCEDURE — 86900 BLOOD TYPING SEROLOGIC ABO: CPT | Performed by: ADVANCED PRACTICE MIDWIFE

## 2018-10-01 PROCEDURE — 87389 HIV-1 AG W/HIV-1&-2 AB AG IA: CPT | Mod: 90 | Performed by: ADVANCED PRACTICE MIDWIFE

## 2018-10-01 PROCEDURE — 86901 BLOOD TYPING SEROLOGIC RH(D): CPT | Performed by: ADVANCED PRACTICE MIDWIFE

## 2018-10-01 PROCEDURE — 87591 N.GONORRHOEAE DNA AMP PROB: CPT | Performed by: ADVANCED PRACTICE MIDWIFE

## 2018-10-01 PROCEDURE — 81001 URINALYSIS AUTO W/SCOPE: CPT | Mod: 59 | Performed by: ADVANCED PRACTICE MIDWIFE

## 2018-10-01 PROCEDURE — 80307 DRUG TEST PRSMV CHEM ANLYZR: CPT | Performed by: ADVANCED PRACTICE MIDWIFE

## 2018-10-01 PROCEDURE — 87491 CHLMYD TRACH DNA AMP PROBE: CPT | Performed by: ADVANCED PRACTICE MIDWIFE

## 2018-10-01 PROCEDURE — 99000 SPECIMEN HANDLING OFFICE-LAB: CPT | Performed by: ADVANCED PRACTICE MIDWIFE

## 2018-10-01 ASSESSMENT — ANXIETY QUESTIONNAIRES
7. FEELING AFRAID AS IF SOMETHING AWFUL MIGHT HAPPEN: NOT AT ALL
6. BECOMING EASILY ANNOYED OR IRRITABLE: NOT AT ALL
1. FEELING NERVOUS, ANXIOUS, OR ON EDGE: NOT AT ALL
GAD7 TOTAL SCORE: 0
4. TROUBLE RELAXING: NOT AT ALL
3. WORRYING TOO MUCH ABOUT DIFFERENT THINGS: NOT AT ALL
2. NOT BEING ABLE TO STOP OR CONTROL WORRYING: NOT AT ALL
5. BEING SO RESTLESS THAT IT IS HARD TO SIT STILL: NOT AT ALL

## 2018-10-01 ASSESSMENT — PAIN SCALES - GENERAL: PAINLEVEL: NO PAIN (0)

## 2018-10-01 NOTE — PATIENT INSTRUCTIONS
Return to office in 4 weeks for prenatal care and as needed.    Thank you for allowing Bob HARP CNM and our OB team to participate in your care.  If you have a scheduling or an appointment question please contact [facility] Unit Coordinator at their direct line [phone].   ALL nursing questions or concerns can be directed to your OB nurse at: [phone] Erin Nagy/Anne Marie

## 2018-10-01 NOTE — PROGRESS NOTES
NEW OB VISIT  Johana Duff is a 27 year old  at 13w4d presenting for a new ob visit.      Currently taking Prenatal Vitamins? y  Folate y    ZIKA n    MEDICAL HISTORY:  Diabetes: No  Hypertension: No  Heart Disease: No  Autoimmune disorder: No  Kidney Disease/UTI: No  Neurologic Disease/Epilepsy:No  Psychiatric Disease: No  Depression/Postpartum Depression:No  Varicositites/Phlebitis: No  Hepatitis/Liver Disease: No  Thyroid Dysfunction: No  Trauma/Violence: No  History of Blood Transfusion: No  Tobacco Use: No  Alcohol Use: No  Illicit/Recreational Drugs: No  D (Rh Sensitized): unsure  Pulmonary Disease (TB/Asthma): No  Drug/Latex Allergies/Reactions: Food dyes, no to ltex  Breast: No  GYN Surgery:No  Operations/Hospitalizations:Yes, teeth extractions  Anesthetic Complications: No  History of Abnormal Pap:No  Uterine Anomalies/CHRISSIE: No  Infertility: No  Artifical Reproductive Technologies Treatment: No  Relevant Family History:No  Other/Comments: No    INFECTION HISTORY:  Are you exposed to TB anywhere you work or live?: n  Do you or your Partner have Genital Herpes: n  Rash or viral illness or fever since LMP: n  Hepatitis B or C: n  History of STI (Gonorrhea, Chlamydia, HPV, HIV, Syphilis): n  Other: n  Cats n    BABY DOC Dr. Floyd             Breast feeding: y  Card given y      IMMUNIZATION HISTORY:  Chicken Pox: y  Flu Vaccine:  n  Pneumococcal if smoker or Reactive Airway Disease:  n  Tdap: 28 weeks  HPV vaccinations (Gardasil): n  Other/comments: n    FAMILY HISTORY  Diabetes: n  Hypertension: mgf  CVA/Stroke: mgm  Lupus: n  Cancers: Breast  n ovarian n,colon n,uterine: n           Genetics Screening/Teratology Counseling:  Includes Patient, Baby's Father, or anyone in either family with:  Patient's age 35 years or older as of estimated date of delivery:  n  Thalassemia: MCV less than 80: n  Neural Tube defects: n  Congenital Heart Defects: n  Down syndrome: n  Diaz-Sachs: n  Canavan Disease:  "n  Familial Dysautonomia: n  Sickle Cell Disease or Trait: n  Hemophilia or other blood disorders: n  Muscular Dystrophy: n  Cystic Fibrosis: n  Pittston's Chorea: n  Intellectual development disorder or Autism: n  Other genetic or chromosomal disorders: n  Maternal Metabolic Disorder (Type 1 DM, PKU): n  Patient or baby's father with birth defects not listed above: n  Recurrent pregnancy loss or stillbirth: n  Medications (Supplements, drugs)/ Illicit/ Recreational drugs/ Alcohol since LMP: n  Other/Comments: n    Review Of Systems:   CONSTITUTIONAL:     NEGATIVE for fever, chills, change in weight  INTEGUMENTARY/SKIN:       NEGATIVE for worrisome rashes, moles or lesions  EYES:     NEGATIVE for vision changes or irritation  ENT/MOUTH: NEGATIVE for ear, mouth and throat problems  RESP:     NEGATIVE for significant cough or SOB  CV:   NEGATIVE for chest pain, palpitations or peripheral edema  GI:     NEGATIVE for unusual nausea, abdominal pain, heartburn, or change in bowel   :   NEGATIVE for frequency, dysuria, hematuria, vaginal discharge or bleeding  MUSCULOSKELETAL:     NEGATIVE for significant arthralgias or myalgia  NEURO:      NEGATIVE for weakness, dizziness or paresthesias  ENDOCRINE:      NEGATIVE for temperature intolerance, skin/hair changes  PSYCHIATRIC:      NEGATIVE for changes in mood or affect.     PHYSICAL EXAM:   /62 (BP Location: Left arm, Patient Position: Chair, Cuff Size: Adult Regular)  Ht 5' 7\" (1.702 m)  Wt 142 lb (64.4 kg)  LMP  (LMP Unknown)  BMI 22.24 kg/m2   BMI: Body mass index is 22.24 kg/(m^2).  Constitutional: healthy, alert and no distress  Head: Normocephalic. No masses, lesions, tenderness or abnormalities  Neck: Neck supple. Trachea midline. No adenopathy. Thyroid symmetric, normal size.   Cardiovascular: RRR.   Respiratory: lungs clear   Breast: Breasts reveal mild symmetric fibrocystic densities, but there are no dominant, discrete, fixed or suspicious masses " found.  Gastrointestinal: Abdomen soft, non-tender, non-distended. No masses, organomegaly.  Pelvic:  Vulva:  No external lesions, normal female hair distribution, no inguinal adenopathy.    Urethra:  Midline, non-tender, well supported, no discharge  Vagina:  Moist, pink, no abnormal discharge, no lesions  Uterus:    , non-tender  Ovaries:  No masses appreciated  Rectal Exam: deferred    Musculoskeletal: extremities normal  Skin: no suspicious lesions or rashes  Psychiatric: Affect appropriate, cooperative,mentation appears normal.     Risk assessment done. Level is   low    ASSESSMENT:   G 3 P 1 @ 13 w 4 d  Currently breastfeeding 10 month old approx 4x a day    PLAN:  Prenatal labs   11-13 weeks 1st trimester Nuchal Translucency/Bloodwork  15-16 wk MSAFP   Level II Ultrasound at 20 weeks   Tdap at 27 weeks  Estimated Fetal Weight at 38 weeks prn     Flu shot Waiting/not decided  Return to Office:  4 weeks for prenatal care and as needed    Greater than 45 were spent in face to face counseling and interview by me for this initial new ob visit.  Bob HARP, CNM

## 2018-10-01 NOTE — NURSING NOTE
"Chief Complaint   Patient presents with     Prenatal Care     13w4d       Initial /62 (BP Location: Left arm, Patient Position: Chair, Cuff Size: Adult Regular)  Ht 5' 7\" (1.702 m)  Wt 142 lb (64.4 kg)  LMP  (LMP Unknown)  BMI 22.24 kg/m2 Estimated body mass index is 22.24 kg/(m^2) as calculated from the following:    Height as of this encounter: 5' 7\" (1.702 m).    Weight as of this encounter: 142 lb (64.4 kg).  Medication Reconciliation: complete    Lilo Win LPN    "

## 2018-10-01 NOTE — MR AVS SNAPSHOT
After Visit Summary   10/1/2018    Johana Duff    MRN: [id number]           Patient Information     Date Of Birth          1990        Visit Information        Provider Department      10/1/2018 3:00 PM Bob Riggs APRN CNM [facility]        Today's Diagnoses     Encounter for supervision of other normal pregnancy, second trimester    -  1      Care Instructions    Return to office in 4 weeks for prenatal care and as needed.    Thank you for allowing Bob HARP CNM and our OB team to participate in your care.  If you have a scheduling or an appointment question please contact Located [facility] Unit Coordinator at their direct line [phone].   ALL nursing questions or concerns can be directed to your OB nurse at: [phone] - Lilo/Anne Marie               Follow-ups after your visit        Your next 10 appointments already scheduled     Oct 31, 2018  3:00 PM CDT   (Arrive by 2:45 PM)   ESTABLISHED PRENATAL with KATIUSKA Mcclellan CNM   [facility] [place] ([facility] [place] )    [address]  [place] [address]   [phone]            May 13, 2019 10:30 AM CDT   (Arrive by 10:15 AM)   PHYSICAL with KATIUSKA Mcclellan CNM   [facility] [place] ([facility] [place] )    [address]  [place] [address]   [phone]              Who to contact     If you have questions or need follow up information about today's clinic visit or your schedule please contact [facility] directly at [phone].  Normal or non-critical lab and imaging results will be communicated to you by MyChart, letter or phone within 4 business days after the clinic has received the results. If you do not hear from us within 7 days, please contact the clinic through MyChart or phone. If you have a critical or abnormal lab result, we will notify you by phone as soon as possible.  Submit refill requests  "through [employer] or call your pharmacy and they will forward the refill request to us. Please allow 3 business days for your refill to be completed.          Additional Information About Your Visit        Care EveryWhere ID     This is your Care EveryWhere ID. This could be used by other organizations to access your [place] medical records  [id number]        Your Vitals Were     Height Last Period BMI (Body Mass Index)             5' 7\" (1.702 m) (LMP Unknown) 22.24 kg/m2          Blood Pressure from Last 3 Encounters:   10/01/18 105/62   05/21/18 108/64   05/07/18 100/60    Weight from Last 3 Encounters:   10/01/18 142 lb (64.4 kg)   05/21/18 138 lb (62.6 kg)   05/07/18 138 lb (62.6 kg)              We Performed the Following     ABO/Rh type and screen     CBC with platelets     Drug Screen Urine (Range)     GC/Chlamydia by PCR - HI,GH     Hepatitis B surface antigen     HIV Antigen Antibody Combo     Rubella Antibody IgG Quantitative     Treponema Abs w Reflex to RPR and Titer     UA with Microscopic reflex to Culture     Wet prep        Primary Care Provider Office Phone # Fax #    Debby Floyd [phone]       [facility] [place] [address]  [place] [address]        Equal Access to Services     JOHN GARCIA AH: Hadii aad ku hadasho Soomaali, waaxda luqadaha, qaybta kaalmada adeegyada, waxay idiin haymarilynn brandy bentley lajuliocesar saleh. So [facility] [phone].    ATENCIÓN: Si habla español, tiene a thomas disposición servicios gratuitos de asistencia lingüística. Llame al [phone].    We comply with applicable federal civil rights laws and [place] laws. We do not discriminate on the basis of race, color, national origin, age, disability, sex, sexual orientation, or gender identity.            Thank you!     Thank you for choosing [facility]  for your care. Our goal is always to provide you with excellent care. Hearing back from our patients is one way we can continue " to improve our services. Please take a few minutes to complete the written survey that you may receive in the mail after your visit with us. Thank you!             Your Updated Medication List - Protect others around you: Learn how to safely use, store and throw away your medicines at www.disposemymeds.org.          This list is accurate as of 10/1/18  4:18 PM.  Always use your most recent med list.                   Brand Name Dispense Instructions for use Diagnosis    PRENATAL 1 PO       Annual physical exam, Family planning

## 2018-10-02 LAB
C TRACH DNA SPEC QL PROBE+SIG AMP: NOT DETECTED
N GONORRHOEA DNA SPEC QL PROBE+SIG AMP: NOT DETECTED
SPECIMEN SOURCE: NORMAL

## 2018-10-02 ASSESSMENT — ANXIETY QUESTIONNAIRES: GAD7 TOTAL SCORE: 0

## 2018-10-02 ASSESSMENT — PATIENT HEALTH QUESTIONNAIRE - PHQ9: SUM OF ALL RESPONSES TO PHQ QUESTIONS 1-9: 0

## 2018-10-03 ENCOUNTER — HOSPITAL ENCOUNTER (OUTPATIENT)
Dept: ULTRASOUND IMAGING | Facility: HOSPITAL | Age: 28
Discharge: HOME | End: 2018-10-03
Attending: ADVANCED PRACTICE MIDWIFE | Admitting: ADVANCED PRACTICE MIDWIFE
Payer: COMMERCIAL

## 2018-10-03 DIAGNOSIS — Z34.91 ENCOUNTER FOR SUPERVISION OF NORMAL PREGNANCY IN FIRST TRIMESTER: Primary | ICD-10-CM

## 2018-10-03 DIAGNOSIS — Z34.82 ENCOUNTER FOR SUPERVISION OF OTHER NORMAL PREGNANCY, SECOND TRIMESTER: ICD-10-CM

## 2018-10-03 LAB
HBV SURFACE AG SERPL QL IA: NONREACTIVE
HIV 1+2 AB+HIV1 P24 AG SERPL QL IA: NONREACTIVE
RUBV IGG SERPL IA-ACNC: 11 IU/ML
T PALLIDUM AB SER QL: NONREACTIVE

## 2018-10-03 PROCEDURE — 82105 ALPHA-FETOPROTEIN SERUM: CPT | Performed by: ADVANCED PRACTICE MIDWIFE

## 2018-10-03 PROCEDURE — 76801 OB US < 14 WKS SINGLE FETUS: CPT | Mod: TC

## 2018-10-03 PROCEDURE — 84704 HCG FREE BETACHAIN TEST: CPT | Performed by: ADVANCED PRACTICE MIDWIFE

## 2018-10-03 PROCEDURE — 36415 COLL VENOUS BLD VENIPUNCTURE: CPT | Performed by: ADVANCED PRACTICE MIDWIFE

## 2018-10-03 PROCEDURE — 84163 PAPPA SERUM: CPT | Performed by: ADVANCED PRACTICE MIDWIFE

## 2018-10-10 LAB — FIRST TRIMESTER SCREEN BIOCHEM MARKERS: NORMAL

## 2018-10-31 ENCOUNTER — PRENATAL OFFICE VISIT (OUTPATIENT)
Dept: OBGYN | Facility: OTHER | Age: 28
End: 2018-10-31
Attending: ADVANCED PRACTICE MIDWIFE
Payer: COMMERCIAL

## 2018-10-31 VITALS — BODY MASS INDEX: 22.87 KG/M2 | SYSTOLIC BLOOD PRESSURE: 102 MMHG | WEIGHT: 146 LBS | DIASTOLIC BLOOD PRESSURE: 62 MMHG

## 2018-10-31 DIAGNOSIS — Z34.83 ENCOUNTER FOR SUPERVISION OF OTHER NORMAL PREGNANCY, THIRD TRIMESTER: Primary | ICD-10-CM

## 2018-10-31 DIAGNOSIS — Z34.82 ENCOUNTER FOR SUPERVISION OF OTHER NORMAL PREGNANCY, SECOND TRIMESTER: ICD-10-CM

## 2018-10-31 PROCEDURE — 99207 ZZC PRENATAL VISIT: CPT | Performed by: ADVANCED PRACTICE MIDWIFE

## 2018-10-31 PROCEDURE — 99000 SPECIMEN HANDLING OFFICE-LAB: CPT | Performed by: ADVANCED PRACTICE MIDWIFE

## 2018-10-31 PROCEDURE — 36415 COLL VENOUS BLD VENIPUNCTURE: CPT | Performed by: ADVANCED PRACTICE MIDWIFE

## 2018-10-31 PROCEDURE — 82105 ALPHA-FETOPROTEIN SERUM: CPT | Mod: 90 | Performed by: ADVANCED PRACTICE MIDWIFE

## 2018-10-31 ASSESSMENT — PAIN SCALES - GENERAL: PAINLEVEL: NO PAIN (0)

## 2018-10-31 NOTE — PATIENT INSTRUCTIONS
Return to office in 4 weeks for prenatal visit and as needed.    Thank you for allowing Bob HARP CNM and our OB team to participate in your care.  If you have a scheduling or an appointment question please contact [facility] Unit Coordinator at their direct line [phone].   ALL nursing questions or concerns can be directed to your OB nurse at: [phone] Erin Nagy/Anne Marie

## 2018-10-31 NOTE — MR AVS SNAPSHOT
After Visit Summary   10/31/2018    Johana Duff    MRN: [id number]           Patient Information     Date Of Birth          1990        Visit Information        Provider Department      10/31/2018 3:00 PM Bob Riggs APRN CNM [facility]        Today's Diagnoses     Encounter for supervision of other normal pregnancy, third trimester    -  1    Encounter for supervision of other normal pregnancy, second trimester          Care Instructions    Return to office in 4 weeks for prenatal visit and as needed.    Thank you for allowing Bob HARP CNM and our OB team to participate in your care.  If you have a scheduling or an appointment question please contact [facility] Unit Coordinator at their direct line [phone].   ALL nursing questions or concerns can be directed to your OB nurse at: [phone] Erin Nagy/Anne Marie               Follow-ups after your visit        Your next 10 appointments already scheduled     Nov 13, 2018 10:00 AM [facility]   US OB TELEMEDICINE LEVEL II COMPREHENSIVE SINGLE with HIUS2   HI ULTRASOUND ([facility] )    [address] [place] [address]   [phone]           Please bring a list of your medicines (including vitamins, minerals and over-the-counter drugs). Also, tell your doctor about any allergies you may have. Wear comfortable clothes and leave your valuables at home.  If you're less than 20 weeks drink four 8-ounce glasses of fluid an hour before your exam. If you need to empty your bladder before your exam, try to release only a little urine. Then, drink another glass of fluid.  We do not allow the use of cameras or video during the exam.  The sonographer will be happy to provide take home pictures.  You may have up to two adult family members in the exam room.  Although we encourage family participation, we ask that you consider not bringing young children to these appointments.  Background noise may  interfere with the telemedicine connection.  Please call the Imaging Department at your exam site with any questions.            Nov 13, 2018 10:00 AM CST   MFJONNATHAN TELEMEDICINE US COMPREHENSIVE SINGLE with PARAMJIT   [facility] Maternal Fetal Medicine Ultrasound - [place] ([facility])    [address]  [facility] [address]   [phone]            Nov 13, 2018 10:30 AM CST   Radiology MD with UR SRAVAN MCDONALD   [facility] Maternal Fetal Medicine - [facility])    [address]  [facility] [address]   [phone]           Please arrive at the time given for your first appointment. This visit is used internally to schedule the physician's time during your ultrasound.            Nov 26, 2018  3:00 PM CST   (Arrive by 2:45 PM)   ESTABLISHED PRENATAL with KATIUSKA Mcclellan CNM   [facility]    [address]  [facility] [address]   [phone]            May 13, 2019 10:30 AM CDT   (Arrive by 10:15 AM)   PHYSICAL with KATIUSKA Mcclellan CNM   [facility] ([facility] )    [address] [facility] [address]   [phone]              Who to contact     If you have questions or need follow up information about today's clinic visit or your schedule please contact [facility] directly at [phone].  Normal or non-critical lab and imaging results will be communicated to you by MyChart, letter or phone within 4 business days after the clinic has received the results. If you do not hear from us within 7 days, please contact the clinic through MyChart or phone. If you have a critical or abnormal lab result, we will notify you by phone as soon as possible.  Submit refill requests through [employer] or call your pharmacy and they will forward the refill request to us. Please allow 3 business days for your refill  to be completed.          Additional Information About Your Visit        Care EveryWhere ID     This is your Care EveryWhere ID. This could be used by other organizations to access your [place] medical records  [id number]        Your Vitals Were     Last Period BMI (Body Mass Index)                (LMP Unknown) 22.87 kg/m2           Blood Pressure from Last 3 Encounters:   10/31/18 102/62   10/01/18 105/62   05/21/18 108/64    Weight from Last 3 Encounters:   10/31/18 146 lb (66.2 kg)   10/01/18 142 lb (64.4 kg)   05/21/18 138 lb (62.6 kg)              We Performed the Following     Alpha fetoprotein maternal screen        Primary Care Provider Office Phone # Fax #    Debby Floyd [phone]       [facility] [place] [address]  [place] [address]        Equal Access to Services     [facility]: Hadii aad ku hadasho Soomaali, waaxda luqadaha, qaybta kaalmada adeegyada, suellen looneyin hayaan brandy finch . So [facility] [phone].    ATENCIÓN: Si habla español, tiene a thomas disposición servicios gratuitos de asistencia lingüística. Llame al [phone].    We comply with applicable federal civil rights laws and [place] laws. We do not discriminate on the basis of race, color, national origin, age, disability, sex, sexual orientation, or gender identity.            Thank you!     Thank you for choosing [facility]  for your care. Our goal is always to provide you with excellent care. Hearing back from our patients is one way we can continue to improve our services. Please take a few minutes to complete the written survey that you may receive in the mail after your visit with us. Thank you!             Your Updated Medication List - Protect others around you: Learn how to safely use, store and throw away your medicines at www.disposemymeds.org.          This list is accurate as of 10/31/18  5:13 PM.  Always use your most recent med list.                    Brand Name Dispense Instructions for use Diagnosis    PRENATAL 1 PO       Annual physical exam, Family planning

## 2018-10-31 NOTE — PROGRESS NOTES
Doing well.  Baby active. Fluttering  Denies contractions, bleeding, or leakage of fluid.     Discussed:  MSAFP, anatomy U/S, birth plan    Plan:  MSAFP  Anatomy U/S on 11/13/18    Return to office in 4 weeks for prenatal care and as needed.    Bob Riggs, KATIUSKA, CNM

## 2018-11-03 LAB
# FETUSES US: NORMAL
# FETUSES: NORMAL
AFP ADJ MOM AMN: 1.15
AFP SERPL-MCNC: 50 NG/ML
AGE - REPORTED: 30.4 YR
CURRENT SMOKER: NO
CURRENT SMOKER: NO
DIABETES STATUS PATIENT: NO
FAMILY MEMBER DISEASES HX: NO
FAMILY MEMBER DISEASES HX: NO
GA METHOD: NORMAL
GA METHOD: NORMAL
GA: NORMAL WK
IDDM PATIENT QL: NO
INTEGRATED SCN PATIENT-IMP: NORMAL
LMP START DATE: NORMAL
MONOCHORIONIC TWINS: NO
SERVICE CMNT-IMP: NO
SPECIMEN DRAWN SERPL: NORMAL
VALPROIC/CARBAMAZEPINE STATUS: NO
WEIGHT UNITS: NORMAL

## 2018-11-13 ENCOUNTER — HOSPITAL ENCOUNTER (OUTPATIENT)
Dept: ULTRASOUND IMAGING | Facility: HOSPITAL | Age: 28
Discharge: HOME | End: 2018-11-13
Attending: ADVANCED PRACTICE MIDWIFE | Admitting: ADVANCED PRACTICE MIDWIFE
Payer: COMMERCIAL

## 2018-11-13 ENCOUNTER — OFFICE VISIT (OUTPATIENT)
Dept: MATERNAL FETAL MEDICINE | Facility: CLINIC | Age: 28
End: 2018-11-13
Attending: ADVANCED PRACTICE MIDWIFE
Payer: COMMERCIAL

## 2018-11-13 ENCOUNTER — HOSPITAL ENCOUNTER (OUTPATIENT)
Dept: ULTRASOUND IMAGING | Facility: CLINIC | Age: 28
End: 2018-11-13
Attending: ADVANCED PRACTICE MIDWIFE
Payer: COMMERCIAL

## 2018-11-13 DIAGNOSIS — Z34.82 ENCOUNTER FOR SUPERVISION OF OTHER NORMAL PREGNANCY, SECOND TRIMESTER: ICD-10-CM

## 2018-11-13 DIAGNOSIS — Z36.3 SCREENING, ANTENATAL, FOR MALFORMATION BY ULTRASOUND: Primary | ICD-10-CM

## 2018-11-13 NOTE — MR AVS SNAPSHOT
After Visit Summary   2018    Johana Duff    MRN: [id number]           Patient Information     Date Of Birth          1990        Visit Information        Provider Department      2018 10:30 AM ABRAHAN HINSON MD [facility] Maternal Fetal Medicine [facility]        Today's Diagnoses     Screening, , for malformation by ultrasound    -  1       Follow-ups after your visit        Your next 10 appointments already scheduled     2018  3:00 PM CST   (Arrive by 2:45 PM)   ESTABLISHED PRENATAL with KATIUSKA Mcclellan CNM   [facility] [place] ([facility] [place] [facility]    [address]  [place] [address]   [phone]            May 13, 2019 10:30 AM CDT   (Arrive by 10:15 AM)   PHYSICAL with KATIUSKA Mcclellan CNM   [facility] [place] ([facility] [place] )    [address]  [place] [address]   [phone]              Who to contact     If you have questions or need follow up information about today's clinic visit or your schedule please contact [facility] MATERNAL FETAL MEDICINE [facility] directly at [phone].  Normal or non-critical lab and imaging results will be communicated to you by MyChart, letter or phone within 4 business days after the clinic has received the results. If you do not hear from us within 7 days, please contact the clinic through MyChart or phone. If you have a critical or abnormal lab result, we will notify you by phone as soon as possible.  Submit refill requests through [employer] or call your pharmacy and they will forward the refill request to us. Please allow 3 business days for your refill to be completed.          Additional Information About Your Visit        Care EveryWhere ID     This is your Care EveryWhere ID. This could be used by other organizations to access your [place] medical records  [id number]        Your Vitals Were     Last Period                   (LMP Unknown)             Blood Pressure from Last 3 Encounters:   10/31/18 102/62   10/01/18 105/62   05/21/18 108/64    Weight from Last 3 Encounters:   10/31/18 66.2 kg (146 lb)   10/01/18 64.4 kg (142 lb)   05/21/18 62.6 kg (138 lb)              Today, you had the following     No orders found for display       Primary Care Provider Office Phone # Fax #    Debby Floyd [phone]       [facility] [place] [address]  [place] [address]        Equal Access to Services     [facility]: Hadii aad ku hadasho Soomaali, waaxda luqadaha, qaybta kaalmada adeegyada, suellen bowden hayaan adedane finch . So [facility] [phone].    ATENCIÓN: Si habla español, tiene a thomas disposición servicios gratuitos de asistencia lingüística. Llame al [phone].    We comply with applicable federal civil rights laws and [place] laws. We do not discriminate on the basis of race, color, national origin, age, disability, sex, sexual orientation, or gender identity.            Thank you!     Thank you for choosing [facility]  for your care. Our goal is always to provide you with excellent care. Hearing back from our patients is one way we can continue to improve our services. Please take a few minutes to complete the written survey that you may receive in the mail after your visit with us. Thank you!             Your Updated Medication List - Protect others around you: Learn how to safely use, store and throw away your medicines at www.disposemymeds.org.          This list is accurate as of 11/13/18 11:11 AM.  Always use your most recent med list.                   Brand Name Dispense Instructions for use Diagnosis    PRENATAL 1 PO       Annual physical exam, Family planning

## 2018-11-13 NOTE — PROGRESS NOTES
Type of service: Telemedicine Office Visit for ultrasound    Date of service:  Date: 11/13/18     Time service began:  10:30 AM    Time service ended:  11:00 AM    Reason: .tel: Patient unable to travel    Description of basis or telemedicine appropriateness:     Consultation provided at the request of Bob Riggs for advice regarding the diagnosis and treatment of this patient's pregnancy.  The patient's condition can be safely assessed via telemedicine.    The Mode of Transmission:    Secure interactive audio and visual telecommunication system (Video Guidance)    Location of originating and distant sites:      Originating site:   [place], [place]    Distant site:    [place], [place]    Mitch Orellana

## 2018-11-21 ENCOUNTER — OFFICE VISIT (OUTPATIENT)
Dept: CHIROPRACTIC MEDICINE | Facility: OTHER | Age: 28
End: 2018-11-21
Attending: CHIROPRACTOR
Payer: COMMERCIAL

## 2018-11-21 DIAGNOSIS — M54.50 ACUTE BILATERAL LOW BACK PAIN WITHOUT SCIATICA: ICD-10-CM

## 2018-11-21 DIAGNOSIS — M99.03 SEGMENTAL AND SOMATIC DYSFUNCTION OF LUMBAR REGION: Primary | ICD-10-CM

## 2018-11-21 DIAGNOSIS — M99.02 SEGMENTAL AND SOMATIC DYSFUNCTION OF THORACIC REGION: ICD-10-CM

## 2018-11-21 DIAGNOSIS — M99.01 SEGMENTAL AND SOMATIC DYSFUNCTION OF CERVICAL REGION: ICD-10-CM

## 2018-11-21 PROCEDURE — 98941 CHIROPRACT MANJ 3-4 REGIONS: CPT | Mod: AT | Performed by: CHIROPRACTOR

## 2018-11-21 PROCEDURE — 99202 OFFICE O/P NEW SF 15 MIN: CPT | Mod: 25 | Performed by: CHIROPRACTOR

## 2018-11-21 NOTE — MR AVS SNAPSHOT
After Visit Summary   11/21/2018    Johana Duff    MRN: [id number]           Patient Information     Date Of Birth          1990        Visit Information        Provider Department      11/21/2018 10:40 AM Luis Park DC [facility] [place] [facility]        Today's Diagnoses     Segmental and somatic dysfunction of lumbar region    -  1    Acute bilateral low back pain without sciatica        Segmental and somatic dysfunction of thoracic region        Segmental and somatic dysfunction of cervical region           Follow-ups after your visit        Your next 10 appointments already scheduled     Nov 26, 2018  3:10 PM CST   Return Visit with Luis Park DC   [facility] [place] [facility] [place] [facility]    [address] [place] [address]  [place] [address]   [phone]            Nov 28, 2018  1:30 PM CST   (Arrive by 1:15 PM)   ESTABLISHED PRENATAL with KATIUSKA Mcclellan CNM   [facility] [place] ([facility] [place] [facility]    [address]  [place] [address]   [phone]            Dec 20, 2018  3:00 PM CST   Return Visit with Luis Park DC   [facility] [place] [facility] ([facility]    [address] [place] [address]  [place] [address]   [phone]            May 13, 2019 10:30 AM CDT   (Arrive by 10:15 AM)   PHYSICAL with KATIUSKA Mcclellan CNM   [facility] [place] ([facility] [place] )    [address]  [place] [address]   [phone]              Who to contact     If you have questions or need follow up information about today's clinic visit or your schedule please contact  [facility] directly at [phone].  Normal or non-critical lab and imaging results will be communicated to you by MyChart, letter or phone within 4 business days after the clinic has received the results. If you do not hear from us within 7 days, please contact the clinic through MyChart or phone. If you have a critical or abnormal lab result, we will  notify you by phone as soon as possible.  Submit refill requests through [employer] or call your pharmacy and they will forward the refill request to us. Please allow 3 business days for your refill to be completed.          Additional Information About Your Visit        Care EveryWhere ID     This is your Care EveryWhere ID. This could be used by other organizations to access your [place] medical records  [id number]        Your Vitals Were     Last Period                   (LMP Unknown)            Blood Pressure from Last 3 Encounters:   10/31/18 102/62   10/01/18 105/62   05/21/18 108/64    Weight from Last 3 Encounters:   10/31/18 146 lb (66.2 kg)   10/01/18 142 lb (64.4 kg)   05/21/18 138 lb (62.6 kg)              We Performed the Following     CHIROPRAC MANIP,SPINAL,3-4 REGIONS        Primary Care Provider Office Phone # Fax #    Debby Floyd [phone]       [facility] [place] [address]  [place] [address]        Equal Access to Services     [facility]: Hadii aad ku hadasho Soomaali, waaxda luqadaha, qaybta kaalmada adeegyada, waxay idiin haymarilynn brandy finch . So [facility] [phone].    ATENCIÓN: Si habla español, tiene a thomas disposición servicios gratuitos de asistencia lingüística. [facility] [phone].    We comply with applicable federal civil rights laws and [place] laws. We do not discriminate on the basis of race, color, national origin, age, disability, sex, sexual orientation, or gender identity.            Thank you!     Thank you for choosing  [facility] [place] [facility]  for your care. Our goal is always to provide you with excellent care. Hearing back from our patients is one way we can continue to improve our services. Please take a few minutes to complete the written survey that you may receive in the mail after your visit with us. Thank you!             Your Updated Medication List - Protect others around you: Learn how to safely use, store and throw away  your medicines at www.disposemymeds.org.          This list is accurate as of 11/21/18 11:59 PM.  Always use your most recent med list.                   Brand Name Dispense Instructions for use Diagnosis    PRENATAL 1 PO       Annual physical exam, Family planning

## 2018-11-26 ENCOUNTER — OFFICE VISIT (OUTPATIENT)
Dept: CHIROPRACTIC MEDICINE | Facility: OTHER | Age: 28
End: 2018-11-26
Attending: CHIROPRACTOR
Payer: COMMERCIAL

## 2018-11-26 DIAGNOSIS — M54.50 ACUTE BILATERAL LOW BACK PAIN WITHOUT SCIATICA: ICD-10-CM

## 2018-11-26 DIAGNOSIS — M99.03 SEGMENTAL AND SOMATIC DYSFUNCTION OF LUMBAR REGION: Primary | ICD-10-CM

## 2018-11-26 DIAGNOSIS — M99.02 SEGMENTAL AND SOMATIC DYSFUNCTION OF THORACIC REGION: ICD-10-CM

## 2018-11-26 DIAGNOSIS — M99.01 SEGMENTAL AND SOMATIC DYSFUNCTION OF CERVICAL REGION: ICD-10-CM

## 2018-11-26 PROCEDURE — 98941 CHIROPRACT MANJ 3-4 REGIONS: CPT | Mod: AT | Performed by: CHIROPRACTOR

## 2018-11-26 NOTE — MR AVS SNAPSHOT
After Visit Summary   11/26/2018    Johana Duff    MRN: [id number]           Patient Information     Date Of Birth          1990        Visit Information        Provider Department      11/26/2018 3:10 PM [facility]        Today's Diagnoses     Segmental and somatic dysfunction of lumbar region    -  1    Acute bilateral low back pain without sciatica        Segmental and somatic dysfunction of thoracic region        Segmental and somatic dysfunction of cervical region           Follow-ups after your visit        Your next 10 appointments already scheduled     Nov 28, 2018  1:30 PM CST   (Arrive by 1:15 PM)   ESTABLISHED PRENATAL with KATIUSKA Mcclellan CNM   [facility] [place] [facility]    [address]  [place] [address]   [phone]            Dec 20, 2018  3:00 PM CST   Return Visit with Luis Park DC   [facility] [place]    [address]  [place] [address]   [phone]            May 13, 2019 10:30 AM CDT   (Arrive by 10:15 AM)   PHYSICAL with KATIUSKA Mcclellan CNM   [facility] [place] ([facility] [place] )    [address]  [place] [address]   [phone]              Who to contact     If you have questions or need follow up information about today's clinic visit or your schedule please contact  [facility] directly at [phone].  Normal or non-critical lab and imaging results will be communicated to you by MyChart, letter or phone within 4 business days after the clinic has received the results. If you do not hear from us within 7 days, please contact the clinic through MyChart or phone. If you have a critical or abnormal lab result, we will notify you by phone as soon as possible.  Submit refill requests through [employer] or call your pharmacy and they will forward the refill request to us. Please allow 3 business days for  your refill to be completed.          Additional Information About Your Visit        Care EveryWhere ID     This is your Care EveryWhere ID. This could be used by other organizations to access your [place] medical records  [id number]        Your Vitals Were     Last Period                   (LMP Unknown)            Blood Pressure from Last 3 Encounters:   10/31/18 102/62   10/01/18 105/62   05/21/18 108/64    Weight from Last 3 Encounters:   10/31/18 146 lb (66.2 kg)   10/01/18 142 lb (64.4 kg)   05/21/18 138 lb (62.6 kg)              We Performed the Following     CHIROPRAC MANIP,SPINAL,3-4 REGIONS        Primary Care Provider Office Phone # Fax #    Debby Floyd [phone]       [facility] [place] [address]  [facility] [address]        Equal Access to Services     JOHN GARCIA : Hadii aad ku hadasho Soomaali, waaxda luqadaha, qaybta kaalmada adeegyada, suellen looneyin hayaan adedane finch . So [facility] [phone].    ATENCIÓN: Si habla español, tiene a thomas disposición servicios gratuitos de asistencia lingüística. Jamie al [phone].    We comply with applicable federal civil rights laws and [place] laws. We do not discriminate on the basis of race, color, national origin, age, disability, sex, sexual orientation, or gender identity.            Thank you!     Thank you for choosing  [facility] [place] [facility]  for your care. Our goal is always to provide you with excellent care. Hearing back from our patients is one way we can continue to improve our services. Please take a few minutes to complete the written survey that you may receive in the mail after your visit with us. Thank you!             Your Updated Medication List - Protect others around you: Learn how to safely use, store and throw away your medicines at www.disposemymeds.org.          This list is accurate as of 11/26/18 11:59 PM.  Always use your most recent med list.                   Brand Name Dispense  Instructions for use Diagnosis    PRENATAL 1 PO       Annual physical exam, Family planning

## 2018-11-26 NOTE — PROGRESS NOTES
Subjective Finding:    Chief compalint: Patient presents with:  Back Pain: with pregnancy  Neck Pain  , Pain Scale: 7/10, Intensity: sharp, Duration: 1 weeks, Radiating: no.    Date of injury:     Activities that the pain restricts:   Home/household/hobbies/social activities: yes.  Work duties: yes.  Sleep: yes.  Makes symptoms better: rest.  Makes symptoms worse: activity.  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 Yes: MD.  Work related: no.  Automobile related injury: no.    Objective and Assessment:    Posture Analysis:   High shoulder: .  Head tilt: .  High iliac crest: .  Head carriage: forward.  Thoracic Kyphosis: neutral.  Lumbar Lordosis: forward.    Lumbar Range of Motion: extension decreased.  Cervical Range of Motion: extension decreased.  Thoracic Range of Motion: extension decreased.  Extremity Range of Motion: .    Palpation:   Quad lumb: bilateral, referred pain: no  Lev scapulae: dull pain, referred pain: no    Segmental dysfunction pre-treatment and treatment area: C3, C4, T5, L4 and L5.    Assessment post-treatment:  Cervical: ROM increased.  Thoracic: ROM increased.  Lumbar: ROM increased.    Comments: .      Complicating Factors: .    Procedure(s):  CMT:  96595 Chiropractic manipulative treatment 3-4 regions performed   Cervical: Diversified, See above for level, Supine, Thoracic: Diversified, See above for level, Prone and Lumbar: Diversified, See above for level, Side posture    Modalities:  None performed this visit    Therapeutic procedures:  None    Plan:  Treatment plan: PRN.  Instructed patient: stretch as instructed at visit.  Short term goals: reduce pain.  Long term goals: restore normal function.  Prognosis: excellent.

## 2018-11-27 NOTE — PROGRESS NOTES

## 2018-11-28 ENCOUNTER — PRENATAL OFFICE VISIT (OUTPATIENT)
Dept: OBGYN | Facility: OTHER | Age: 28
End: 2018-11-28
Attending: FAMILY MEDICINE
Payer: COMMERCIAL

## 2018-11-28 VITALS — WEIGHT: 150 LBS | SYSTOLIC BLOOD PRESSURE: 110 MMHG | DIASTOLIC BLOOD PRESSURE: 58 MMHG | BODY MASS INDEX: 23.49 KG/M2

## 2018-11-28 DIAGNOSIS — Z53.9 ERRONEOUS ENCOUNTER--DISREGARD: Primary | ICD-10-CM

## 2018-11-28 ASSESSMENT — PAIN SCALES - GENERAL: PAINLEVEL: NO PAIN (0)

## 2018-11-28 NOTE — MR AVS SNAPSHOT
After Visit Summary   11/28/2018    Johana Duff    MRN: [id number]           Patient Information     Date Of Birth          1990        Visit Information        Provider Department      11/28/2018 1:30 PM Bob Riggs APRN CNM [facility]        Today's Diagnoses     ERRONEOUS ENCOUNTER--DISREGARD    -  1       Follow-ups after your visit        Your next 10 appointments already scheduled     Dec 05, 2018 11:15 AM CST   (Arrive by 11:00 AM)   ESTABLISHED PRENATAL with KATIUSKA Mcclellan CNM   [facility] [place] ([facility] [place] )    [address]  [place] [address]   [phone]            Dec 20, 2018  3:00 PM CST   Return Visit with Luis Park DC   [facility] [place]    [address]  [place] [address]   [phone]            May 13, 2019 10:30 AM CDT   (Arrive by 10:15 AM)   PHYSICAL with KATIUSKA Mcclellan CNM   [facility] [place] ([facility] [place] )    [address]  [place] [address]   [phone]              Who to contact     If you have questions or need follow up information about today's clinic visit or your schedule please contact [facility] directly at [phone].  Normal or non-critical lab and imaging results will be communicated to you by MyChart, letter or phone within 4 business days after the clinic has received the results. If you do not hear from us within 7 days, please contact the clinic through MyChart or phone. If you have a critical or abnormal lab result, we will notify you by phone as soon as possible.  Submit refill requests through [employer] or call your pharmacy and they will forward the refill request to us. Please allow 3 business days for your refill to be completed.          Additional Information About Your Visit        Care EveryWhere ID     This is your Care EveryWhere ID. This could be used by  other organizations to access your [place] medical records  [id number]        Your Vitals Were     Last Period BMI (Body Mass Index)                (LMP Unknown) 23.49 kg/m2           Blood Pressure from Last 3 Encounters:   11/28/18 110/58   10/31/18 102/62   10/01/18 105/62    Weight from Last 3 Encounters:   11/28/18 150 lb (68 kg)   10/31/18 146 lb (66.2 kg)   10/01/18 142 lb (64.4 kg)              Today, you had the following     No orders found for display       Primary Care Provider Office Phone # Fax #    Debby Floyd [phone]       [facility] [place] [address]  [place] [address]        Equal Access to Services     ASAEL GARCIA : Hadii aad ku hadasho Soomaali, waaxda luqadaha, qaybta kaalmada adeegyada, waxusha finch . So [facility] [phone].    ATENCIÓN: Si habla español, tiene a thomas disposición servicios gratuitos de asistencia lingüística. Llame al [phone].    We comply with applicable federal civil rights laws and [place] laws. We do not discriminate on the basis of race, color, national origin, age, disability, sex, sexual orientation, or gender identity.            Thank you!     Thank you for choosing [facility]  for your care. Our goal is always to provide you with excellent care. Hearing back from our patients is one way we can continue to improve our services. Please take a few minutes to complete the written survey that you may receive in the mail after your visit with us. Thank you!             Your Updated Medication List - Protect others around you: Learn how to safely use, store and throw away your medicines at www.disposemymeds.org.          This list is accurate as of 11/28/18  2:06 PM.  Always use your most recent med list.                   Brand Name Dispense Instructions for use Diagnosis    PRENATAL 1 PO       Annual physical exam, Family planning

## 2018-12-05 ENCOUNTER — PRENATAL OFFICE VISIT (OUTPATIENT)
Dept: OBGYN | Facility: OTHER | Age: 28
End: 2018-12-05
Attending: ADVANCED PRACTICE MIDWIFE
Payer: COMMERCIAL

## 2018-12-05 VITALS — WEIGHT: 150 LBS | BODY MASS INDEX: 23.49 KG/M2 | SYSTOLIC BLOOD PRESSURE: 106 MMHG | DIASTOLIC BLOOD PRESSURE: 56 MMHG

## 2018-12-05 DIAGNOSIS — Z34.82 ENCOUNTER FOR SUPERVISION OF OTHER NORMAL PREGNANCY, SECOND TRIMESTER: Primary | ICD-10-CM

## 2018-12-05 PROCEDURE — 99207 ZZC PRENATAL VISIT: CPT | Performed by: ADVANCED PRACTICE MIDWIFE

## 2018-12-05 ASSESSMENT — PAIN SCALES - GENERAL: PAINLEVEL: NO PAIN (0)

## 2018-12-05 NOTE — MR AVS SNAPSHOT
After Visit Summary   12/5/2018    Johana Duff    MRN: [id number]           Patient Information     Date Of Birth          1990        Visit Information        Provider Department      12/5/2018 11:15 AM Bob Riggs APRN CNM [facility]        Today's Diagnoses     Encounter for supervision of other normal pregnancy, second trimester    -  1      Care Instructions    Return to office in 4 weeks for prenatal care and as needed.    Thank you for allowing Bob HARP CNM and our OB team to participate in your care.  If you have a scheduling or an appointment question please contact [facility] Unit Coordinator at their direct line [phone].   ALL nursing questions or concerns can be directed to your OB nurse at: [phone] - Lilo/Anne Marie               Follow-ups after your visit        Your next 10 appointments already scheduled     Dec 20, 2018  3:00 PM CST   [facility]    [address]  [place] [address]   [phone]            Jan 02, 2019  2:00 PM CST   (Arrive by 1:45 PM)   ESTABLISHED PRENATAL with KATIUSKA Mcclellan CNM   [facility]    [address]  [place] [address]   [phone]            May 13, 2019 10:30 AM CDT   (Arrive by 10:15 AM)   PHYSICAL with KATIUSKA Mcclellan CNM   [facility] [place] ([facility] [place] )    [address]  [place] [address]   [phone]              Who to contact     If you have questions or need follow up information about today's clinic visit or your schedule please contact [facility] directly at [phone].  Normal or non-critical lab and imaging results will be communicated to you by MyChart, letter or phone within 4 business days after the clinic has received the results. If you do not hear from us within 7  days, please contact the clinic through [employer] or phone. If you have a critical or abnormal lab result, we will notify you by phone as soon as possible.  Submit refill requests through [employer] or call your pharmacy and they will forward the refill request to us. Please allow 3 business days for your refill to be completed.          Additional Information About Your Visit        Care EveryWhere ID     This is your Care EveryWhere ID. This could be used by other organizations to access your [place] medical records  [id number]        Your Vitals Were     Last Period BMI (Body Mass Index)                (LMP Unknown) 23.49 kg/m2           Blood Pressure from Last 3 Encounters:   12/05/18 106/56   11/28/18 110/58   10/31/18 102/62    Weight from Last 3 Encounters:   12/05/18 150 lb (68 kg)   11/28/18 150 lb (68 kg)   10/31/18 146 lb (66.2 kg)              Today, you had the following     No orders found for display       Primary Care Provider Office Phone # Fax #    Debby Floyd [phone]       [facility] [place] [address] [address]        Equal Access to Services     [facility]: Hadii aad ku hadasho Soomaali, waaxda luqadaha, qaybta kaalmada adeegyada, suellen bowden haymarilynn brandy finch . So [facility] [phone].    ATENCIÓN: Si habla español, tiene a thomas disposición servicios gratuitos de asistencia lingüística. Llame al [phone].    We comply with applicable federal civil rights laws and [place] laws. We do not discriminate on the basis of race, color, national origin, age, disability, sex, sexual orientation, or gender identity.            Thank you!     Thank you for choosing [facility]  for your care. Our goal is always to provide you with excellent care. Hearing back from our patients is one way we can continue to improve our services. Please take a few minutes to complete the written survey that you may receive in the mail after  your visit with us. Thank you!             Your Updated Medication List - Protect others around you: Learn how to safely use, store and throw away your medicines at www.disposemymeds.org.          This list is accurate as of 12/5/18  9:57 PM.  Always use your most recent med list.                   Brand Name Dispense Instructions for use Diagnosis    PRENATAL 1 PO       Annual physical exam, Family planning

## 2018-12-05 NOTE — PROGRESS NOTES
Doing well.  Baby active.   Denies contractions, bleeding, or leakage of fluid.     Discussed:  Gender, U/S, PTL, fetal movement    Plan:  1 hour GTT @ 28 weeks    Return to office in 4 weeks for prenatal care and as needed.    KATIUSKA Overton, CNM

## 2018-12-20 ENCOUNTER — OFFICE VISIT (OUTPATIENT)
Dept: CHIROPRACTIC MEDICINE | Facility: OTHER | Age: 28
End: 2018-12-20
Attending: CHIROPRACTOR
Payer: COMMERCIAL

## 2018-12-20 DIAGNOSIS — M99.02 SEGMENTAL AND SOMATIC DYSFUNCTION OF THORACIC REGION: ICD-10-CM

## 2018-12-20 DIAGNOSIS — M99.03 SEGMENTAL AND SOMATIC DYSFUNCTION OF LUMBAR REGION: Primary | ICD-10-CM

## 2018-12-20 DIAGNOSIS — M99.01 SEGMENTAL AND SOMATIC DYSFUNCTION OF CERVICAL REGION: ICD-10-CM

## 2018-12-20 DIAGNOSIS — M54.50 ACUTE BILATERAL LOW BACK PAIN WITHOUT SCIATICA: ICD-10-CM

## 2018-12-20 PROCEDURE — 98941 CHIROPRACT MANJ 3-4 REGIONS: CPT | Mod: AT | Performed by: CHIROPRACTOR

## 2018-12-26 NOTE — PROGRESS NOTES

## 2019-01-02 ENCOUNTER — PRENATAL OFFICE VISIT (OUTPATIENT)
Dept: OBGYN | Facility: OTHER | Age: 29
End: 2019-01-02
Attending: ADVANCED PRACTICE MIDWIFE
Payer: COMMERCIAL

## 2019-01-02 VITALS
HEIGHT: 67 IN | SYSTOLIC BLOOD PRESSURE: 100 MMHG | WEIGHT: 157 LBS | DIASTOLIC BLOOD PRESSURE: 56 MMHG | BODY MASS INDEX: 24.64 KG/M2

## 2019-01-02 DIAGNOSIS — Z34.82 ENCOUNTER FOR SUPERVISION OF OTHER NORMAL PREGNANCY, SECOND TRIMESTER: Primary | ICD-10-CM

## 2019-01-02 PROCEDURE — 99207 ZZC PRENATAL VISIT: CPT | Performed by: ADVANCED PRACTICE MIDWIFE

## 2019-01-02 ASSESSMENT — MIFFLIN-ST. JEOR: SCORE: 1464.78

## 2019-01-02 ASSESSMENT — PAIN SCALES - GENERAL: PAINLEVEL: NO PAIN (0)

## 2019-01-02 NOTE — PROGRESS NOTES
Doing well.  Baby active.   Denies contractions, bleeding, or leakage of fluid.     Discussed:  Pain management/natural labor and delivery, GTT, 3rd tri labs, Tdap, kick count    Plan:  Next wekk:   One hour GTT   Tdap   3rd tri labs    Return to office in 1 weeks for prenatal care and as needed.    KATIUSKA Overton, CNM

## 2019-01-02 NOTE — NURSING NOTE
"Chief Complaint   Patient presents with     Prenatal Care     26w6d       Initial /56 (BP Location: Left arm, Patient Position: Chair, Cuff Size: Adult Regular)   Ht 1.702 m (5' 7\")   Wt 71.2 kg (157 lb)   LMP  (LMP Unknown)   BMI 24.59 kg/m   Estimated body mass index is 24.59 kg/m  as calculated from the following:    Height as of this encounter: 1.702 m (5' 7\").    Weight as of this encounter: 71.2 kg (157 lb).  Medication Reconciliation: complete    Lilo Win LPN    "

## 2019-01-03 NOTE — PATIENT INSTRUCTIONS
Return to office in one week for prenatal care and as needed.    Thank you for allowing Bob HARP CNM and our OB team to participate in your care.  If you have a scheduling or an appointment question please contact [facility] Unit Coordinator at their direct line [phone].   ALL nursing questions or concerns can be directed to your OB nurse at: [phone] Erin Nagy/Anne Marie

## 2019-01-09 ENCOUNTER — PRENATAL OFFICE VISIT (OUTPATIENT)
Dept: OBGYN | Facility: OTHER | Age: 29
End: 2019-01-09
Attending: ADVANCED PRACTICE MIDWIFE
Payer: COMMERCIAL

## 2019-01-09 VITALS — SYSTOLIC BLOOD PRESSURE: 100 MMHG | WEIGHT: 155 LBS | DIASTOLIC BLOOD PRESSURE: 56 MMHG | BODY MASS INDEX: 24.28 KG/M2

## 2019-01-09 DIAGNOSIS — Z34.83 ENCOUNTER FOR SUPERVISION OF OTHER NORMAL PREGNANCY, THIRD TRIMESTER: Primary | ICD-10-CM

## 2019-01-09 DIAGNOSIS — Z34.82 ENCOUNTER FOR SUPERVISION OF OTHER NORMAL PREGNANCY, SECOND TRIMESTER: ICD-10-CM

## 2019-01-09 DIAGNOSIS — Z23 NEED FOR TDAP VACCINATION: ICD-10-CM

## 2019-01-09 LAB
ALBUMIN UR-MCNC: NEGATIVE MG/DL
APPEARANCE UR: CLEAR
BACTERIA #/AREA URNS HPF: ABNORMAL /HPF
BILIRUB UR QL STRIP: NEGATIVE
COLOR UR AUTO: ABNORMAL
ERYTHROCYTE [DISTWIDTH] IN BLOOD BY AUTOMATED COUNT: 12 % (ref 10–15)
GLUCOSE 1H P 50 G GLC PO SERPL-MCNC: 98 MG/DL (ref 60–129)
GLUCOSE UR STRIP-MCNC: NEGATIVE MG/DL
HCT VFR BLD AUTO: 34.9 % (ref 35–47)
HGB BLD-MCNC: 11.9 G/DL (ref 11.7–15.7)
HGB UR QL STRIP: NEGATIVE
KETONES UR STRIP-MCNC: NEGATIVE MG/DL
LEUKOCYTE ESTERASE UR QL STRIP: NEGATIVE
MCH RBC QN AUTO: 30.3 PG (ref 26.5–33)
MCHC RBC AUTO-ENTMCNC: 34.1 G/DL (ref 31.5–36.5)
MCV RBC AUTO: 89 FL (ref 78–100)
MUCOUS THREADS #/AREA URNS LPF: PRESENT /LPF
NITRATE UR QL: NEGATIVE
PH UR STRIP: 6.5 PH (ref 4.7–8)
PLATELET # BLD AUTO: 240 10E9/L (ref 150–450)
RBC # BLD AUTO: 3.93 10E12/L (ref 3.8–5.2)
RBC #/AREA URNS AUTO: 1 /HPF (ref 0–2)
SOURCE: ABNORMAL
SP GR UR STRIP: 1.01 (ref 1–1.03)
UROBILINOGEN UR STRIP-MCNC: NORMAL MG/DL (ref 0–2)
WBC # BLD AUTO: 11.6 10E9/L (ref 4–11)
WBC #/AREA URNS AUTO: 1 /HPF (ref 0–5)

## 2019-01-09 PROCEDURE — 82950 GLUCOSE TEST: CPT | Performed by: ADVANCED PRACTICE MIDWIFE

## 2019-01-09 PROCEDURE — 90715 TDAP VACCINE 7 YRS/> IM: CPT | Performed by: ADVANCED PRACTICE MIDWIFE

## 2019-01-09 PROCEDURE — 86780 TREPONEMA PALLIDUM: CPT | Mod: 90 | Performed by: ADVANCED PRACTICE MIDWIFE

## 2019-01-09 PROCEDURE — 90471 IMMUNIZATION ADMIN: CPT | Performed by: ADVANCED PRACTICE MIDWIFE

## 2019-01-09 PROCEDURE — 85027 COMPLETE CBC AUTOMATED: CPT | Performed by: ADVANCED PRACTICE MIDWIFE

## 2019-01-09 PROCEDURE — 99207 ZZC PRENATAL VISIT: CPT | Mod: 25 | Performed by: ADVANCED PRACTICE MIDWIFE

## 2019-01-09 PROCEDURE — 99000 SPECIMEN HANDLING OFFICE-LAB: CPT | Performed by: ADVANCED PRACTICE MIDWIFE

## 2019-01-09 PROCEDURE — 36415 COLL VENOUS BLD VENIPUNCTURE: CPT | Performed by: ADVANCED PRACTICE MIDWIFE

## 2019-01-09 PROCEDURE — 81001 URINALYSIS AUTO W/SCOPE: CPT | Performed by: ADVANCED PRACTICE MIDWIFE

## 2019-01-09 ASSESSMENT — PAIN SCALES - GENERAL: PAINLEVEL: NO PAIN (0)

## 2019-01-09 NOTE — PROGRESS NOTES
28 Week Visit    Patient education provided on the following:  Effective positioning and latch techniques  Importance of early initiation of breastfeeding  Importance of rooming-in on a 24-hour basis    We reviewed the [place] [employer] Prenatal Toolkit in the [facility] Resource Book.  Patient questions and concerns addressed and reviewed. Support and encouragement provided.

## 2019-01-09 NOTE — PROGRESS NOTES
Doing well.  Baby active.   Denies contractions, bleeding, or leakage of fluid.     Discussed:  GTT, 3rd labs, UA, Tdap, kick count  Anticipatory Guidance Worth care in hospital  Respiratory therapy called for all deliveries  Skin to skin  Immunizations/meds   -Hepatitis B   -Erythromycin   -Vitamin K  Screening   -Hearing   -CCHD   -Bilirubin   -Metabolic  Rooming in  Feeding:  Breast  Car seats  Provider/appointments     Plan:  3rd tri labs, UA  1 hr GTT  Tdap  GBS @ 36 w    Return to office in 2 weeks for prenatal care and as needed.    KATIUSKA Overton, CNM

## 2019-01-10 ENCOUNTER — OFFICE VISIT (OUTPATIENT)
Dept: CHIROPRACTIC MEDICINE | Facility: OTHER | Age: 29
End: 2019-01-10
Attending: CHIROPRACTOR
Payer: COMMERCIAL

## 2019-01-10 DIAGNOSIS — M54.50 ACUTE BILATERAL LOW BACK PAIN WITHOUT SCIATICA: ICD-10-CM

## 2019-01-10 DIAGNOSIS — M99.01 SEGMENTAL AND SOMATIC DYSFUNCTION OF CERVICAL REGION: ICD-10-CM

## 2019-01-10 DIAGNOSIS — M99.03 SEGMENTAL AND SOMATIC DYSFUNCTION OF LUMBAR REGION: Primary | ICD-10-CM

## 2019-01-10 DIAGNOSIS — M99.02 SEGMENTAL AND SOMATIC DYSFUNCTION OF THORACIC REGION: ICD-10-CM

## 2019-01-10 PROCEDURE — 98941 CHIROPRACT MANJ 3-4 REGIONS: CPT | Mod: AT | Performed by: CHIROPRACTOR

## 2019-01-10 NOTE — PATIENT INSTRUCTIONS
Return to office in 2 weeks for prenatal care and as needed.    Thank you for allowing Bob HRAP CNM and our OB team to participate in your care.  If you have a scheduling or an appointment question please contact [facility] Unit Coordinator at their direct line [phone].   ALL nursing questions or concerns can be directed to your OB nurse at: [phone] Erin Nagy/Anne Marie

## 2019-01-10 NOTE — PROGRESS NOTES
Subjective Finding:    Chief compalint: Patient presents with:  Neck Pain  Back Pain  , Pain Scale: 7/10, Intensity: sharp, Duration: 1 weeks, Radiating: no.    Date of injury:     Activities that the pain restricts:   Home/household/hobbies/social activities: yes.  Work duties: yes.  Sleep: yes.  Makes symptoms better: rest.  Makes symptoms worse: activity.  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 Yes: MD.  Work related: no.  Automobile related injury: no.    Objective and Assessment:    Posture Analysis:   High shoulder: .  Head tilt: .  High iliac crest: .  Head carriage: forward.  Thoracic Kyphosis: neutral.  Lumbar Lordosis: forward.    Lumbar Range of Motion: extension decreased.  Cervical Range of Motion: extension decreased.  Thoracic Range of Motion: extension decreased.  Extremity Range of Motion: .    Palpation:   Quad lumb: bilateral, referred pain: no  Lev scapulae: dull pain, referred pain: no    Segmental dysfunction pre-treatment and treatment area: C3, C4, T5, L4 and L5.    Assessment post-treatment:  Cervical: ROM increased.  Thoracic: ROM increased.  Lumbar: ROM increased.    Comments: .      Complicating Factors: .    Procedure(s):  CMT:  75903 Chiropractic manipulative treatment 3-4 regions performed   Cervical: Diversified, See above for level, Supine, Thoracic: Diversified, See above for level, Prone and Lumbar: Diversified, See above for level, Side posture    Modalities:  None performed this visit    Therapeutic procedures:  None    Plan:  Treatment plan: PRN.  Instructed patient: stretch as instructed at visit.  Short term goals: reduce pain.  Long term goals: restore normal function.  Prognosis: excellent.

## 2019-01-11 LAB — T PALLIDUM AB SER QL: NONREACTIVE

## 2019-01-23 ENCOUNTER — PRENATAL OFFICE VISIT (OUTPATIENT)
Dept: OBGYN | Facility: OTHER | Age: 29
End: 2019-01-23
Attending: ADVANCED PRACTICE MIDWIFE
Payer: COMMERCIAL

## 2019-01-23 VITALS — SYSTOLIC BLOOD PRESSURE: 102 MMHG | BODY MASS INDEX: 25.22 KG/M2 | WEIGHT: 161 LBS | DIASTOLIC BLOOD PRESSURE: 52 MMHG

## 2019-01-23 DIAGNOSIS — Z34.83 ENCOUNTER FOR SUPERVISION OF OTHER NORMAL PREGNANCY, THIRD TRIMESTER: Primary | ICD-10-CM

## 2019-01-23 PROCEDURE — 99207 ZZC PRENATAL VISIT: CPT | Performed by: ADVANCED PRACTICE MIDWIFE

## 2019-01-23 ASSESSMENT — PAIN SCALES - GENERAL: PAINLEVEL: NO PAIN (0)

## 2019-01-23 NOTE — PATIENT INSTRUCTIONS
Return to office in 2 weeks for prenatal care and as needed.    Thank you for allowing Bob HARP CNM and our OB team to participate in your care.  If you have a scheduling or an appointment question please contact [facility] Unit Coordinator at their direct line [phone].   ALL nursing questions or concerns can be directed to your OB nurse at: [phone] Erin Nagy/Anne Marie

## 2019-01-23 NOTE — PROGRESS NOTES
Doing well.  Baby active.   Denies contractions, bleeding, or leakage of fluid.     Discussed:  EFW @ 38 weeks, kick count, plantar's wart    Plan:  EFW @ 38 w    Return to office in 2  weeks for prenatal care and as needed.    KATIUSKA Overton, CNM

## 2019-01-31 ENCOUNTER — OFFICE VISIT (OUTPATIENT)
Dept: CHIROPRACTIC MEDICINE | Facility: OTHER | Age: 29
End: 2019-01-31
Attending: CHIROPRACTOR
Payer: COMMERCIAL

## 2019-01-31 DIAGNOSIS — M99.02 SEGMENTAL AND SOMATIC DYSFUNCTION OF THORACIC REGION: ICD-10-CM

## 2019-01-31 DIAGNOSIS — M99.01 SEGMENTAL AND SOMATIC DYSFUNCTION OF CERVICAL REGION: ICD-10-CM

## 2019-01-31 DIAGNOSIS — M99.03 SEGMENTAL AND SOMATIC DYSFUNCTION OF LUMBAR REGION: Primary | ICD-10-CM

## 2019-01-31 DIAGNOSIS — M54.50 ACUTE BILATERAL LOW BACK PAIN WITHOUT SCIATICA: ICD-10-CM

## 2019-01-31 PROCEDURE — 98941 CHIROPRACT MANJ 3-4 REGIONS: CPT | Mod: AT | Performed by: CHIROPRACTOR

## 2019-02-06 ENCOUNTER — PRENATAL OFFICE VISIT (OUTPATIENT)
Dept: OBGYN | Facility: OTHER | Age: 29
End: 2019-02-06
Attending: ADVANCED PRACTICE MIDWIFE
Payer: COMMERCIAL

## 2019-02-06 VITALS
BODY MASS INDEX: 25.43 KG/M2 | WEIGHT: 162 LBS | HEIGHT: 67 IN | DIASTOLIC BLOOD PRESSURE: 62 MMHG | SYSTOLIC BLOOD PRESSURE: 110 MMHG

## 2019-02-06 DIAGNOSIS — Z34.83 ENCOUNTER FOR SUPERVISION OF OTHER NORMAL PREGNANCY, THIRD TRIMESTER: Primary | ICD-10-CM

## 2019-02-06 PROCEDURE — 99207 ZZC PRENATAL VISIT: CPT | Performed by: ADVANCED PRACTICE MIDWIFE

## 2019-02-06 ASSESSMENT — MIFFLIN-ST. JEOR: SCORE: 1487.46

## 2019-02-06 ASSESSMENT — PAIN SCALES - GENERAL: PAINLEVEL: MODERATE PAIN (4)

## 2019-02-06 NOTE — PATIENT INSTRUCTIONS
Return to office in 2 weeks for prenatal care and as needed.    Thank you for allowing Bob HARP CNM and our OB team to participate in your care.  If you have a scheduling or an appointment question please contact [facility] Unit Coordinator at their direct line [phone].   ALL nursing questions or concerns can be directed to your OB nurse at: [phone] Erin Nagy/Anne Marie

## 2019-02-06 NOTE — NURSING NOTE
"Chief Complaint   Patient presents with     Prenatal Care     31w6d       Initial /62 (BP Location: Left arm, Patient Position: Chair, Cuff Size: Adult Regular)   Ht 1.702 m (5' 7\")   Wt 73.5 kg (162 lb)   LMP  (LMP Unknown)   BMI 25.37 kg/m   Estimated body mass index is 25.37 kg/m  as calculated from the following:    Height as of this encounter: 1.702 m (5' 7\").    Weight as of this encounter: 73.5 kg (162 lb).  Medication Reconciliation: complete    Lilo Win LPN    "

## 2019-02-06 NOTE — PROGRESS NOTES
Doing well.  Baby active.   Denies contractions, bleeding, or leakage of fluid.     Discussed:  Right sided groin discomfort, verucose veins, PTL, PIH, kick counts     Plan:  Comfort cares for groin discomfort at this time  Monitor for worsening of verucose veins       Return to office in 2 weeks for prenatal care and as needed.    KATIUSKA Overton, CNM

## 2019-02-19 ENCOUNTER — OFFICE VISIT (OUTPATIENT)
Dept: CHIROPRACTIC MEDICINE | Facility: OTHER | Age: 29
End: 2019-02-19
Attending: CHIROPRACTOR
Payer: COMMERCIAL

## 2019-02-19 DIAGNOSIS — M99.01 SEGMENTAL AND SOMATIC DYSFUNCTION OF CERVICAL REGION: ICD-10-CM

## 2019-02-19 DIAGNOSIS — M99.03 SEGMENTAL AND SOMATIC DYSFUNCTION OF LUMBAR REGION: Primary | ICD-10-CM

## 2019-02-19 DIAGNOSIS — M99.02 SEGMENTAL AND SOMATIC DYSFUNCTION OF THORACIC REGION: ICD-10-CM

## 2019-02-19 DIAGNOSIS — M54.50 ACUTE BILATERAL LOW BACK PAIN WITHOUT SCIATICA: ICD-10-CM

## 2019-02-19 PROCEDURE — 98941 CHIROPRACT MANJ 3-4 REGIONS: CPT | Mod: AT | Performed by: CHIROPRACTOR

## 2019-02-19 NOTE — PROGRESS NOTES

## 2019-02-20 ENCOUNTER — PRENATAL OFFICE VISIT (OUTPATIENT)
Dept: OBGYN | Facility: OTHER | Age: 29
End: 2019-02-20
Attending: ADVANCED PRACTICE MIDWIFE
Payer: COMMERCIAL

## 2019-02-20 VITALS
HEIGHT: 67 IN | BODY MASS INDEX: 26.21 KG/M2 | WEIGHT: 167 LBS | SYSTOLIC BLOOD PRESSURE: 114 MMHG | DIASTOLIC BLOOD PRESSURE: 62 MMHG

## 2019-02-20 DIAGNOSIS — Z34.83 ENCOUNTER FOR SUPERVISION OF OTHER NORMAL PREGNANCY IN THIRD TRIMESTER: ICD-10-CM

## 2019-02-20 DIAGNOSIS — Z34.83 ENCOUNTER FOR SUPERVISION OF OTHER NORMAL PREGNANCY, THIRD TRIMESTER: Primary | ICD-10-CM

## 2019-02-20 PROCEDURE — 99207 ZZC PRENATAL VISIT: CPT | Performed by: ADVANCED PRACTICE MIDWIFE

## 2019-02-20 ASSESSMENT — PAIN SCALES - GENERAL: PAINLEVEL: MILD PAIN (2)

## 2019-02-20 ASSESSMENT — MIFFLIN-ST. JEOR: SCORE: 1510.14

## 2019-02-20 NOTE — PROGRESS NOTES
Doing well.  Baby active.   Denies contractions, bleeding, or leakage of fluid.     Discussed:  Fetal weight, 1 hr GTT, EFW at 38, kick count    Plan:  Next visit:   Repeat 1 hour GTT   GBS PCR   US for presentation and for fluid check  EFW @ 38 w    Return to office in 2 weeks for prenatal care and as needed.    KATIUSKA Overton, CNM

## 2019-02-20 NOTE — PATIENT INSTRUCTIONS
Return to office in 2 weeks for prenatal care and as needed.    Thank you for allowing Bob HARP CNM and our OB team to participate in your care.  If you have a scheduling or an appointment question please contact [facility] Unit Coordinator at their direct line [phone].   ALL nursing questions or concerns can be directed to your OB nurse at: [phone] Erin Nagy/Anne Marie

## 2019-02-20 NOTE — NURSING NOTE
"Chief Complaint   Patient presents with     Prenatal Care       Initial /62 (BP Location: Left arm, Patient Position: Chair, Cuff Size: Adult Regular)   Ht 1.702 m (5' 7\")   Wt 75.8 kg (167 lb)   LMP  (LMP Unknown)   BMI 26.16 kg/m   Estimated body mass index is 26.16 kg/m  as calculated from the following:    Height as of this encounter: 1.702 m (5' 7\").    Weight as of this encounter: 75.8 kg (167 lb).  Medication Reconciliation: complete    Lilo Win LPN    "

## 2019-02-23 ENCOUNTER — TELEPHONE (OUTPATIENT)
Dept: OBGYN | Facility: HOSPITAL | Age: 29
End: 2019-02-23

## 2019-02-23 ENCOUNTER — HOSPITAL ENCOUNTER (OUTPATIENT)
Facility: HOSPITAL | Age: 29
Discharge: HOME | End: 2019-02-23
Attending: ADVANCED PRACTICE MIDWIFE | Admitting: OBSTETRICS & GYNECOLOGY
Payer: COMMERCIAL

## 2019-02-23 VITALS
OXYGEN SATURATION: 96 % | HEIGHT: 67 IN | TEMPERATURE: 98.2 F | HEART RATE: 108 BPM | BODY MASS INDEX: 25.43 KG/M2 | WEIGHT: 162 LBS | RESPIRATION RATE: 20 BRPM | SYSTOLIC BLOOD PRESSURE: 130 MMHG | DIASTOLIC BLOOD PRESSURE: 60 MMHG

## 2019-02-23 PROBLEM — Z36.89 ENCOUNTER FOR TRIAGE IN PREGNANT PATIENT: Status: ACTIVE | Noted: 2019-02-23

## 2019-02-23 LAB
ALBUMIN UR-MCNC: 30 MG/DL
APPEARANCE UR: CLEAR
BACTERIA #/AREA URNS HPF: ABNORMAL /HPF
BILIRUB UR QL STRIP: NEGATIVE
COLOR UR AUTO: YELLOW
GLUCOSE UR STRIP-MCNC: 30 MG/DL
HGB UR QL STRIP: NEGATIVE
KETONES UR STRIP-MCNC: 40 MG/DL
LEUKOCYTE ESTERASE UR QL STRIP: ABNORMAL
MUCOUS THREADS #/AREA URNS LPF: PRESENT /LPF
NITRATE UR QL: NEGATIVE
PH UR STRIP: 6.5 PH (ref 4.7–8)
RBC #/AREA URNS AUTO: <1 /HPF (ref 0–2)
SOURCE: ABNORMAL
SP GR UR STRIP: 1.02 (ref 1–1.03)
SQUAMOUS #/AREA URNS AUTO: 4 /HPF (ref 0–1)
UROBILINOGEN UR STRIP-MCNC: NORMAL MG/DL (ref 0–2)
WBC #/AREA URNS AUTO: 11 /HPF (ref 0–5)

## 2019-02-23 PROCEDURE — 59025 FETAL NON-STRESS TEST: CPT

## 2019-02-23 PROCEDURE — 87086 URINE CULTURE/COLONY COUNT: CPT | Performed by: ADVANCED PRACTICE MIDWIFE

## 2019-02-23 PROCEDURE — G0463 HOSPITAL OUTPT CLINIC VISIT: HCPCS | Mod: 25

## 2019-02-23 PROCEDURE — 81001 URINALYSIS AUTO W/SCOPE: CPT | Performed by: ADVANCED PRACTICE MIDWIFE

## 2019-02-23 PROCEDURE — 59025 FETAL NON-STRESS TEST: CPT | Mod: 26 | Performed by: OBSTETRICS & GYNECOLOGY

## 2019-02-23 RX ORDER — ACETAMINOPHEN 325 MG/1
1000 TABLET ORAL EVERY 6 HOURS PRN
COMMUNITY
End: 2019-10-10

## 2019-02-23 ASSESSMENT — MIFFLIN-ST. JEOR: SCORE: 1487.46

## 2019-02-24 NOTE — DISCHARGE INSTRUCTIONS
Discharge Instructions for Undelivered Patients    Diet:  * Drink 8 to 12 glasses of liquids (milk, juice, water) every day  * You may eat meals and snacks.    Activity:  * Count fetal kicks every day.  * Call your doctor if your baby is moving less than usual.    Call your provider if you notice:  * Swelling in your face or increased swelling in your hands or legs.  * Headaches that are not relieved by Tylenol (acetaminophen).  * Changes in your vision (blurring; seeing spots or stars).  * Nausea (sick to your stomach) and vomiting (throwing up).  * Weight gain of 5 pounds per week.  * Heartburn that doesn't go away.  * Signs of bladder infection: Pain when you urinate (use the toilet), needing to go more often or more urgently.  * The bag of bennett (membrane) breaks, or you notice leaking in your underwear.  * Bright red blood in your underwear.  * Abdominal (lower belly) or stomach pain.  * Second (plus) baby: More than 6 contractions in an hour, come in.   * Increase or change in vaginal discharge (note the color and amount).        [facility]: [phone]

## 2019-02-24 NOTE — PLAN OF CARE
Johana Duff        NST:  reactive  Start:2230  Stop:2300    Physician: Spike  Reason For Test: Generalized weakness and pain  EDC:4/4/19  Gestational Age: 34 2/7        Chani Baugh

## 2019-02-24 NOTE — TELEPHONE ENCOUNTER
2019 8:40 PM  Johana Duff 1990 [phone] (home)   Pt of : No data on file.   Estimated Date of Delivery: 2019 34w2d    Patient Active Problem List   Diagnosis     Congenital pectus excavatum     Plantar fasciitis     Cervicalgia     Bilateral carpal tunnel syndrome      (spontaneous vaginal delivery)     Insufficient endocervical or transformation zone component in cervical specimen     Encounter for supervision of other normal pregnancy in third trimester         Spoke with Johana Duff   Pt lives 5 miles away.  Reason for call per pt  General body aches since this am, slight nausea and pelvic pain (which she has had for awhile with this pregnancy).    Are you having contractions? No  Are you having any bloody show: No  Are you leaking any fluids/ has your water broken? No  Is your baby moving normally: Yes  Did you have any complications with this or a previous pregnancy? No    Backache: Yes, new since today, same as body aches.  Pt sees the chiropractor every other week.    Pressure: No  Vaginal discharge? No  Cervical Status: not checked yet.     Patient advised: That she is welcome to come to the hospital and be checked PRN.  Pt stated she feels like she might be dehydrated and is going to try to orally hydrate herself for the next 30min-1hr and will then decide if she is going to come in to be evaluated in the ER or Urgent Care.  Pt informed she is more than welcome to come in and be evaluated if she chooses to, verbalized understanding.  Informed to call back with any further questions or concerns.     Call taken by Malena Dos Santos

## 2019-02-24 NOTE — PLAN OF CARE
OB Triage Note  Johana Duff  MRN: [id number]  Gestational Age: 34w2d      Johana Duff presents for generalized weakness, body aches, nausea and head ache (sign/symptom/concern).      Denies ctx, lof and bleeding at this time.   Dr. Lala notified of arrival and condition.  Oriented patient to surroundings. Call light within reach.         Plan:  -Initial NST, then fetal/uterine monitoring per MD/patient plan.  -Nursing education provided.

## 2019-02-24 NOTE — PLAN OF CARE
"Johana Duff is a 28 year old  and 34w2d patient came in complaining of total body aches    Patient Active Problem List   Diagnosis     Congenital pectus excavatum     Plantar fasciitis     Cervicalgia     Bilateral carpal tunnel syndrome      (spontaneous vaginal delivery)     Insufficient endocervical or transformation zone component in cervical specimen     Encounter for supervision of other normal pregnancy in third trimester     Encounter for triage in pregnant patient       Pt discharged to home at 11:20 PM and encouraged to rest and drink plenty of fluids.  Pt told to call/return if bleeding more than spotting, water breaks, contractions 3-5 minutes apart that she has to breath through.     Nursing education on dehydration provided. Self-management instructions reviewed. AVS given and signed. All questions answered and patient verbalizes understanding.    /60 (BP Location: Right arm)   Pulse 108   Temp 98.2  F (36.8  C) (Oral)   Resp 20   Ht 1.702 m (5' 7\")   Wt 73.5 kg (162 lb)   LMP  (LMP Unknown)   SpO2 96%   BMI 25.37 kg/m      Cervical status: not examined  Fetal Assessment: Reactive    Discharge support:  Family/Friend Support    Obstetric History       T1      L0     SAB0   TAB0   Ectopic0   Multiple0   Live Births0       # Outcome Date GA Lbr Kamlesh/2nd Weight Sex Delivery Anes PTL Lv   3 Current            2 Term 11/15/17 40w6d 07:28 / 02:30 4.18 kg (9 lb 3.4 oz) F Vag-Spont Local, TOPICAL N       Name: Caridad      Apgar1:  8                Apgar5: 9   1 AB 16 7w0d    AB, INEVITAB             Chani Baugh      "

## 2019-02-25 LAB
BACTERIA SPEC CULT: NORMAL
SPECIMEN SOURCE: NORMAL

## 2019-02-27 ENCOUNTER — OFFICE VISIT (OUTPATIENT)
Dept: CHIROPRACTIC MEDICINE | Facility: OTHER | Age: 29
End: 2019-02-27
Attending: CHIROPRACTOR
Payer: COMMERCIAL

## 2019-02-27 DIAGNOSIS — M99.02 SEGMENTAL AND SOMATIC DYSFUNCTION OF THORACIC REGION: ICD-10-CM

## 2019-02-27 DIAGNOSIS — M54.50 ACUTE BILATERAL LOW BACK PAIN WITHOUT SCIATICA: ICD-10-CM

## 2019-02-27 DIAGNOSIS — M99.03 SEGMENTAL AND SOMATIC DYSFUNCTION OF LUMBAR REGION: Primary | ICD-10-CM

## 2019-02-27 DIAGNOSIS — M99.01 SEGMENTAL AND SOMATIC DYSFUNCTION OF CERVICAL REGION: ICD-10-CM

## 2019-02-27 PROCEDURE — 98941 CHIROPRACT MANJ 3-4 REGIONS: CPT | Mod: AT | Performed by: CHIROPRACTOR

## 2019-02-27 NOTE — PROGRESS NOTES

## 2019-03-06 ENCOUNTER — PRENATAL OFFICE VISIT (OUTPATIENT)
Dept: OBGYN | Facility: OTHER | Age: 29
End: 2019-03-06
Attending: ADVANCED PRACTICE MIDWIFE
Payer: COMMERCIAL

## 2019-03-06 VITALS
WEIGHT: 168 LBS | HEIGHT: 67 IN | DIASTOLIC BLOOD PRESSURE: 60 MMHG | BODY MASS INDEX: 26.37 KG/M2 | SYSTOLIC BLOOD PRESSURE: 110 MMHG

## 2019-03-06 DIAGNOSIS — Z34.83 ENCOUNTER FOR SUPERVISION OF OTHER NORMAL PREGNANCY, THIRD TRIMESTER: Primary | ICD-10-CM

## 2019-03-06 DIAGNOSIS — Z34.83 ENCOUNTER FOR SUPERVISION OF OTHER NORMAL PREGNANCY, THIRD TRIMESTER: ICD-10-CM

## 2019-03-06 LAB — GLUCOSE 1H P 50 G GLC PO SERPL-MCNC: 91 MG/DL (ref 60–129)

## 2019-03-06 PROCEDURE — 82950 GLUCOSE TEST: CPT | Performed by: ADVANCED PRACTICE MIDWIFE

## 2019-03-06 PROCEDURE — 87653 STREP B DNA AMP PROBE: CPT | Performed by: ADVANCED PRACTICE MIDWIFE

## 2019-03-06 PROCEDURE — 99207 ZZC PRENATAL VISIT: CPT | Mod: 25 | Performed by: ADVANCED PRACTICE MIDWIFE

## 2019-03-06 PROCEDURE — 76815 OB US LIMITED FETUS(S): CPT | Performed by: ADVANCED PRACTICE MIDWIFE

## 2019-03-06 PROCEDURE — 36415 COLL VENOUS BLD VENIPUNCTURE: CPT | Performed by: ADVANCED PRACTICE MIDWIFE

## 2019-03-06 ASSESSMENT — MIFFLIN-ST. JEOR: SCORE: 1514.67

## 2019-03-06 ASSESSMENT — PAIN SCALES - GENERAL: PAINLEVEL: NO PAIN (0)

## 2019-03-06 NOTE — NURSING NOTE
"Chief Complaint   Patient presents with     Prenatal Care     35 6/7       Initial /60 (BP Location: Left arm, Patient Position: Sitting, Cuff Size: Adult Regular)   Ht 1.702 m (5' 7\")   Wt 76.2 kg (168 lb)   LMP  (LMP Unknown)   BMI 26.31 kg/m   Estimated body mass index is 26.31 kg/m  as calculated from the following:    Height as of this encounter: 1.702 m (5' 7\").    Weight as of this encounter: 76.2 kg (168 lb).  Medication Reconciliation: complete    LEE PIERRE LPN  "

## 2019-03-06 NOTE — PROGRESS NOTES
Doing well.  Baby active.   Denies contractions, bleeding, or leakage of fluid.     Discussed:  EFW at 38 weeks, US, GBS screening, kick count    Plan:  Repeat 1 hour GTT  GBS PCR  US for presentation and fluid check  EFW at 38 weeks    Return to office in 1 weeks for prenatal care and as needed.    KATIUSKA Overton, CNM

## 2019-03-07 ENCOUNTER — OFFICE VISIT (OUTPATIENT)
Dept: CHIROPRACTIC MEDICINE | Facility: OTHER | Age: 29
End: 2019-03-07
Attending: CHIROPRACTOR
Payer: COMMERCIAL

## 2019-03-07 DIAGNOSIS — M99.02 SEGMENTAL AND SOMATIC DYSFUNCTION OF THORACIC REGION: ICD-10-CM

## 2019-03-07 DIAGNOSIS — M99.03 SEGMENTAL AND SOMATIC DYSFUNCTION OF LUMBAR REGION: Primary | ICD-10-CM

## 2019-03-07 DIAGNOSIS — M99.01 SEGMENTAL AND SOMATIC DYSFUNCTION OF CERVICAL REGION: ICD-10-CM

## 2019-03-07 DIAGNOSIS — M54.50 ACUTE BILATERAL LOW BACK PAIN WITHOUT SCIATICA: ICD-10-CM

## 2019-03-07 LAB
GP B STREP DNA SPEC QL NAA+PROBE: NEGATIVE
SPECIMEN SOURCE: NORMAL

## 2019-03-07 PROCEDURE — 98941 CHIROPRACT MANJ 3-4 REGIONS: CPT | Mod: AT | Performed by: CHIROPRACTOR

## 2019-03-07 NOTE — PATIENT INSTRUCTIONS
Return to office in one week for prenatal care and as needed.    Thank you for allowing Bob HARP CNM and our OB team to participate in your care.  If you have a scheduling or an appointment question please contact [facility] Unit Coordinator at their direct line [phone].   ALL nursing questions or concerns can be directed to your OB nurse at: [phone] Erin Nagy/Anne Marie

## 2019-03-08 NOTE — PROGRESS NOTES

## 2019-03-13 ENCOUNTER — PRENATAL OFFICE VISIT (OUTPATIENT)
Dept: OBGYN | Facility: OTHER | Age: 29
End: 2019-03-13
Attending: ADVANCED PRACTICE MIDWIFE
Payer: COMMERCIAL

## 2019-03-13 VITALS — SYSTOLIC BLOOD PRESSURE: 98 MMHG | DIASTOLIC BLOOD PRESSURE: 60 MMHG | WEIGHT: 169 LBS | BODY MASS INDEX: 26.47 KG/M2

## 2019-03-13 DIAGNOSIS — Z34.83 ENCOUNTER FOR SUPERVISION OF OTHER NORMAL PREGNANCY, THIRD TRIMESTER: Primary | ICD-10-CM

## 2019-03-13 DIAGNOSIS — Z34.83 ENCOUNTER FOR SUPERVISION OF OTHER NORMAL PREGNANCY IN THIRD TRIMESTER: ICD-10-CM

## 2019-03-13 PROCEDURE — 99207 ZZC PRENATAL VISIT: CPT | Performed by: ADVANCED PRACTICE MIDWIFE

## 2019-03-13 ASSESSMENT — PAIN SCALES - GENERAL: PAINLEVEL: NO PAIN (0)

## 2019-03-13 NOTE — PATIENT INSTRUCTIONS
Return to office in 1 week(s) for prenatal care and as needed.    If you think your bag of water is broke; have bleeding like a period; think your in labor; or are worried about your baby's movement; please call the labor and delivery unit @ [phone].    Thank you for allowing Bob HARP CNM and our OB team to participate in your care.  If you have a scheduling or an appointment question please contact [facility] Unit Coordinator at their direct line [phone].   ALL nursing questions or concerns can be directed to your OB nurse at: [phone] Erin Nagy/Anne Marie

## 2019-03-13 NOTE — PROGRESS NOTES
Doing well.  Baby active.   Denies contractions, bleeding, or leakage of fluid.     Discussed:  hypno birthing tech (Pt reading a book), fetal monitoring in labor, kick count    Plan:  EFW @ 38 weeks    Return to office in 1 weeks for prenatal care and as needed.    KATIUSKA Overton, CNM

## 2019-03-14 ENCOUNTER — OFFICE VISIT (OUTPATIENT)
Dept: CHIROPRACTIC MEDICINE | Facility: OTHER | Age: 29
End: 2019-03-14
Attending: CHIROPRACTOR
Payer: COMMERCIAL

## 2019-03-14 DIAGNOSIS — M99.02 SEGMENTAL AND SOMATIC DYSFUNCTION OF THORACIC REGION: ICD-10-CM

## 2019-03-14 DIAGNOSIS — M99.03 SEGMENTAL AND SOMATIC DYSFUNCTION OF LUMBAR REGION: Primary | ICD-10-CM

## 2019-03-14 DIAGNOSIS — M54.50 ACUTE BILATERAL LOW BACK PAIN WITHOUT SCIATICA: ICD-10-CM

## 2019-03-14 DIAGNOSIS — M99.01 SEGMENTAL AND SOMATIC DYSFUNCTION OF CERVICAL REGION: ICD-10-CM

## 2019-03-14 PROCEDURE — 98941 CHIROPRACT MANJ 3-4 REGIONS: CPT | Mod: AT | Performed by: CHIROPRACTOR

## 2019-03-18 NOTE — PROGRESS NOTES
Subjective Finding:    Chief compalint: Patient presents with:  Back Pain: with pregnancy  Neck Pain  , Pain Scale: 7/10, Intensity: sharp, Duration: 1 weeks, Radiating: no.    Date of injury:     Activities that the pain restricts:   Home/household/hobbies/social activities: yes.  Work duties: yes.  Sleep: yes.  Makes symptoms better: rest.  Makes symptoms worse: activity.  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 Yes: MD.  Work related: no.  Automobile related injury: no.    Objective and Assessment:    Posture Analysis:   High shoulder: .  Head tilt: .  High iliac crest: .  Head carriage: forward.  Thoracic Kyphosis: neutral.  Lumbar Lordosis: forward.    Lumbar Range of Motion: extension decreased.  Cervical Range of Motion: extension decreased.  Thoracic Range of Motion: extension decreased.  Extremity Range of Motion: .    Palpation:   Quad lumb: bilateral, referred pain: no  Lev scapulae: dull pain, referred pain: no    Segmental dysfunction pre-treatment and treatment area: C3, C4, T5, L4 and L5.    Assessment post-treatment:  Cervical: ROM increased.  Thoracic: ROM increased.  Lumbar: ROM increased.    Comments: .      Complicating Factors: .    Procedure(s):  CMT:  90253 Chiropractic manipulative treatment 3-4 regions performed   Cervical: Diversified, See above for level, Supine, Thoracic: Diversified, See above for level, Prone and Lumbar: Diversified, See above for level, Side posture    Modalities:  None performed this visit    Therapeutic procedures:  None    Plan:  Treatment plan: PRN.  Instructed patient: stretch as instructed at visit.  Short term goals: reduce pain.  Long term goals: restore normal function.  Prognosis: excellent.

## 2019-03-20 ENCOUNTER — PRENATAL OFFICE VISIT (OUTPATIENT)
Dept: OBGYN | Facility: OTHER | Age: 29
End: 2019-03-20
Attending: ADVANCED PRACTICE MIDWIFE
Payer: COMMERCIAL

## 2019-03-20 ENCOUNTER — OFFICE VISIT (OUTPATIENT)
Dept: CHIROPRACTIC MEDICINE | Facility: OTHER | Age: 29
End: 2019-03-20
Attending: CHIROPRACTOR
Payer: COMMERCIAL

## 2019-03-20 VITALS
SYSTOLIC BLOOD PRESSURE: 108 MMHG | HEIGHT: 67 IN | BODY MASS INDEX: 26.84 KG/M2 | WEIGHT: 171 LBS | DIASTOLIC BLOOD PRESSURE: 56 MMHG

## 2019-03-20 DIAGNOSIS — M99.01 SEGMENTAL AND SOMATIC DYSFUNCTION OF CERVICAL REGION: ICD-10-CM

## 2019-03-20 DIAGNOSIS — M54.50 ACUTE BILATERAL LOW BACK PAIN WITHOUT SCIATICA: ICD-10-CM

## 2019-03-20 DIAGNOSIS — Z34.83 ENCOUNTER FOR SUPERVISION OF OTHER NORMAL PREGNANCY, THIRD TRIMESTER: Primary | ICD-10-CM

## 2019-03-20 DIAGNOSIS — M99.03 SEGMENTAL AND SOMATIC DYSFUNCTION OF LUMBAR REGION: Primary | ICD-10-CM

## 2019-03-20 DIAGNOSIS — M99.02 SEGMENTAL AND SOMATIC DYSFUNCTION OF THORACIC REGION: ICD-10-CM

## 2019-03-20 PROCEDURE — 99207 ZZC PRENATAL VISIT: CPT | Performed by: ADVANCED PRACTICE MIDWIFE

## 2019-03-20 PROCEDURE — 98941 CHIROPRACT MANJ 3-4 REGIONS: CPT | Mod: AT | Performed by: CHIROPRACTOR

## 2019-03-20 ASSESSMENT — MIFFLIN-ST. JEOR: SCORE: 1528.28

## 2019-03-20 ASSESSMENT — PAIN SCALES - GENERAL: PAINLEVEL: NO PAIN (0)

## 2019-03-20 NOTE — PROGRESS NOTES
Doing well.  Baby active.   Denies bleeding, or leakage of fluid. Mild contractions a couple of times    Discussed:  Labor, breast pump, post dates, kick count    Plan:  EFW tomorrow    Return to office in 1 weeks for prenatal care and as needed.    KATIUSKA Overton, CNM

## 2019-03-20 NOTE — PATIENT INSTRUCTIONS
Return to office in 1 week(s) for prenatal care and as needed.    If you think your bag of water is broke; have bleeding like a period; think your in labor; or are worried about your baby's movement; please call the labor and delivery unit @ [phone].    Thank you for allowing Bob HARP CNM and our OB team to participate in your care.  If you have a scheduling or an appointment question please contact [facility] Unit Coordinator at their direct line [phone].   ALL nursing questions or concerns can be directed to your OB nurse at: [phone] Erin Nagy/Anne Marie

## 2019-03-20 NOTE — PROGRESS NOTES

## 2019-03-21 ENCOUNTER — HOSPITAL ENCOUNTER (OUTPATIENT)
Dept: ULTRASOUND IMAGING | Facility: HOSPITAL | Age: 29
Discharge: HOME | End: 2019-03-21
Attending: ADVANCED PRACTICE MIDWIFE | Admitting: ADVANCED PRACTICE MIDWIFE
Payer: COMMERCIAL

## 2019-03-21 DIAGNOSIS — Z34.83 ENCOUNTER FOR SUPERVISION OF OTHER NORMAL PREGNANCY, THIRD TRIMESTER: ICD-10-CM

## 2019-03-21 PROCEDURE — 76816 OB US FOLLOW-UP PER FETUS: CPT | Mod: TC

## 2019-03-27 ENCOUNTER — PRENATAL OFFICE VISIT (OUTPATIENT)
Dept: OBGYN | Facility: OTHER | Age: 29
End: 2019-03-27
Attending: ADVANCED PRACTICE MIDWIFE
Payer: COMMERCIAL

## 2019-03-27 VITALS — SYSTOLIC BLOOD PRESSURE: 106 MMHG | BODY MASS INDEX: 27.1 KG/M2 | WEIGHT: 173 LBS | DIASTOLIC BLOOD PRESSURE: 66 MMHG

## 2019-03-27 DIAGNOSIS — Z34.83 ENCOUNTER FOR SUPERVISION OF OTHER NORMAL PREGNANCY IN THIRD TRIMESTER: ICD-10-CM

## 2019-03-27 DIAGNOSIS — O09.893 SUPERVISION OF OTHER HIGH RISK PREGNANCIES, THIRD TRIMESTER: Primary | ICD-10-CM

## 2019-03-27 PROCEDURE — 99207 ZZC PRENATAL VISIT: CPT | Performed by: ADVANCED PRACTICE MIDWIFE

## 2019-03-27 ASSESSMENT — PAIN SCALES - GENERAL: PAINLEVEL: NO PAIN (0)

## 2019-03-27 NOTE — PATIENT INSTRUCTIONS
Return to office in 1 week(s) for prenatal care and as needed.    If you think your bag of water is broke; have bleeding like a period; think your in labor; or are worried about your baby's movement; please call the labor and delivery unit @ [phone].    Thank you for allowing Bob HARP CNM and our OB team to participate in your care.  If you have a scheduling or an appointment question please contact [facility] Unit Coordinator at their direct line [phone].   ALL nursing questions or concerns can be directed to your OB nurse at: [phone] Erin Nagy/Anne Marie

## 2019-03-27 NOTE — PROGRESS NOTES
Doing well.  Baby active.   Denies bleeding, or leakage of fluid. Some contractions    Discussed:  Request for female provider, explained OB coverage, kick count    Plan:  Dr. Marquez to cover if CNM not available.    Return to office in 1 weeks for prenatal care and as needed.    KATIUSKA Overton, CNM

## 2019-03-28 ENCOUNTER — OFFICE VISIT (OUTPATIENT)
Dept: CHIROPRACTIC MEDICINE | Facility: OTHER | Age: 29
End: 2019-03-28
Attending: CHIROPRACTOR
Payer: COMMERCIAL

## 2019-03-28 DIAGNOSIS — M99.02 SEGMENTAL AND SOMATIC DYSFUNCTION OF THORACIC REGION: ICD-10-CM

## 2019-03-28 DIAGNOSIS — M54.50 ACUTE BILATERAL LOW BACK PAIN WITHOUT SCIATICA: ICD-10-CM

## 2019-03-28 DIAGNOSIS — M99.01 SEGMENTAL AND SOMATIC DYSFUNCTION OF CERVICAL REGION: ICD-10-CM

## 2019-03-28 DIAGNOSIS — M99.03 SEGMENTAL AND SOMATIC DYSFUNCTION OF LUMBAR REGION: Primary | ICD-10-CM

## 2019-03-28 PROCEDURE — 98941 CHIROPRACT MANJ 3-4 REGIONS: CPT | Mod: AT | Performed by: CHIROPRACTOR

## 2019-03-29 NOTE — PROGRESS NOTES
Subjective Finding:    Chief compalint: Patient presents with:  Back Pain: with pregnancy  Neck Pain  , Pain Scale: 7/10, Intensity: sharp, Duration: 1 weeks, Radiating: no.    Date of injury:     Activities that the pain restricts:   Home/household/hobbies/social activities: yes.  Work duties: yes.  Sleep: yes.  Makes symptoms better: rest.  Makes symptoms worse: activity.  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 Yes: MD.  Work related: no.  Automobile related injury: no.    Objective and Assessment:    Posture Analysis:   High shoulder: .  Head tilt: .  High iliac crest: .  Head carriage: forward.  Thoracic Kyphosis: neutral.  Lumbar Lordosis: forward.    Lumbar Range of Motion: extension decreased.  Cervical Range of Motion: extension decreased.  Thoracic Range of Motion: extension decreased.  Extremity Range of Motion: .    Palpation:   Quad lumb: bilateral, referred pain: no  Lev scapulae: dull pain, referred pain: no    Segmental dysfunction pre-treatment and treatment area: C3, C4, T5, L4 and L5.    Assessment post-treatment:  Cervical: ROM increased.  Thoracic: ROM increased.  Lumbar: ROM increased.    Comments: .      Complicating Factors: .    Procedure(s):  CMT:  40317 Chiropractic manipulative treatment 3-4 regions performed   Cervical: Diversified, See above for level, Supine, Thoracic: Diversified, See above for level, Prone and Lumbar: Diversified, See above for level, Side posture    Modalities:  None performed this visit    Therapeutic procedures:  None    Plan:  Treatment plan: PRN.  Instructed patient: stretch as instructed at visit.  Short term goals: reduce pain.  Long term goals: restore normal function.  Prognosis: excellent.

## 2019-04-03 ENCOUNTER — PRENATAL OFFICE VISIT (OUTPATIENT)
Dept: OBGYN | Facility: OTHER | Age: 29
End: 2019-04-03
Attending: ADVANCED PRACTICE MIDWIFE
Payer: COMMERCIAL

## 2019-04-03 ENCOUNTER — OFFICE VISIT (OUTPATIENT)
Dept: CHIROPRACTIC MEDICINE | Facility: OTHER | Age: 29
End: 2019-04-03
Attending: CHIROPRACTOR
Payer: COMMERCIAL

## 2019-04-03 VITALS
BODY MASS INDEX: 27.47 KG/M2 | DIASTOLIC BLOOD PRESSURE: 60 MMHG | SYSTOLIC BLOOD PRESSURE: 108 MMHG | WEIGHT: 175 LBS | HEIGHT: 67 IN

## 2019-04-03 DIAGNOSIS — M99.01 SEGMENTAL AND SOMATIC DYSFUNCTION OF CERVICAL REGION: ICD-10-CM

## 2019-04-03 DIAGNOSIS — Z34.83 ENCOUNTER FOR SUPERVISION OF OTHER NORMAL PREGNANCY, THIRD TRIMESTER: Primary | ICD-10-CM

## 2019-04-03 DIAGNOSIS — M99.02 SEGMENTAL AND SOMATIC DYSFUNCTION OF THORACIC REGION: ICD-10-CM

## 2019-04-03 DIAGNOSIS — M54.50 ACUTE BILATERAL LOW BACK PAIN WITHOUT SCIATICA: ICD-10-CM

## 2019-04-03 DIAGNOSIS — M99.03 SEGMENTAL AND SOMATIC DYSFUNCTION OF LUMBAR REGION: Primary | ICD-10-CM

## 2019-04-03 PROCEDURE — 98941 CHIROPRACT MANJ 3-4 REGIONS: CPT | Mod: AT | Performed by: CHIROPRACTOR

## 2019-04-03 PROCEDURE — 99207 ZZC PRENATAL VISIT: CPT | Performed by: ADVANCED PRACTICE MIDWIFE

## 2019-04-03 ASSESSMENT — PAIN SCALES - GENERAL: PAINLEVEL: NO PAIN (0)

## 2019-04-03 ASSESSMENT — MIFFLIN-ST. JEOR: SCORE: 1546.42

## 2019-04-03 NOTE — PATIENT INSTRUCTIONS
Return to office in 1 week(s) for prenatal care and as needed.    If you think your bag of water is broke; have bleeding like a period; think your in labor; or are worried about your baby's movement; please call the labor and delivery unit @ [phone].    Thank you for allowing Bob HARP CNM and our OB team to participate in your care.  If you have a scheduling or an appointment question please contact [facility] Unit Coordinator at their direct line [phone].   ALL nursing questions or concerns can be directed to your OB nurse at: [phone] Erin Nagy/Anne Marie

## 2019-04-03 NOTE — NURSING NOTE
"Chief Complaint   Patient presents with     Prenatal Care     39w6d       Initial /60 (BP Location: Left arm, Patient Position: Chair, Cuff Size: Adult Regular)   Ht 1.702 m (5' 7\")   Wt 79.4 kg (175 lb)   LMP  (LMP Unknown)   BMI 27.41 kg/m   Estimated body mass index is 27.41 kg/m  as calculated from the following:    Height as of this encounter: 1.702 m (5' 7\").    Weight as of this encounter: 79.4 kg (175 lb).  Medication Reconciliation: complete    Lilo Win LPN    "

## 2019-04-03 NOTE — PROGRESS NOTES
Doing well.  Baby active.   Denies contractions, bleeding, or leakage of fluid.     Discussed:  IOL @ 41 weeks, kick counts    Plan:  Consider IOL on Thursday, 4/11/19    Return to office in 1 weeks for prenatal care and as needed.    KATIUSKA Overton, CNM

## 2019-04-03 NOTE — PROGRESS NOTES

## 2019-04-08 ENCOUNTER — HOSPITAL ENCOUNTER (INPATIENT)
Facility: HOSPITAL | Age: 29
LOS: 2 days | Discharge: HOME | End: 2019-04-10
Attending: ADVANCED PRACTICE MIDWIFE | Admitting: ADVANCED PRACTICE MIDWIFE
Payer: COMMERCIAL

## 2019-04-08 ENCOUNTER — TELEPHONE (OUTPATIENT)
Dept: OBGYN | Facility: HOSPITAL | Age: 29
End: 2019-04-08

## 2019-04-08 PROBLEM — O47.9 THREATENED LABOR: Status: ACTIVE | Noted: 2019-04-08

## 2019-04-08 LAB
ABO + RH BLD: NORMAL
ABO + RH BLD: NORMAL
BLD GP AB SCN SERPL QL: NORMAL
BLOOD BANK CMNT PATIENT-IMP: NORMAL
ERYTHROCYTE [DISTWIDTH] IN BLOOD BY AUTOMATED COUNT: 13.3 % (ref 10–15)
HCT VFR BLD AUTO: 34.3 % (ref 35–47)
HGB BLD-MCNC: 11.4 G/DL (ref 11.7–15.7)
MCH RBC QN AUTO: 28.4 PG (ref 26.5–33)
MCHC RBC AUTO-ENTMCNC: 33.2 G/DL (ref 31.5–36.5)
MCV RBC AUTO: 86 FL (ref 78–100)
PLATELET # BLD AUTO: 199 10E9/L (ref 150–450)
RBC # BLD AUTO: 4.01 10E12/L (ref 3.8–5.2)
SPECIMEN EXP DATE BLD: NORMAL
WBC # BLD AUTO: 12.7 10E9/L (ref 4–11)

## 2019-04-08 PROCEDURE — 25000132 ZZH RX MED GY IP 250 OP 250 PS 637: Performed by: ADVANCED PRACTICE MIDWIFE

## 2019-04-08 PROCEDURE — 86901 BLOOD TYPING SEROLOGIC RH(D): CPT | Performed by: ADVANCED PRACTICE MIDWIFE

## 2019-04-08 PROCEDURE — 86850 RBC ANTIBODY SCREEN: CPT | Performed by: ADVANCED PRACTICE MIDWIFE

## 2019-04-08 PROCEDURE — 0KQM0ZZ REPAIR PERINEUM MUSCLE, OPEN APPROACH: ICD-10-PCS | Performed by: ADVANCED PRACTICE MIDWIFE

## 2019-04-08 PROCEDURE — 36415 COLL VENOUS BLD VENIPUNCTURE: CPT | Performed by: ADVANCED PRACTICE MIDWIFE

## 2019-04-08 PROCEDURE — 25000125 ZZHC RX 250

## 2019-04-08 PROCEDURE — 86900 BLOOD TYPING SEROLOGIC ABO: CPT | Performed by: ADVANCED PRACTICE MIDWIFE

## 2019-04-08 PROCEDURE — 85027 COMPLETE CBC AUTOMATED: CPT | Performed by: ADVANCED PRACTICE MIDWIFE

## 2019-04-08 PROCEDURE — 0064U ANTB TP TOTAL&RPR IA QUAL: CPT | Performed by: ADVANCED PRACTICE MIDWIFE

## 2019-04-08 PROCEDURE — 25000125 ZZHC RX 250: Performed by: ADVANCED PRACTICE MIDWIFE

## 2019-04-08 PROCEDURE — 72200001 ZZH LABOR CARE VAGINAL DELIVERY SINGLE

## 2019-04-08 PROCEDURE — 12000000 ZZH R&B MED SURG/OB

## 2019-04-08 PROCEDURE — 59400 OBSTETRICAL CARE: CPT | Performed by: ADVANCED PRACTICE MIDWIFE

## 2019-04-08 PROCEDURE — 25000132 ZZH RX MED GY IP 250 OP 250 PS 637

## 2019-04-08 PROCEDURE — 10907ZC DRAINAGE OF AMNIOTIC FLUID, THERAPEUTIC FROM PRODUCTS OF CONCEPTION, VIA NATURAL OR ARTIFICIAL OPENING: ICD-10-PCS | Performed by: ADVANCED PRACTICE MIDWIFE

## 2019-04-08 RX ORDER — METHYLERGONOVINE MALEATE 0.2 MG/ML
200 INJECTION INTRAVENOUS
Status: DISCONTINUED | OUTPATIENT
Start: 2019-04-08 | End: 2019-04-08

## 2019-04-08 RX ORDER — ONDANSETRON 2 MG/ML
4 INJECTION INTRAMUSCULAR; INTRAVENOUS EVERY 6 HOURS PRN
Status: DISCONTINUED | OUTPATIENT
Start: 2019-04-08 | End: 2019-04-08

## 2019-04-08 RX ORDER — IBUPROFEN 800 MG/1
800 TABLET, FILM COATED ORAL
Status: COMPLETED | OUTPATIENT
Start: 2019-04-08 | End: 2019-04-08

## 2019-04-08 RX ORDER — LANOLIN 100 %
OINTMENT (GRAM) TOPICAL
Status: DISCONTINUED | OUTPATIENT
Start: 2019-04-08 | End: 2019-04-10 | Stop reason: HOSPADM

## 2019-04-08 RX ORDER — NALOXONE HYDROCHLORIDE 0.4 MG/ML
.1-.4 INJECTION, SOLUTION INTRAMUSCULAR; INTRAVENOUS; SUBCUTANEOUS
Status: DISCONTINUED | OUTPATIENT
Start: 2019-04-08 | End: 2019-04-08

## 2019-04-08 RX ORDER — FENTANYL CITRATE 50 UG/ML
50-100 INJECTION, SOLUTION INTRAMUSCULAR; INTRAVENOUS
Status: DISCONTINUED | OUTPATIENT
Start: 2019-04-08 | End: 2019-04-08

## 2019-04-08 RX ORDER — IBUPROFEN 800 MG/1
800 TABLET, FILM COATED ORAL EVERY 8 HOURS
Status: DISCONTINUED | OUTPATIENT
Start: 2019-04-08 | End: 2019-04-10 | Stop reason: HOSPADM

## 2019-04-08 RX ORDER — OXYTOCIN 10 [USP'U]/ML
10 INJECTION, SOLUTION INTRAMUSCULAR; INTRAVENOUS
Status: DISCONTINUED | OUTPATIENT
Start: 2019-04-08 | End: 2019-04-10 | Stop reason: HOSPADM

## 2019-04-08 RX ORDER — MISOPROSTOL 200 UG/1
200 TABLET ORAL
Status: COMPLETED | OUTPATIENT
Start: 2019-04-08 | End: 2019-04-08

## 2019-04-08 RX ORDER — LIDOCAINE 50 MG/G
OINTMENT TOPICAL
Status: COMPLETED
Start: 2019-04-08 | End: 2019-04-08

## 2019-04-08 RX ORDER — ACETAMINOPHEN 325 MG/1
650 TABLET ORAL EVERY 4 HOURS PRN
Status: DISCONTINUED | OUTPATIENT
Start: 2019-04-08 | End: 2019-04-08

## 2019-04-08 RX ORDER — OXYTOCIN 10 [USP'U]/ML
10 INJECTION, SOLUTION INTRAMUSCULAR; INTRAVENOUS
Status: DISCONTINUED | OUTPATIENT
Start: 2019-04-08 | End: 2019-04-08

## 2019-04-08 RX ORDER — OXYTOCIN/0.9 % SODIUM CHLORIDE 30/500 ML
340 PLASTIC BAG, INJECTION (ML) INTRAVENOUS CONTINUOUS PRN
Status: DISCONTINUED | OUTPATIENT
Start: 2019-04-08 | End: 2019-04-10 | Stop reason: HOSPADM

## 2019-04-08 RX ORDER — OXYCODONE AND ACETAMINOPHEN 5; 325 MG/1; MG/1
1 TABLET ORAL
Status: DISCONTINUED | OUTPATIENT
Start: 2019-04-08 | End: 2019-04-08

## 2019-04-08 RX ORDER — CARBOPROST TROMETHAMINE 250 UG/ML
250 INJECTION, SOLUTION INTRAMUSCULAR
Status: DISCONTINUED | OUTPATIENT
Start: 2019-04-08 | End: 2019-04-08

## 2019-04-08 RX ORDER — OXYTOCIN/0.9 % SODIUM CHLORIDE 30/500 ML
100-340 PLASTIC BAG, INJECTION (ML) INTRAVENOUS CONTINUOUS PRN
Status: COMPLETED | OUTPATIENT
Start: 2019-04-08 | End: 2019-04-08

## 2019-04-08 RX ORDER — LIDOCAINE 50 MG/G
OINTMENT TOPICAL DAILY PRN
Status: DISCONTINUED | OUTPATIENT
Start: 2019-04-08 | End: 2019-04-10 | Stop reason: HOSPADM

## 2019-04-08 RX ORDER — ACETAMINOPHEN 325 MG/1
325-650 TABLET ORAL EVERY 4 HOURS PRN
Status: DISCONTINUED | OUTPATIENT
Start: 2019-04-08 | End: 2019-04-10 | Stop reason: HOSPADM

## 2019-04-08 RX ADMIN — ACETAMINOPHEN 650 MG: 325 TABLET, FILM COATED ORAL at 13:08

## 2019-04-08 RX ADMIN — OXYTOCIN-SODIUM CHLORIDE 0.9% IV SOLN 30 UNIT/500ML 340 ML/HR: 30-0.9/5 SOLUTION at 09:24

## 2019-04-08 RX ADMIN — IBUPROFEN 800 MG: 800 TABLET ORAL at 09:42

## 2019-04-08 RX ADMIN — MISOPROSTOL 800 MCG: 200 TABLET ORAL at 09:42

## 2019-04-08 RX ADMIN — BENZOCAINE AND LEVOMENTHOL: 200; 5 SPRAY TOPICAL at 17:29

## 2019-04-08 RX ADMIN — IBUPROFEN 800 MG: 800 TABLET ORAL at 17:28

## 2019-04-08 RX ADMIN — LIDOCAINE HYDROCHLORIDE 10 ML: 10; .005 INJECTION, SOLUTION EPIDURAL; INFILTRATION; INTRACAUDAL; PERINEURAL at 09:41

## 2019-04-08 RX ADMIN — MISOPROSTOL 200 MCG: 200 TABLET ORAL at 09:42

## 2019-04-08 RX ADMIN — LIDOCAINE: 50 OINTMENT TOPICAL at 10:22

## 2019-04-08 RX ADMIN — WITCH HAZEL: 500 SOLUTION RECTAL; TOPICAL at 17:28

## 2019-04-08 NOTE — TELEPHONE ENCOUNTER
Obstetric Phone Triage- HI    **REMEMBER: Review patient's prenatal record including complications with pregnancy and medications patient may be on (insulins, tocolytic, etc.)**      2019 6:16 AM  Johana Duff 1990   Home Phone [phone]   Mobile [phone]                        EDC 19 Gestational Age 40/4 weeks    Spoke with   Patient lives 5 miles away    Reason for call per pt:Wife has been having contractions    Is your provider aware of this concern? no        Are you having contractions? Yes   -If yes: Contractions frequency q 3-5 minutes and irregular    When did they start? 0300    Have you palpated your contractions? Yes    Are you having any bloody show/Bleeding? No      Are you having any new vaginal discharge or are you leaking fluids/has your water broken? no          Call taken by: Chani Baugh           Information: states they will be here in about 45 minutes.

## 2019-04-08 NOTE — H&P
ADMISSION NOTE FOR Johana Duff on 2019.    H and P unchanged from prenatal   Lungs: clear  Heart: RRR    Johana Duff is a 28 year old female  40w4d  Estimated Date of Delivery: 2019 is calculated from No LMP recorded (lmp unknown). Patient is pregnant. is [facility] on 2019 at 8:10 AM  in active labor.     Membranes are intact     PRENATAL COURSE   Prenatal vital signs WNL   Prenatal course was essentially uncomplicated     HISTORIES   Allergies   Allergen Reactions     Blue Dyes      Red Dye       Past Medical History:   Diagnosis Date     Ovarian cyst 2002    left      Past Surgical History:   Procedure Laterality Date     wisdom teeth extraction        Family History   Problem Relation Age of Onset     Cerebrovascular Disease Maternal Grandmother         complications     Hypertension Maternal Grandmother      Family History Negative Mother      Coronary Artery Disease Maternal Grandfather         s/p stents     Lung Cancer Maternal Grandfather         +tobacco     Family History Negative Father         lymes     Family History Negative Sister      Emphysema Paternal Grandmother         +tobacco     Family History Negative Sister      Family History Negative Sister      Emphysema Paternal Grandfather         +tobacco      Social History     Tobacco Use     Smoking status: Never Smoker     Smokeless tobacco: Never Used   Substance Use Topics     Alcohol use: Yes     Comment: 3/year      OB History    Para Term  AB Living   3 1 1 0 1 0   SAB TAB Ectopic Multiple Live Births   0 0 0 0 0      # Outcome Date GA Lbr Kamlesh/2nd Weight Sex Delivery Anes PTL Lv   3 Current            2 Term 11/15/17 40w6d 07:28 / 02:30 4.18 kg (9 lb 3.4 oz) F Vag-Spont Local, TOPICAL N       Name: Caridad      Apgar1: 8  Apgar5: 9   1 AB 16 7w0d    AB, INEVITAB           LABS:     Lab Results   Component Value Date    ABO B 10/01/2018           Lab Results   Component  Value Date    RH Pos 10/01/2018      Rhogam not indicated   Lab Results   Component Value Date    RUBELLAABIGG 31 09/16/2013      No results found for: RPR   No results found for: HIV   Lab Results   Component Value Date    HGB 11.4 04/08/2019      Lab Results   Component Value Date    HEPBANG Nonreactive 10/01/2018      Lab Results   Component Value Date    GBS Negative 03/06/2019      Other significant lab:    None.     PHYSICAL EXAM:     LMP  (LMP Unknown)   Neuro: denies headache and visual disturbances   Edema none Reflexes +1     Abdomen: gravid, single vertex fetus, non-tender, Estimated Fetal Weight: 8 lb     FETAL HEART TONES Cat I  Cervix 8+    ASSESSMENT:   IUP @ 40w4d in active labor.  NST not done. Transitioning on admit.  Moving around.      PLAN:   Prepare for delivery     KATIUSKA Overton, BLADIMIR

## 2019-04-08 NOTE — PLAN OF CARE
Labor Admission  Johana Duff  MRN: [id number]  Gestational Age: 40w4d      Johana Duff is admitted for active labor.  States tasha since 0200. No vaginal bleeding or leaking of fluid. Reports babe moving normally. No complications with pregnancy or previous delivery.    Bob Riggs notified of arrival and condition and Intrapartum orders initiated.      FHT: 145  NST: Appropriate for Gestational Age .  Uterine Assessment: per pt every 3-5 min     Patient is alert and oriented X 3,Patient oriented to room, unit, hourly rounding, and plan of care.  Call light within reach. Explained admission packet with patient bill of rights brochure. Will continue to monitor and document as needed.     Inpatient nursing criteria listed below was met:    Health care directives status obtained and documented: Yes  Patient identifies a surrogate decision maker: Yes   If yes, who:Nickie Contact Information:in chart  Core Measure diagnosis present:: Yes  Vaccine assessment done and vaccines ordered if appropriate. No  Clergy visit ordered if patient requests: N/A  Skin issues/needs documented:N/A  Isolation needs addressed, if appropriate: N/A  Fall Prevention (Med and High risk): Care plan updated, Education given and documented and signage used: N/A  Care Plan initiated: Yes  Education Documented (Reminder to educate patient if MRSA is present on admission): Yes  Education Assessment documented:Yes  Patient has discharge needs (If yes, please explain): Yes

## 2019-04-08 NOTE — L&D DELIVERY NOTE
Delivery Summary    Johana Duff MRN# [id number]   Age: 28 year old YOB: 1990     ASSESSMENT & PLAN:   .  Small 2nd degree with repair       Labor Event Times    Labor onset date:  19 Onset time:   3:00 AM   Dilation complete date:  19 Complete time:   8:53 AM   Start pushing date/time:  2019 0853      Labor Length    1st Stage (hrs):  5 (min):  53   2nd Stage (hrs):  0 (min):  31   3rd Stage (hrs):  0 (min):  12      Labor Events     labor?:  No   steroids:  None  Labor Type:  Spontaneous     Antibiotics received during labor?:  No     Rupture date/time: 19 0832   Rupture type:  Artificial Rupture of Membranes  Fluid color:  Clear  Fluid odor:  Normal     1:1 continuous labor support provided by?:  RN       Delivery/Placenta Date and Time    Delivery Date:  19 Delivery Time:   9:24 AM   Placenta Date/Time:  2019  9:36 AM  Oxytocin given at the time of delivery:  after delivery of baby     Vaginal Counts     Initial count performed by 2 team members:   Two Team Members   jean mayfield RN       Needles Suture [place] Sponges Instruments   Initial counts 1 0 14 8   Added to count  1     Final counts 1 1 14 8   Placed during labor Accounted for at the end of labor   No No   No No   No No    Final count performed by 2 team members:   Two Team Members   Sonal ATKINS      Final count correct?:  Yes     Apgars    Living status:  Living   1 Minute 5 Minute 10 Minute 15 Minute 20 Minute   Skin color: 1  1       Heart rate: 2  2       Reflex irritability: 2  2       Muscle tone: 2  2       Respiratory effort: 2  2       Total: 9  9       Apgars assigned by:  SONAL GREGORY     Cord    Vessels:  3 Vessels Complications:  None   Cord Blood Disposition:  Lab Gases Sent?:  No      [place] Resuscitation    Methods:  None   Care at Delivery:  Vaginal Delivery Note   of viable Female.  Nursery JAZ GREGORY present.  Infant with spontaneous cry, to mother's  "abdomen, dried and stimulated. Mary cares provided.  Mother and baby in stable condition. Baby skin-to-skin with mom. ID bands placed on infant, mom and father.   Output in Delivery Room:  Stool     [place] Measurements    Weight:  8 lb 5.5 oz Length:  1' 8\"      Skin to Skin and Feeding Plan    Skin to skin initiation date/time: 1841    Skin to skin with:  Mother  Skin to skin end date/time:     Breastfeeding initiated date/time:  2019 0952  How do you plan to feed your baby:  Breastfeeding     Labor Events and Shoulder Dystocia    Fetal Tracing Prior to Delivery:  Category 1  Fetal Tracing Comments:  Intermitent monitoring     Delivery (Maternal) (Provider to Complete) (912508)    Episiotomy:  None  Perineal lacerations:  2nd Repaired?:  Yes   Vaginal laceration?:  No    Cervical laceration?:  No    Est. blood loss (mL):  300  Number of repair packets:  1     Blood Loss  Mother: Johana Duff #[id number]   Start of Mother's Information    IO Blood Loss  19 0300 - 19 1139    EBL (mL) Hospital Encounter 300 mL    Total  300 mL         End of Mother's Information  Mother: Johana Duff #[id number]         Delivery - Provider to Complete (919715)    Delivering clinician:  Bob Riggs APRN CNM  Attempted Delivery Types (Choose all that apply):  Spontaneous Vaginal Delivery  Delivery Type (Choose the 1 that will go to the Birth History):  Vaginal, Spontaneous          Placenta    Delayed Cord Clamping:  Done  Date/Time:  2019  9:36 AM  Removal:  Spontaneous  Disposition:  Hospital disposal     Anesthesia    Method:  None          Presentation and Position    Presentation:  Vertex  Position:  Middle Occiput Anterior       Ember Beverly  8lb 5.5 oz    KATIUSKA Gutierres CNM  "

## 2019-04-08 NOTE — PLAN OF CARE
Pt up to bathroom to void. Able to void large amt of urine without difficulty. Tolerated well. Denies dizziness, ringing in ears, light headedness, visual changes or nausea. IV saline lock removed. Pt up to shower. Bed linens changed and analy cares completed per pt. Tolerated well. Will continue to monitor.

## 2019-04-08 NOTE — PLAN OF CARE
Viable babe girl born via  with Bob Riggs in attendance. Babe placed skin to skin with mom immediately. Dried and stimulated. Babe blue. Hat placed and bulb suctioned. Babe pinking up with acrocyanosis noted. ANDRES freely and lusty cry present. New dry blankets placed. Lusty cry noted. Apgas . Babe skin to skin with mom.

## 2019-04-08 NOTE — PLAN OF CARE
Provider:Bob Riggs  Nurse:Janelle Garcia RN    Procedure:  EBL:300 mL  Count correct?:Yes  Specimens?:Yes  Sent to lab?:Yes    What went well? Maternal changing of positions in labor  What could be done better? nothing  Any postpartum concerns? nothing

## 2019-04-08 NOTE — PLAN OF CARE
VSS and afebrile. Assessments completed as charted. Fundus firm U/1, lochia moderate amt with no large clots noted. Perineum well-approximated with no s/s of infection noted. Swelling present. Encouraged to soak, use ice pack, tucks and Dermoplast. Mom up independently in room. Babe rooming in and bonding well. States pain 0 (No pain). Reports relief from Ibuprofen. Denies any needs or concerns at this time. Will continue to monitor.

## 2019-04-08 NOTE — PROGRESS NOTES
Cervix:  8-9  Fetal Heart Rate Tracing: Intermittent  Contractions  Every 1-3 minutes  Comfort level Uncomfortable    Assessment:   Progressing well  Clear fluid    Plan:   AROM  Prepare for delivery    KATIUSKA Overton, PHUM

## 2019-04-09 LAB
HGB BLD-MCNC: 10.5 G/DL (ref 11.7–15.7)
T PALLIDUM AB SER QL: NONREACTIVE

## 2019-04-09 PROCEDURE — 12000000 ZZH R&B MED SURG/OB

## 2019-04-09 PROCEDURE — 85018 HEMOGLOBIN: CPT | Performed by: ADVANCED PRACTICE MIDWIFE

## 2019-04-09 PROCEDURE — 25000132 ZZH RX MED GY IP 250 OP 250 PS 637: Performed by: ADVANCED PRACTICE MIDWIFE

## 2019-04-09 PROCEDURE — 36415 COLL VENOUS BLD VENIPUNCTURE: CPT | Performed by: ADVANCED PRACTICE MIDWIFE

## 2019-04-09 RX ADMIN — ACETAMINOPHEN 650 MG: 325 TABLET, FILM COATED ORAL at 07:13

## 2019-04-09 RX ADMIN — ACETAMINOPHEN 650 MG: 325 TABLET, FILM COATED ORAL at 15:00

## 2019-04-09 RX ADMIN — IBUPROFEN 800 MG: 800 TABLET ORAL at 08:55

## 2019-04-09 RX ADMIN — IBUPROFEN 800 MG: 800 TABLET ORAL at 01:27

## 2019-04-09 RX ADMIN — IBUPROFEN 800 MG: 800 TABLET ORAL at 17:09

## 2019-04-09 NOTE — PLAN OF CARE
Face to face report given with opportunity to observe patient.    Report given to JAZ Luis   4/9/2019  6:59 AM

## 2019-04-09 NOTE — LACTATION NOTE
"Initial Lactation Consultation    Johana Duff                                                                                                    [id number]    Consultation Date: 2019    Reason for Lactation Referral:routine lactation assessment.    MATERNAL HISTORY   Maternal History: Vaginal delivery, 2nd baby  History of Breast Surgery: No  Breast Changes During Pregnancy: Yes  Breast Feeding History: Yes,  successful, Length of Time: until she became pregnant, about 7 months  Maternal Meds: see eMar    MATERNAL ASSESSMENT    Breast Size: average  Nipple Appearance - Left: intact  Nipple Appearance - Right: intact  Nipple Erectility - Left: erect with stimulation  Nipple Erectility - Right: erect with stimulation  Areolas Compressibility: soft  Nipple Size: average  Milk Supply: colostrum    INFANT ASSESSMENT    Oral Anatomy  Mouth: normal  Palate: normal  Jaw: normal    FEEDING   Feeding Time:did not see a feeding  Position: NA  Effort to Latch: did not nurse  Duration of Breast Feeding: NA  Results: excellent breast feed per nurse and mom    FEEDING PLAN    Inpatient Feeding Plan: Nurse on demand, responding to infant's feeding cues. Snuggle in skin-to-skin to learn positioning and infant cues. Rooming-in encouraged.    LACTATION COMMENTS   Anticipatory guidance provided in regard to \"baby's second night.\"     Deep latch explained for proper positioning of breast in infant's mouth, maximizing milk transfer and comfort.   signs of satiety reviewed.  \"Ways to know that baby is getting enough\" discussed thoroughly.  Follow-up support information provided.        __________________________________________________________________________________  SOLEDAD SLOAN RN, IBCLC  2019      "

## 2019-04-09 NOTE — PLAN OF CARE
VSS and afebrile. Assessments completed as charted. Fundus firm U/1, lochia scant with no large clots noted. Perineum well-approximated with no s/s of infection noted. Mom up independently in room. Babe rooming in and bonding well. States pain 0 (No pain). Reports relief from Ibuprofen and Tylenol. Denies any needs or concerns at this time.

## 2019-04-09 NOTE — PROGRESS NOTES
[facility]   Obstetrics PostPartum  / Progress Note  Post Partum day 1   Subjective:  PP, , Small 2nd degree with repair   Breast feeding: y          Medications:   All medications related to the patient's hospitalization have been reviewed           Physical Exam:   Awake alert with appropriate affect    /56   Pulse 78   Temp 98.9  F (37.2  C) (Oral)   Resp 16   LMP  (LMP Unknown)   SpO2 98%   Breastfeeding? Unknown     Fundus : firm   Legs: negative Elizabeth's              Data:   All laboratory data related to this ob reviewed    Hemoglobin   Date Value Ref Range Status   2019 10.5 (L) 11.7 - 15.7 g/dL Final   ]  Lab Results   Component Value Date    ABO B 2019      Lab Results   Component Value Date    RH Pos 2019            Assessment and Plan:    Assessment:   PP day 1   with 2nd degree tear with repair  BF          Plan:   Plan for discharge later today or tomorrow               KATIUSKA Overton, PHUM

## 2019-04-10 VITALS
TEMPERATURE: 97.6 F | HEART RATE: 73 BPM | SYSTOLIC BLOOD PRESSURE: 106 MMHG | DIASTOLIC BLOOD PRESSURE: 58 MMHG | RESPIRATION RATE: 16 BRPM | OXYGEN SATURATION: 98 %

## 2019-04-10 NOTE — DISCHARGE SUMMARY
[facility]   Obstetrics PostPartum  / Discharge Note  Post Partum day 2   Subjective:  PP,    Breast feeding: y          Medications:   All medications related to the patient's hospitalization have been reviewed           Physical Exam:   Awake alert with appropriate affect    /53   Pulse 69   Temp 98.3  F (36.8  C) (Oral)   Resp 16   LMP  (LMP Unknown)   SpO2 98%   Breastfeeding? Unknown     Fundus : firm   Legs: negative Elizabeth's              Data:   All laboratory data related to this ob reviewed    Hemoglobin   Date Value Ref Range Status   2019 10.5 (L) 11.7 - 15.7 g/dL Final   ]  Lab Results   Component Value Date    ABO B 2019      Lab Results   Component Value Date    RH Pos 2019            Assessment and Plan:    Assessment:   PP day 2    Small tear with repair  BF          Plan:   Discharge to home  Follow up in 2 weeks               KATIUSKA Overton, CNM

## 2019-04-10 NOTE — PLAN OF CARE
Assessments completed as charted. B/P: 113/53, T: 98.3, P: 69, R: 16. Rates pain: 0/10 . Voiding without difficulty. Fundus: Midline U/2. Lochia: Light. Activity: moving with difficulty due to perineal pain. Infant feeding: Breast feeding going well.     LATCH Score:   Latch: 2 - Good Latch  Audible Swallowin - Spontaneous & frequent  Type of Nipple: (Breast/Nipple) 2 - Everted  Comfort: 0 - Engorged, cracked or severe pain  Hold: 1 - Min. Assist   Total LATCH Score: latch of 6-9 at times.    Postpartum breastfeeding assessment completed and education provided, see Patient Education Activity.  Items included in the education are:   proper positioning and latch  effectiveness of feeding  manual expression  handling and storing breastmilk  maintenance of breastfeeding for the first 6 months  sign/symptoms of infant feeding issues requiring referral to qualified health care provider  Postpartum care education provided, see Patient Education activity. Patient denies needs. Will monitor.  Anne Marie Mantilla

## 2019-04-10 NOTE — PLAN OF CARE
Pt packing up and getting ready to discharge home. Denies questions or concerns at this time. States she  babe and her breasts are soft after feeding. Denies pain. Appointments made. Will continue to monitor.

## 2019-04-10 NOTE — PLAN OF CARE
Patient discharged at 10:48 AM via ambulation accompanied by spouse and staff. Prescriptions - None ordered for discharge. All belongings sent with patient.     Discharge instructions reviewed with patient. Listed belongings gathered and returned to patient.     Patient discharged to home.     Core Measures and Vaccines  Core Measures applicable during stay: none  Pneumonia and Influenza given prior to discharge, if indicated: N/A    Surgical Patient   Surgical Procedures during stay: none  Did patient receive discharge instruction on wound care and recognition of infection symptoms? Yes    MISC  Follow up appointment made:  Yes  Home and hospital aquired medications returned to patient: Yes  Patient reports pain was well managed at discharge: Yes  Discharge instructions reviewed out of new family book.  Post partum depression, and shaken baby syndrome instructions reviewed and patient verbalizes understanding per new family book.

## 2019-04-10 NOTE — PROGRESS NOTES
Met with patient, who was holding her baby. She states she lives in a house in [place] with her spouse and their 16 month-old daughter and now the new baby. Her PCP is Dr. Floyd and she will have her as baby's PCP as well, and has seen Bob Riggs for OB throughout her pregnancy, all of [facility]. She states her pregnancy and delivery went well. She works as a  part-time and plans to return to work after her maternity leave. She drives and her spouse plans to transport on hospital discharge and has a car seat for baby. She states she has everything she needs for baby at home as well. She accepted resources for [facility] services, though she states she exceeded income guidelines previously but agrees to check into it again, as she has a larger family size now and will have different income guidelines to meet. She denies any education concerns with herself or baby. She denies any recent mood concerns or history of mental health issues, including post-partum depression. She plans to contact PCP if any mood concerns arise. She states she has a great support system, which includes her , friends, and other family. She accepted a list of community resources, including financial, legal, clinics, etc, in case any needs arise. She denies any other needs at this time.

## 2019-04-10 NOTE — PLAN OF CARE
Assessments completed as charted. B/P: 106/58, T: 97.6, P: 73, R: 16. Rates pain: 0/10 . Voiding without difficulty. Fundus: Midline u/2. Lochia: Light. Activity: unrestricted with out pain . Infant feeding: Breast feeding going well.     LATCH Score:   Latch: 2 - Good Latch  Audible Swallowin - Spontaneous & frequent  Type of Nipple: (Breast/Nipple) 2 - Everted  Comfort: 2 - Soft, Nontender, tender at times due to engorgement.  Hold: 2 - No Assist   Total LATCH Score:     Postpartum breastfeeding assessment completed and education provided, see Patient Education Activity.  Items included in the education are:   proper positioning and latch  effectiveness of feeding  manual expression  handling and storing breastmilk  maintenance of breastfeeding for the first 6 months  sign/symptoms of infant feeding issues requiring referral to qualified health care provider  Postpartum care education provided, see Patient Education activity. Patient denies needs. Will monitor.  Jania Acevedo

## 2019-04-15 ENCOUNTER — OFFICE VISIT (OUTPATIENT)
Dept: CHIROPRACTIC MEDICINE | Facility: OTHER | Age: 29
End: 2019-04-15
Attending: CHIROPRACTOR
Payer: COMMERCIAL

## 2019-04-15 DIAGNOSIS — M99.01 SEGMENTAL AND SOMATIC DYSFUNCTION OF CERVICAL REGION: ICD-10-CM

## 2019-04-15 DIAGNOSIS — M54.50 ACUTE BILATERAL LOW BACK PAIN WITHOUT SCIATICA: ICD-10-CM

## 2019-04-15 DIAGNOSIS — M99.03 SEGMENTAL AND SOMATIC DYSFUNCTION OF LUMBAR REGION: Primary | ICD-10-CM

## 2019-04-15 DIAGNOSIS — M99.02 SEGMENTAL AND SOMATIC DYSFUNCTION OF THORACIC REGION: ICD-10-CM

## 2019-04-15 PROCEDURE — 98941 CHIROPRACT MANJ 3-4 REGIONS: CPT | Mod: AT | Performed by: CHIROPRACTOR

## 2019-04-16 NOTE — PROGRESS NOTES
Subjective Finding:    Chief compalint: Patient presents with:  Back Pain: post delievery  Neck Pain  , Pain Scale: 7/10, Intensity: sharp, Duration: 1 weeks, Radiating: no.    Date of injury:     Activities that the pain restricts:   Home/household/hobbies/social activities: yes.  Work duties: yes.  Sleep: yes.  Makes symptoms better: rest.  Makes symptoms worse: activity.  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 Yes: MD.  Work related: no.  Automobile related injury: no.    Objective and Assessment:    Posture Analysis:   High shoulder: .  Head tilt: .  High iliac crest: .  Head carriage: forward.  Thoracic Kyphosis: neutral.  Lumbar Lordosis: forward.    Lumbar Range of Motion: extension decreased.  Cervical Range of Motion: extension decreased.  Thoracic Range of Motion: extension decreased.  Extremity Range of Motion: .    Palpation:   Quad lumb: bilateral, referred pain: no  Lev scapulae: dull pain, referred pain: no    Segmental dysfunction pre-treatment and treatment area: C3, C4, T5, L4 and L5.    Assessment post-treatment:  Cervical: ROM increased.  Thoracic: ROM increased.  Lumbar: ROM increased.    Comments: .  Post pregnancy    Complicating Factors: .    Procedure(s):  CMT:  13405 Chiropractic manipulative treatment 3-4 regions performed   Cervical: Diversified, See above for level, Supine, Thoracic: Diversified, See above for level, Prone and Lumbar: Diversified, See above for level, Side posture    Modalities:  None performed this visit    Therapeutic procedures:  None    Plan:  Treatment plan: PRN.  Instructed patient: stretch as instructed at visit.  Short term goals: reduce pain.  Long term goals: restore normal function.  Prognosis: excellent.

## 2019-04-22 ENCOUNTER — PRENATAL OFFICE VISIT (OUTPATIENT)
Dept: OBGYN | Facility: OTHER | Age: 29
End: 2019-04-22
Attending: NURSE PRACTITIONER
Payer: COMMERCIAL

## 2019-04-22 VITALS
BODY MASS INDEX: 24.01 KG/M2 | DIASTOLIC BLOOD PRESSURE: 58 MMHG | SYSTOLIC BLOOD PRESSURE: 108 MMHG | WEIGHT: 153 LBS | HEIGHT: 67 IN | HEART RATE: 78 BPM | OXYGEN SATURATION: 98 %

## 2019-04-22 PROCEDURE — 99207 ZZC NO CHARGE LOS: CPT | Performed by: ADVANCED PRACTICE MIDWIFE

## 2019-04-22 ASSESSMENT — PAIN SCALES - GENERAL: PAINLEVEL: NO PAIN (0)

## 2019-04-22 ASSESSMENT — MIFFLIN-ST. JEOR: SCORE: 1446.63

## 2019-04-22 NOTE — PATIENT INSTRUCTIONS
Return to office in 4 weeks for postpartum care and as needed.    Thank you for allowing Bob HARP CNM and our OB team to participate in your care.  If you have a scheduling or an appointment question please contact [facility] Unit Coordinator at their direct line [phone].   ALL nursing questions or concerns can be directed to your OB nurse at: [phone] Erin Nagy/Anne Marie

## 2019-04-22 NOTE — PROGRESS NOTES
"Johana Duff is a 28 year old female  /58 (BP Location: Left arm, Patient Position: Sitting, Cuff Size: Adult Regular)   Pulse 78   Ht 1.702 m (5' 7\")   Wt 69.4 kg (153 lb)   LMP  (LMP Unknown)   SpO2 98%   BMI 23.96 kg/m    Pleasant without acute distress  Breast feeding:  y        Baby name: Ada   Spontaneous vaginal delivery: y Stitches: y  PHQ9:  0  Bleeding:  Spotting/light  Vulva:  good  Family planning:  condoms  Other concerns:  n    Assessment:    PP 2 w  BF  Family planning    Plan:   Continue BF  Condoms  Return to office: 4 wks for PP 6 week visit and as needed    Greater than 20 were spent with this patient discussing routine PP cares  Bob HARP, BLADIMIR    "

## 2019-05-01 ENCOUNTER — OFFICE VISIT (OUTPATIENT)
Dept: CHIROPRACTIC MEDICINE | Facility: OTHER | Age: 29
End: 2019-05-01
Attending: CHIROPRACTOR
Payer: COMMERCIAL

## 2019-05-01 DIAGNOSIS — M99.01 SEGMENTAL AND SOMATIC DYSFUNCTION OF CERVICAL REGION: ICD-10-CM

## 2019-05-01 DIAGNOSIS — M99.03 SEGMENTAL AND SOMATIC DYSFUNCTION OF LUMBAR REGION: Primary | ICD-10-CM

## 2019-05-01 DIAGNOSIS — M99.02 SEGMENTAL AND SOMATIC DYSFUNCTION OF THORACIC REGION: ICD-10-CM

## 2019-05-01 DIAGNOSIS — M54.50 ACUTE BILATERAL LOW BACK PAIN WITHOUT SCIATICA: ICD-10-CM

## 2019-05-01 PROCEDURE — 98941 CHIROPRACT MANJ 3-4 REGIONS: CPT | Mod: AT | Performed by: CHIROPRACTOR

## 2019-05-02 NOTE — PROGRESS NOTES

## 2019-05-13 ENCOUNTER — PRENATAL OFFICE VISIT (OUTPATIENT)
Dept: OBGYN | Facility: OTHER | Age: 29
End: 2019-05-13
Attending: ADVANCED PRACTICE MIDWIFE
Payer: COMMERCIAL

## 2019-05-13 VITALS
HEIGHT: 67 IN | BODY MASS INDEX: 23.7 KG/M2 | DIASTOLIC BLOOD PRESSURE: 58 MMHG | SYSTOLIC BLOOD PRESSURE: 106 MMHG | WEIGHT: 151 LBS

## 2019-05-13 PROCEDURE — 99207 ZZC POST PARTUM EXAM: CPT | Performed by: ADVANCED PRACTICE MIDWIFE

## 2019-05-13 ASSESSMENT — PATIENT HEALTH QUESTIONNAIRE - PHQ9: SUM OF ALL RESPONSES TO PHQ QUESTIONS 1-9: 0

## 2019-05-13 ASSESSMENT — ANXIETY QUESTIONNAIRES
GAD7 TOTAL SCORE: 0
7. FEELING AFRAID AS IF SOMETHING AWFUL MIGHT HAPPEN: NOT AT ALL
6. BECOMING EASILY ANNOYED OR IRRITABLE: NOT AT ALL
5. BEING SO RESTLESS THAT IT IS HARD TO SIT STILL: NOT AT ALL
4. TROUBLE RELAXING: NOT AT ALL
3. WORRYING TOO MUCH ABOUT DIFFERENT THINGS: NOT AT ALL
1. FEELING NERVOUS, ANXIOUS, OR ON EDGE: NOT AT ALL
2. NOT BEING ABLE TO STOP OR CONTROL WORRYING: NOT AT ALL

## 2019-05-13 ASSESSMENT — PAIN SCALES - GENERAL: PAINLEVEL: NO PAIN (0)

## 2019-05-13 ASSESSMENT — MIFFLIN-ST. JEOR: SCORE: 1437.56

## 2019-05-13 NOTE — NURSING NOTE
"Chief Complaint   Patient presents with     Post Partum Exam       Initial LMP  (LMP Unknown)  Estimated body mass index is 23.96 kg/m  as calculated from the following:    Height as of 4/22/19: 1.702 m (5' 7\").    Weight as of 4/22/19: 69.4 kg (153 lb).  Medication Reconciliation: complete    Melanie Posey LPN  "

## 2019-05-13 NOTE — PROGRESS NOTES
"Johana Duff is a 28 year old female is 6 weeks post partum  /58 (BP Location: Left arm, Patient Position: Chair, Cuff Size: Adult Regular)   Ht 1.702 m (5' 7\")   Wt 68.5 kg (151 lb)   LMP  (LMP Unknown)   BMI 23.65 kg/m    Pleasant without acute distress  Breast feeding:  y        Baby name: Ada Paul   Spontaneous vaginal delivery: y Stitches: y   PHQ9:  0  Bleeding:  none  Vulva:  great  Family planning:  condoms  Other concerns:  none    Pelvic:  Vagina and vulva are normal; well healed, no discharge is noted.    Cervix: normal without lesions.    Uterus:  mobile, normal in size and shape without tenderness.  Adnexa: without masses or tenderness.    Assessment:    PP 6 weeks  BF  Family planning      Plan:   Continue BF  Condoms with BF    Greater than 20 were spent with this patient on routine PP cares    KATIUSKA Overton, PHUM    "

## 2019-05-14 ASSESSMENT — ANXIETY QUESTIONNAIRES: GAD7 TOTAL SCORE: 0

## 2019-05-14 NOTE — PATIENT INSTRUCTIONS
Return to office in one year for well woman care and as needed.    Thank you for allowing Bob HARP CNM and our OB team to participate in your care.  If you have a scheduling or an appointment question please contact [facility] Unit Coordinator at their direct line [phone].   ALL nursing questions or concerns can be directed to your OB nurse at: [phone] Erin Nagy/Anne Marie

## 2019-06-21 ENCOUNTER — OFFICE VISIT (OUTPATIENT)
Dept: CHIROPRACTIC MEDICINE | Facility: OTHER | Age: 29
End: 2019-06-21
Attending: CHIROPRACTOR
Payer: COMMERCIAL

## 2019-06-21 DIAGNOSIS — M99.03 SEGMENTAL AND SOMATIC DYSFUNCTION OF LUMBAR REGION: Primary | ICD-10-CM

## 2019-06-21 DIAGNOSIS — M99.01 SEGMENTAL AND SOMATIC DYSFUNCTION OF CERVICAL REGION: ICD-10-CM

## 2019-06-21 DIAGNOSIS — M54.50 ACUTE BILATERAL LOW BACK PAIN WITHOUT SCIATICA: ICD-10-CM

## 2019-06-21 DIAGNOSIS — M99.02 SEGMENTAL AND SOMATIC DYSFUNCTION OF THORACIC REGION: ICD-10-CM

## 2019-06-21 PROCEDURE — 98941 CHIROPRACT MANJ 3-4 REGIONS: CPT | Mod: AT | Performed by: CHIROPRACTOR

## 2019-06-21 NOTE — PROGRESS NOTES

## 2019-07-12 ENCOUNTER — OFFICE VISIT (OUTPATIENT)
Dept: CHIROPRACTIC MEDICINE | Facility: OTHER | Age: 29
End: 2019-07-12
Attending: CHIROPRACTOR
Payer: COMMERCIAL

## 2019-07-12 DIAGNOSIS — M99.01 SEGMENTAL AND SOMATIC DYSFUNCTION OF CERVICAL REGION: ICD-10-CM

## 2019-07-12 DIAGNOSIS — M99.03 SEGMENTAL AND SOMATIC DYSFUNCTION OF LUMBAR REGION: Primary | ICD-10-CM

## 2019-07-12 DIAGNOSIS — M99.02 SEGMENTAL AND SOMATIC DYSFUNCTION OF THORACIC REGION: ICD-10-CM

## 2019-07-12 DIAGNOSIS — M54.50 ACUTE BILATERAL LOW BACK PAIN WITHOUT SCIATICA: ICD-10-CM

## 2019-07-12 PROCEDURE — 98941 CHIROPRACT MANJ 3-4 REGIONS: CPT | Mod: AT | Performed by: CHIROPRACTOR

## 2019-07-15 NOTE — PROGRESS NOTES

## 2019-08-07 ENCOUNTER — OFFICE VISIT (OUTPATIENT)
Dept: CHIROPRACTIC MEDICINE | Facility: OTHER | Age: 29
End: 2019-08-07
Attending: CHIROPRACTOR
Payer: COMMERCIAL

## 2019-08-07 DIAGNOSIS — M54.50 ACUTE BILATERAL LOW BACK PAIN WITHOUT SCIATICA: ICD-10-CM

## 2019-08-07 DIAGNOSIS — M99.01 SEGMENTAL AND SOMATIC DYSFUNCTION OF CERVICAL REGION: ICD-10-CM

## 2019-08-07 DIAGNOSIS — M99.03 SEGMENTAL AND SOMATIC DYSFUNCTION OF LUMBAR REGION: Primary | ICD-10-CM

## 2019-08-07 DIAGNOSIS — M99.02 SEGMENTAL AND SOMATIC DYSFUNCTION OF THORACIC REGION: ICD-10-CM

## 2019-08-07 PROCEDURE — 98941 CHIROPRACT MANJ 3-4 REGIONS: CPT | Mod: AT | Performed by: CHIROPRACTOR

## 2019-08-08 NOTE — PROGRESS NOTES

## 2019-09-04 ENCOUNTER — OFFICE VISIT (OUTPATIENT)
Dept: CHIROPRACTIC MEDICINE | Facility: OTHER | Age: 29
End: 2019-09-04
Attending: CHIROPRACTOR
Payer: COMMERCIAL

## 2019-09-04 DIAGNOSIS — M99.02 SEGMENTAL AND SOMATIC DYSFUNCTION OF THORACIC REGION: ICD-10-CM

## 2019-09-04 DIAGNOSIS — M99.03 SEGMENTAL AND SOMATIC DYSFUNCTION OF LUMBAR REGION: Primary | ICD-10-CM

## 2019-09-04 DIAGNOSIS — M99.01 SEGMENTAL AND SOMATIC DYSFUNCTION OF CERVICAL REGION: ICD-10-CM

## 2019-09-04 DIAGNOSIS — M54.50 ACUTE BILATERAL LOW BACK PAIN WITHOUT SCIATICA: ICD-10-CM

## 2019-09-04 PROCEDURE — 98941 CHIROPRACT MANJ 3-4 REGIONS: CPT | Mod: AT | Performed by: CHIROPRACTOR

## 2019-09-04 NOTE — PROGRESS NOTES
Subjective Finding:    Chief compalint: No chief complaint on file.  , Pain Scale: 7/10, Intensity: sharp, Duration: 1 weeks, Radiating: no.    Date of injury:     Activities that the pain restricts:   Home/household/hobbies/social activities: yes.  Work duties: yes.  Sleep: yes.  Makes symptoms better: rest.  Makes symptoms worse: activity.  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 Yes: MD.  Work related: no.  Automobile related injury: no.    Objective and Assessment:    Posture Analysis:   High shoulder: .  Head tilt: .  High iliac crest: .  Head carriage: forward.  Thoracic Kyphosis: neutral.  Lumbar Lordosis: forward.    Lumbar Range of Motion: extension decreased.  Cervical Range of Motion: extension decreased.  Thoracic Range of Motion: extension decreased.  Extremity Range of Motion: .    Palpation:   Quad lumb: bilateral, referred pain: no  Lev scapulae: dull pain, referred pain: no    Segmental dysfunction pre-treatment and treatment area: C3, C4, T5, L4 and L5.    Assessment post-treatment:  Cervical: ROM increased.  Thoracic: ROM increased.  Lumbar: ROM increased.    Comments: .  Post pregnancy    Complicating Factors: .    Procedure(s):  CMT:  77917 Chiropractic manipulative treatment 3-4 regions performed   Cervical: Diversified, See above for level, Supine, Thoracic: Diversified, See above for level, Prone and Lumbar: Diversified, See above for level, Side posture    Modalities:  None performed this visit    Therapeutic procedures:  None    Plan:  Treatment plan: PRN.  Instructed patient: stretch as instructed at visit.  Short term goals: reduce pain.  Long term goals: restore normal function.  Prognosis: excellent.

## 2019-09-26 ENCOUNTER — OFFICE VISIT (OUTPATIENT)
Dept: CHIROPRACTIC MEDICINE | Facility: OTHER | Age: 29
End: 2019-09-26
Attending: CHIROPRACTOR
Payer: COMMERCIAL

## 2019-09-26 DIAGNOSIS — M99.03 SEGMENTAL AND SOMATIC DYSFUNCTION OF LUMBAR REGION: Primary | ICD-10-CM

## 2019-09-26 DIAGNOSIS — M99.02 SEGMENTAL AND SOMATIC DYSFUNCTION OF THORACIC REGION: ICD-10-CM

## 2019-09-26 DIAGNOSIS — M99.01 SEGMENTAL AND SOMATIC DYSFUNCTION OF CERVICAL REGION: ICD-10-CM

## 2019-09-26 DIAGNOSIS — M54.50 ACUTE BILATERAL LOW BACK PAIN WITHOUT SCIATICA: ICD-10-CM

## 2019-09-26 PROCEDURE — 98941 CHIROPRACT MANJ 3-4 REGIONS: CPT | Mod: AT | Performed by: CHIROPRACTOR

## 2019-09-26 NOTE — PROGRESS NOTES
Subjective Finding:    Chief compalint: Patient presents with:  Neck Pain  Back Pain  , Pain Scale: 7/10, Intensity: sharp, Duration: 1 weeks, Radiating: no.    Date of injury:     Activities that the pain restricts:   Home/household/hobbies/social activities: yes.  Work duties: yes.  Sleep: yes.  Makes symptoms better: rest.  Makes symptoms worse: activity.  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 Yes: MD.  Work related: no.  Automobile related injury: no.    Objective and Assessment:    Posture Analysis:   High shoulder: .  Head tilt: .  High iliac crest: .  Head carriage: forward.  Thoracic Kyphosis: neutral.  Lumbar Lordosis: forward.    Lumbar Range of Motion: extension decreased.  Cervical Range of Motion: extension decreased.  Thoracic Range of Motion: extension decreased.  Extremity Range of Motion: .    Palpation:   Quad lumb: bilateral, referred pain: no  Lev scapulae: dull pain, referred pain: no    Segmental dysfunction pre-treatment and treatment area: C3, C4, T5, L4 and L5.    Assessment post-treatment:  Cervical: ROM increased.  Thoracic: ROM increased.  Lumbar: ROM increased.    Comments: .  Post pregnancy    Complicating Factors: .    Procedure(s):  CMT:  44252 Chiropractic manipulative treatment 3-4 regions performed   Cervical: Diversified, See above for level, Supine, Thoracic: Diversified, See above for level, Prone and Lumbar: Diversified, See above for level, Side posture    Modalities:  None performed this visit    Therapeutic procedures:  None    Plan:  Treatment plan: PRN.  Instructed patient: stretch as instructed at visit.  Short term goals: reduce pain.  Long term goals: restore normal function.  Prognosis: excellent.

## 2019-10-07 NOTE — PROGRESS NOTES
Subjective     Johana Duff is a 28 year old female who presents to clinic today for the following health issues:    HPI   mole      Duration:ongoing     Description (location/character/radiation): Right leg, side of calf    Intensity:  mild    Accompanying signs and symptoms: changing in shape, change in coloring, change in shape    History (similar episodes/previous evaluation): None    Precipitating or alleviating factors: None    Therapies tried and outcome: None   She first noticed it change during pregnancy and then afterwards as well    Patient Active Problem List   Diagnosis     Routine postpartum follow-up     Congenital pectus excavatum     Plantar fasciitis     Cervicalgia     Bilateral carpal tunnel syndrome     Insufficient endocervical or transformation zone component in cervical specimen     Encounter for supervision of other normal pregnancy in third trimester     Encounter for triage in pregnant patient     Threatened labor      (spontaneous vaginal delivery)     Atypical nevi     Past Surgical History:   Procedure Laterality Date     wisdom teeth extraction         Social History     Tobacco Use     Smoking status: Never Smoker     Smokeless tobacco: Never Used   Substance Use Topics     Alcohol use: Yes     Comment: 3/year     Family History   Problem Relation Age of Onset     Cerebrovascular Disease Maternal Grandmother         complications     Hypertension Maternal Grandmother      Family History Negative Mother      Coronary Artery Disease Maternal Grandfather         s/p stents     Lung Cancer Maternal Grandfather         +tobacco     Family History Negative Father         lymes     Family History Negative Sister      Emphysema Paternal Grandmother         +tobacco     Family History Negative Sister      Family History Negative Sister      Emphysema Paternal Grandfather         +tobacco         Current Outpatient Medications   Medication Sig Dispense Refill     cholecalciferol  "(VITAMIN D3) 5000 units TABS tablet Take 5,000 Units by mouth daily       Prenatal MV-Min-Fe Fum-FA-DHA (PRENATAL 1 PO)        Allergies   Allergen Reactions     Blue Dyes      Red Dye      Recent Labs   Lab Test 10/10/17  1108 03/27/15  2031   ALT  --  21   CR  --  0.83   GFRESTIMATED  --  83   GFRESTBLACK  --  >90   GFR Calc     POTASSIUM  --  3.7   TSH 1.30  --       Reviewed and updated as needed this visit by Provider         Review of Systems   ROS COMP: Constitutional, HEENT, cardiovascular, pulmonary, gi and gu systems are negative, except as otherwise noted.      Objective    /58 (BP Location: Left arm, Patient Position: Sitting, Cuff Size: Adult Regular)   Pulse 96   Temp 97.4  F (36.3  C) (Tympanic)   Ht 1.702 m (5' 7\")   Wt 61 kg (134 lb 6.4 oz)   SpO2 99%   BMI 21.05 kg/m    Body mass index is 21.05 kg/m .  Physical Exam   GENERAL: healthy, alert and no distress  NECK: no adenopathy, no asymmetry, masses, or scars and thyroid normal to palpation  RESP: lungs clear to auscultation - no rales, rhonchi or wheezes  CV: regular rate and rhythm, normal S1 S2, no S3 or S4, no murmur, click or rub, no peripheral edema and peripheral pulses strong  SKIN:  Right lower extremity lateral aspect 5mm papule black, red and brown with central area of change also  MS: no gross musculoskeletal defects noted, no edema  PSYCH: mentation appears normal, affect normal/bright    Diagnostic Test Results:  Labs reviewed in Epic  Results for orders placed or performed during the hospital encounter of 04/08/19   Treponema Abs w Reflex to RPR and Titer   Result Value Ref Range    Treponema Antibodies Nonreactive NR^Nonreactive   CBC with platelets   Result Value Ref Range    WBC 12.7 (H) 4.0 - 11.0 10e9/L    RBC Count 4.01 3.8 - 5.2 10e12/L    Hemoglobin 11.4 (L) 11.7 - 15.7 g/dL    Hematocrit 34.3 (L) 35.0 - 47.0 %    MCV 86 78 - 100 fl    MCH 28.4 26.5 - 33.0 pg    MCHC 33.2 31.5 - 36.5 g/dL    RDW " 13.3 10.0 - 15.0 %    Platelet Count 199 150 - 450 10e9/L   Hemoglobin   Result Value Ref Range    Hemoglobin 10.5 (L) 11.7 - 15.7 g/dL   ABO/Rh type and screen   Result Value Ref Range    ABO B     RH(D) Pos     Antibody Screen Neg     Test Valid Only At [facility]        Specimen Expires 04/11/2019            Assessment & Plan     (D22.9) Change in mole  (primary encounter diagnosis)  Comment:   Plan: Surgical pathology exam        Will remove today    (D22.9) Atypical nevi  Comment:   Plan: pathology sent     Patient was agreeable to this plan and had no further questions.  There are no Patient Instructions on file for this visit.    No follow-ups on file.    Debby Floyd MD  [facility]

## 2019-10-10 ENCOUNTER — OFFICE VISIT (OUTPATIENT)
Dept: CHIROPRACTIC MEDICINE | Facility: OTHER | Age: 29
End: 2019-10-10
Attending: CHIROPRACTOR
Payer: COMMERCIAL

## 2019-10-10 ENCOUNTER — OFFICE VISIT (OUTPATIENT)
Dept: FAMILY MEDICINE | Facility: OTHER | Age: 29
End: 2019-10-10
Attending: FAMILY MEDICINE
Payer: COMMERCIAL

## 2019-10-10 VITALS
WEIGHT: 134.4 LBS | BODY MASS INDEX: 21.09 KG/M2 | SYSTOLIC BLOOD PRESSURE: 101 MMHG | HEIGHT: 67 IN | TEMPERATURE: 97.4 F | OXYGEN SATURATION: 99 % | DIASTOLIC BLOOD PRESSURE: 58 MMHG | HEART RATE: 96 BPM

## 2019-10-10 DIAGNOSIS — M99.03 SEGMENTAL AND SOMATIC DYSFUNCTION OF LUMBAR REGION: Primary | ICD-10-CM

## 2019-10-10 DIAGNOSIS — D22.9 ATYPICAL NEVI: ICD-10-CM

## 2019-10-10 DIAGNOSIS — M54.50 ACUTE BILATERAL LOW BACK PAIN WITHOUT SCIATICA: ICD-10-CM

## 2019-10-10 DIAGNOSIS — D22.9 CHANGE IN MOLE: Primary | ICD-10-CM

## 2019-10-10 DIAGNOSIS — M99.02 SEGMENTAL AND SOMATIC DYSFUNCTION OF THORACIC REGION: ICD-10-CM

## 2019-10-10 DIAGNOSIS — M99.01 SEGMENTAL AND SOMATIC DYSFUNCTION OF CERVICAL REGION: ICD-10-CM

## 2019-10-10 PROCEDURE — 98941 CHIROPRACT MANJ 3-4 REGIONS: CPT | Mod: AT | Performed by: CHIROPRACTOR

## 2019-10-10 PROCEDURE — 99213 OFFICE O/P EST LOW 20 MIN: CPT | Mod: 25 | Performed by: FAMILY MEDICINE

## 2019-10-10 PROCEDURE — 88342 IMHCHEM/IMCYTCHM 1ST ANTB: CPT | Mod: TC | Performed by: FAMILY MEDICINE

## 2019-10-10 PROCEDURE — 88305 TISSUE EXAM BY PATHOLOGIST: CPT | Mod: TC | Performed by: FAMILY MEDICINE

## 2019-10-10 ASSESSMENT — PAIN SCALES - GENERAL: PAINLEVEL: NO PAIN (0)

## 2019-10-10 ASSESSMENT — MIFFLIN-ST. JEOR: SCORE: 1362.26

## 2019-10-10 NOTE — NURSING NOTE
"Chief Complaint   Patient presents with     Derm Problem     mole       Initial /58 (BP Location: Left arm, Patient Position: Sitting, Cuff Size: Adult Regular)   Pulse 96   Temp 97.4  F (36.3  C) (Tympanic)   Ht 1.702 m (5' 7\")   Wt 61 kg (134 lb 6.4 oz)   SpO2 99%   BMI 21.05 kg/m   Estimated body mass index is 21.05 kg/m  as calculated from the following:    Height as of this encounter: 1.702 m (5' 7\").    Weight as of this encounter: 61 kg (134 lb 6.4 oz).  Medication Reconciliation: complete  Gabbie Leon LPN  "

## 2019-10-10 NOTE — PROGRESS NOTES

## 2019-10-30 ENCOUNTER — MEDICAL CORRESPONDENCE (OUTPATIENT)
Dept: HEALTH INFORMATION MANAGEMENT | Facility: CLINIC | Age: 29
End: 2019-10-30

## 2019-10-30 ENCOUNTER — OFFICE VISIT (OUTPATIENT)
Dept: CHIROPRACTIC MEDICINE | Facility: OTHER | Age: 29
End: 2019-10-30
Attending: CHIROPRACTOR
Payer: COMMERCIAL

## 2019-10-30 DIAGNOSIS — M99.01 SEGMENTAL AND SOMATIC DYSFUNCTION OF CERVICAL REGION: ICD-10-CM

## 2019-10-30 DIAGNOSIS — M54.50 ACUTE BILATERAL LOW BACK PAIN WITHOUT SCIATICA: ICD-10-CM

## 2019-10-30 DIAGNOSIS — M99.02 SEGMENTAL AND SOMATIC DYSFUNCTION OF THORACIC REGION: ICD-10-CM

## 2019-10-30 DIAGNOSIS — M99.03 SEGMENTAL AND SOMATIC DYSFUNCTION OF LUMBAR REGION: Primary | ICD-10-CM

## 2019-10-30 PROCEDURE — 98941 CHIROPRACT MANJ 3-4 REGIONS: CPT | Mod: AT | Performed by: CHIROPRACTOR

## 2019-10-31 NOTE — PROGRESS NOTES

## 2019-11-18 ENCOUNTER — TELEPHONE (OUTPATIENT)
Dept: FAMILY MEDICINE | Facility: OTHER | Age: 29
End: 2019-11-18

## 2019-11-18 DIAGNOSIS — C43.9 MELANOMA OF SKIN (H): Primary | ICD-10-CM

## 2019-11-18 NOTE — TELEPHONE ENCOUNTER
Received phone call from Dr. Alcantar. States they received results from skin biopsy finally and it showed melanoma. Patient has not been notified yet.

## 2019-11-19 ENCOUNTER — TELEPHONE (OUTPATIENT)
Dept: FAMILY MEDICINE | Facility: OTHER | Age: 29
End: 2019-11-19

## 2019-11-19 NOTE — TELEPHONE ENCOUNTER
Please put a referral in for this patient to go to either [facility] or [facility]. Dr. Temple is out today and will not be back until 12/4/19.

## 2019-11-19 NOTE — TELEPHONE ENCOUNTER
Referral has been placed already. Please use existing referral as it does say [place] in the comments section.

## 2019-11-21 ENCOUNTER — TRANSFERRED RECORDS (OUTPATIENT)
Dept: HEALTH INFORMATION MANAGEMENT | Facility: CLINIC | Age: 29
End: 2019-11-21

## 2019-11-26 NOTE — PROGRESS NOTES
[facility] - [place]  [address]  [place] [address]  [phone]  Dept: [phone]    PRE-OP EVALUATION:  Today's date: 2019    Johana Duff (: 1990) presents for pre-operative evaluation assessment as requested by Dr. Harvey.  She requires evaluation and anesthesia risk assessment prior to undergoing surgery/procedure for treatment of right leg melanoma, excision.    Proposed Surgery/ Procedure: Melanoma removal, right lower leg.  Date of Surgery/ Procedure: 19  Time of Surgery/ Procedure: [facility]  Hospital/Surgical Facility: [facility]  Primary Physician: Debby Floyd  Type of Anesthesia Anticipated: to be determined    Patient has a Health Care Directive or Living Will:  YES , filling one out    1. NO - Do you have a history of heart attack, stroke, stent, bypass or surgery on an artery in the head, neck, heart or legs?  2. NO - Do you ever have any pain or discomfort in your chest?  3. NO - Do you have a history of  Heart Failure?  4. NO - Are you troubled by shortness of breath when: walking on the level, up a slight hill or at night?  5. YES - DO YOU CURRENTLY HAVE A COLD, BRONCHITIS OR OTHER RESPIRATORY INFECTION? Yes, sick today  6. NO - Do you have a cough, shortness of breath or wheezing?  7. NO - Do you sometimes get pains in the calves of your legs when you walk?  8. NO - Do you or anyone in your family have previous history of blood clots?  9. NO - Do you or does anyone in your family have a serious bleeding problem such as prolonged bleeding following surgeries or cuts?  10. NO - Have you ever had problems with anemia or been told to take iron pills?  11. NO - Have you had any abnormal blood loss such as black, tarry or bloody stools, or abnormal vaginal bleeding?  12. NO - Have you ever had a blood transfusion?  13. NO - Have you or any of your relatives ever had problems with anesthesia?  14. NO - Do you have sleep apnea, excessive snoring or  daytime drowsiness?  15. NO - Do you have any prosthetic heart valves?  16. NO - Do you have prosthetic joints?  17. NO - Is there any chance that you may be pregnant?      HPI:     HPI related to upcoming procedure: Patient noted change in mole.  No prior cancer.  No family history.      See problem list for active medical problems.  Problems all longstanding and stable, except as noted/documented.  See ROS for pertinent symptoms related to these conditions.      MEDICAL HISTORY:     Patient Active Problem List    Diagnosis Date Noted     Atypical nevi 10/10/2019     Priority: Medium     Threatened labor 2019     Priority: Medium      (spontaneous vaginal delivery) 2019     Priority: Medium     Ember Beverly  8 lb 5.5 oz  Small 2nd degree with repair       Insufficient endocervical or transformation zone component in cervical specimen 2018     Priority: Medium     Use cytobrush       Bilateral carpal tunnel syndrome 10/10/2017     Priority: Medium     Plantar fasciitis 2017     Priority: Medium     Cervicalgia 2017     Priority: Medium     Congenital pectus excavatum 2013     Priority: Medium      Past Medical History:   Diagnosis Date     Melanoma (H) 10/2019     Ovarian cyst 2002    left     Past Surgical History:   Procedure Laterality Date     wisdom teeth extraction  2014     Current Outpatient Medications   Medication Sig Dispense Refill     cholecalciferol (VITAMIN D3) 5000 units TABS tablet Take 5,000 Units by mouth daily       Prenatal MV-Min-Fe Fum-FA-DHA (PRENATAL 1 PO)        OTC products: None, except as noted above    Allergies   Allergen Reactions     Blue Dyes      Red Dye       Latex Allergy: NO    Social History     Tobacco Use     Smoking status: Never Smoker     Smokeless tobacco: Never Used   Substance Use Topics     Alcohol use: Yes     Comment: 3/year     History   Drug Use No       REVIEW OF SYSTEMS:   Constitutional, neuro, ENT, endocrine, pulmonary,  cardiac, gastrointestinal, genitourinary, musculoskeletal, integument and psychiatric systems are negative, except as otherwise noted.    EXAM:   BP 98/60   Pulse 98   Temp 98.1  F (36.7  C)   Wt 62.1 kg (137 lb)   SpO2 98%   BMI 21.46 kg/m      GENERAL APPEARANCE: healthy, alert and no distress     EYES: EOMI, PERRL     HENT: ear canals and TM's normal and nose and mouth without ulcers or lesions     NECK: no adenopathy, no asymmetry, masses, or scars and thyroid normal to palpation     RESP: lungs clear to auscultation - no rales, rhonchi or wheezes     CV: regular rates and rhythm, normal S1 S2, no S3 or S4 and no murmur, click or rub     ABDOMEN:  soft, nontender, no HSM or masses and bowel sounds normal     MS: extremities normal- no gross deformities noted, no evidence of inflammation in joints, FROM in all extremities.     SKIN: no suspicious lesions or rashes     SKIN: right lower leg with 1 cm area of purplish discoloration at site of prior biopsy     NEURO: Normal strength and tone, sensory exam grossly normal, mentation intact and speech normal     PSYCH: mentation appears normal. and affect normal/bright     LYMPHATICS: No cervical adenopathy    DIAGNOSTICS:     Results for orders placed or performed in visit on 11/29/19 (from the past 24 hour(s))   CBC with platelets and differential   Result Value Ref Range    WBC 9.6 4.0 - 11.0 10e9/L    RBC Count 4.41 3.8 - 5.2 10e12/L    Hemoglobin 13.6 11.7 - 15.7 g/dL    Hematocrit 39.8 35.0 - 47.0 %    MCV 90 78 - 100 fl    MCH 30.8 26.5 - 33.0 pg    MCHC 34.2 31.5 - 36.5 g/dL    RDW 11.6 10.0 - 15.0 %    Platelet Count 241 150 - 450 10e9/L    Diff Method Automated Method     % Neutrophils 73.0 %    % Lymphocytes 20.1 %    % Monocytes 5.2 %    % Eosinophils 1.0 %    % Basophils 0.4 %    % Immature Granulocytes 0.3 %    Nucleated RBCs 0 0 /100    Absolute Neutrophil 7.0 1.6 - 8.3 10e9/L    Absolute Lymphocytes 1.9 0.8 - 5.3 10e9/L    Absolute Monocytes 0.5  0.0 - 1.3 10e9/L    Absolute Eosinophils 0.1 0.0 - 0.7 10e9/L    Absolute Basophils 0.0 0.0 - 0.2 10e9/L    Abs Immature Granulocytes 0.0 0 - 0.4 10e9/L    Absolute Nucleated RBC 0.0    Comprehensive metabolic panel   Result Value Ref Range    Sodium 139 133 - 144 mmol/L    Potassium 3.8 3.4 - 5.3 mmol/L    Chloride 106 94 - 109 mmol/L    Carbon Dioxide 29 20 - 32 mmol/L    Anion Gap 4 3 - 14 mmol/L    Glucose 85 70 - 99 mg/dL    Urea Nitrogen 13 7 - 30 mg/dL    Creatinine 0.72 0.52 - 1.04 mg/dL    GFR Estimate >90 >60 mL/min/[1.73_m2]    GFR Estimate If Black >90 >60 mL/min/[1.73_m2]    Calcium 9.0 8.5 - 10.1 mg/dL    Bilirubin Total 0.4 0.2 - 1.3 mg/dL    Albumin 3.9 3.4 - 5.0 g/dL    Protein Total 7.3 6.8 - 8.8 g/dL    Alkaline Phosphatase 76 40 - 150 U/L    ALT 20 0 - 50 U/L    AST 10 0 - 45 U/L   HCG Qual, Urine ([id number])   Result Value Ref Range    HCG Qual Urine Negative NEG^Negative         Recent Labs   Lab Test 04/09/19  0534 04/08/19  0749 01/09/19  1510  03/27/15  2031   HGB 10.5* 11.4* 11.9   < > 13.7   PLT  --  199 240   < > 229   NA  --   --   --   --  139   POTASSIUM  --   --   --   --  3.7   CR  --   --   --   --  0.83    < > = values in this interval not displayed.        IMPRESSION:   Reason for surgery/procedure: excision right lower leg melanoma  Diagnosis/reason for consult: cardiopulmonary clearance    The proposed surgical procedure is considered LOW risk.    REVISED CARDIAC RISK INDEX  The patient has the following serious cardiovascular risks for perioperative complications such as (MI, PE, VFib and 3  AV Block):  No serious cardiac risks  INTERPRETATION: 0 risks: Class I (very low risk - 0.4% complication rate)    The patient has the following additional risks for perioperative complications:  No identified additional risks      ICD-10-CM    1. Preop general physical exam Z01.818 CBC with platelets and differential     Comprehensive metabolic panel     HCG Qual, Urine ([id number])   2.  Malignant melanoma of skin of right leg (H) C43.71 CBC with platelets and differential     Comprehensive metabolic panel     HCG Qual, Urine ([id number])       RECOMMENDATIONS:         --Patient is to take all scheduled medications on the day of surgery EXCEPT for modifications listed below.  Aspirin, NSAIDs on hold.    APPROVAL GIVEN to proceed with proposed procedure, without further diagnostic evaluation.  Did discuss that should her recent cold symptoms progress - that she should delay surgery.         Signed Electronically by: Beth Bradley MD    Copy of this evaluation report is provided to requesting physician.    [facility] Preop Guidelines    Revised Cardiac Risk Index

## 2019-11-27 ENCOUNTER — OFFICE VISIT (OUTPATIENT)
Dept: CHIROPRACTIC MEDICINE | Facility: OTHER | Age: 29
End: 2019-11-27
Attending: CHIROPRACTOR
Payer: COMMERCIAL

## 2019-11-27 DIAGNOSIS — M99.01 SEGMENTAL AND SOMATIC DYSFUNCTION OF CERVICAL REGION: Primary | ICD-10-CM

## 2019-11-27 DIAGNOSIS — M99.03 SEGMENTAL AND SOMATIC DYSFUNCTION OF LUMBAR REGION: ICD-10-CM

## 2019-11-27 DIAGNOSIS — M54.2 CERVICALGIA: ICD-10-CM

## 2019-11-27 DIAGNOSIS — M99.02 SEGMENTAL AND SOMATIC DYSFUNCTION OF THORACIC REGION: ICD-10-CM

## 2019-11-27 PROBLEM — Z34.83 ENCOUNTER FOR SUPERVISION OF OTHER NORMAL PREGNANCY IN THIRD TRIMESTER: Status: RESOLVED | Noted: 2018-10-01 | Resolved: 2019-11-27

## 2019-11-27 PROBLEM — Z36.89 ENCOUNTER FOR TRIAGE IN PREGNANT PATIENT: Status: RESOLVED | Noted: 2019-02-23 | Resolved: 2019-11-27

## 2019-11-27 PROCEDURE — 98941 CHIROPRACT MANJ 3-4 REGIONS: CPT | Mod: AT | Performed by: CHIROPRACTOR

## 2019-11-29 ENCOUNTER — OFFICE VISIT (OUTPATIENT)
Dept: FAMILY MEDICINE | Facility: OTHER | Age: 29
End: 2019-11-29
Attending: FAMILY MEDICINE
Payer: COMMERCIAL

## 2019-11-29 ENCOUNTER — TELEPHONE (OUTPATIENT)
Dept: FAMILY MEDICINE | Facility: OTHER | Age: 29
End: 2019-11-29

## 2019-11-29 VITALS
BODY MASS INDEX: 21.46 KG/M2 | WEIGHT: 137 LBS | OXYGEN SATURATION: 98 % | DIASTOLIC BLOOD PRESSURE: 60 MMHG | HEART RATE: 98 BPM | SYSTOLIC BLOOD PRESSURE: 98 MMHG | TEMPERATURE: 98.1 F

## 2019-11-29 DIAGNOSIS — Z01.818 PREOP GENERAL PHYSICAL EXAM: Primary | ICD-10-CM

## 2019-11-29 DIAGNOSIS — C43.71 MALIGNANT MELANOMA OF SKIN OF RIGHT LEG (H): ICD-10-CM

## 2019-11-29 LAB
ALBUMIN SERPL-MCNC: 3.9 G/DL (ref 3.4–5)
ALP SERPL-CCNC: 76 U/L (ref 40–150)
ALT SERPL W P-5'-P-CCNC: 20 U/L (ref 0–50)
ANION GAP SERPL CALCULATED.3IONS-SCNC: 4 MMOL/L (ref 3–14)
AST SERPL W P-5'-P-CCNC: 10 U/L (ref 0–45)
BASOPHILS # BLD AUTO: 0 10E9/L (ref 0–0.2)
BASOPHILS NFR BLD AUTO: 0.4 %
BILIRUB SERPL-MCNC: 0.4 MG/DL (ref 0.2–1.3)
BUN SERPL-MCNC: 13 MG/DL (ref 7–30)
CALCIUM SERPL-MCNC: 9 MG/DL (ref 8.5–10.1)
CHLORIDE SERPL-SCNC: 106 MMOL/L (ref 94–109)
CO2 SERPL-SCNC: 29 MMOL/L (ref 20–32)
CREAT SERPL-MCNC: 0.72 MG/DL (ref 0.52–1.04)
DIFFERENTIAL METHOD BLD: NORMAL
EOSINOPHIL # BLD AUTO: 0.1 10E9/L (ref 0–0.7)
EOSINOPHIL NFR BLD AUTO: 1 %
ERYTHROCYTE [DISTWIDTH] IN BLOOD BY AUTOMATED COUNT: 11.6 % (ref 10–15)
GFR SERPL CREATININE-BSD FRML MDRD: >90 ML/MIN/{1.73_M2}
GLUCOSE SERPL-MCNC: 85 MG/DL (ref 70–99)
HCG UR QL: NEGATIVE
HCT VFR BLD AUTO: 39.8 % (ref 35–47)
HGB BLD-MCNC: 13.6 G/DL (ref 11.7–15.7)
IMM GRANULOCYTES # BLD: 0 10E9/L (ref 0–0.4)
IMM GRANULOCYTES NFR BLD: 0.3 %
LYMPHOCYTES # BLD AUTO: 1.9 10E9/L (ref 0.8–5.3)
LYMPHOCYTES NFR BLD AUTO: 20.1 %
MCH RBC QN AUTO: 30.8 PG (ref 26.5–33)
MCHC RBC AUTO-ENTMCNC: 34.2 G/DL (ref 31.5–36.5)
MCV RBC AUTO: 90 FL (ref 78–100)
MONOCYTES # BLD AUTO: 0.5 10E9/L (ref 0–1.3)
MONOCYTES NFR BLD AUTO: 5.2 %
NEUTROPHILS # BLD AUTO: 7 10E9/L (ref 1.6–8.3)
NEUTROPHILS NFR BLD AUTO: 73 %
NRBC # BLD AUTO: 0 10*3/UL
NRBC BLD AUTO-RTO: 0 /100
PLATELET # BLD AUTO: 241 10E9/L (ref 150–450)
POTASSIUM SERPL-SCNC: 3.8 MMOL/L (ref 3.4–5.3)
PROT SERPL-MCNC: 7.3 G/DL (ref 6.8–8.8)
RBC # BLD AUTO: 4.41 10E12/L (ref 3.8–5.2)
SODIUM SERPL-SCNC: 139 MMOL/L (ref 133–144)
WBC # BLD AUTO: 9.6 10E9/L (ref 4–11)

## 2019-11-29 PROCEDURE — 36415 COLL VENOUS BLD VENIPUNCTURE: CPT | Performed by: FAMILY MEDICINE

## 2019-11-29 PROCEDURE — 80053 COMPREHEN METABOLIC PANEL: CPT | Performed by: FAMILY MEDICINE

## 2019-11-29 PROCEDURE — 85025 COMPLETE CBC W/AUTO DIFF WBC: CPT | Performed by: FAMILY MEDICINE

## 2019-11-29 PROCEDURE — 99214 OFFICE O/P EST MOD 30 MIN: CPT | Performed by: FAMILY MEDICINE

## 2019-11-29 PROCEDURE — 81025 URINE PREGNANCY TEST: CPT | Performed by: FAMILY MEDICINE

## 2019-11-29 ASSESSMENT — PAIN SCALES - GENERAL: PAINLEVEL: NO PAIN (0)

## 2019-11-29 NOTE — NURSING NOTE
"Chief Complaint   Patient presents with     Pre-Op Exam       Initial BP 98/60   Pulse 98   Temp 98.1  F (36.7  C)   Wt 62.1 kg (137 lb)   SpO2 98%   BMI 21.46 kg/m   Estimated body mass index is 21.46 kg/m  as calculated from the following:    Height as of 10/10/19: 1.702 m (5' 7\").    Weight as of this encounter: 62.1 kg (137 lb).  Medication Reconciliation: complete  Jessa Behrman, LPN  "

## 2019-12-02 NOTE — PROGRESS NOTES

## 2019-12-04 ENCOUNTER — TRANSFERRED RECORDS (OUTPATIENT)
Dept: HEALTH INFORMATION MANAGEMENT | Facility: CLINIC | Age: 29
End: 2019-12-04

## 2019-12-27 ENCOUNTER — OFFICE VISIT (OUTPATIENT)
Dept: CHIROPRACTIC MEDICINE | Facility: OTHER | Age: 29
End: 2019-12-27
Attending: CHIROPRACTOR
Payer: COMMERCIAL

## 2019-12-27 DIAGNOSIS — M99.02 SEGMENTAL AND SOMATIC DYSFUNCTION OF THORACIC REGION: ICD-10-CM

## 2019-12-27 DIAGNOSIS — M99.01 SEGMENTAL AND SOMATIC DYSFUNCTION OF CERVICAL REGION: Primary | ICD-10-CM

## 2019-12-27 DIAGNOSIS — M99.03 SEGMENTAL AND SOMATIC DYSFUNCTION OF LUMBAR REGION: ICD-10-CM

## 2019-12-27 DIAGNOSIS — M54.2 CERVICALGIA: ICD-10-CM

## 2019-12-27 PROCEDURE — 98941 CHIROPRACT MANJ 3-4 REGIONS: CPT | Mod: AT | Performed by: CHIROPRACTOR

## 2019-12-27 NOTE — PROGRESS NOTES
Subjective Finding:    Chief compalint: Patient presents with:  Neck Pain  Back Pain  , Pain Scale: 7/10, Intensity: sharp, Duration: 1 weeks, Radiating: no.    Date of injury:     Activities that the pain restricts:   Home/household/hobbies/social activities: yes.  Work duties: yes.  Sleep: yes.  Makes symptoms better: rest.  Makes symptoms worse: activity.  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 Yes: MD.  Work related: no.  Automobile related injury: no.    Objective and Assessment:    Posture Analysis:   High shoulder: .  Head tilt: .  High iliac crest: .  Head carriage: forward.  Thoracic Kyphosis: neutral.  Lumbar Lordosis: forward.    Lumbar Range of Motion: extension decreased.  Cervical Range of Motion: extension decreased.  Thoracic Range of Motion: extension decreased.  Extremity Range of Motion: .    Palpation:   Quad lumb: bilateral, referred pain: no  Lev scapulae: dull pain, referred pain: no    Segmental dysfunction pre-treatment and treatment area: C3, C4, T5, L4 and L5.    Assessment post-treatment:  Cervical: ROM increased.  Thoracic: ROM increased.  Lumbar: ROM increased.    Comments: .  Post pregnancy    Complicating Factors: .    Procedure(s):  CMT:  86358 Chiropractic manipulative treatment 3-4 regions performed   Cervical: Diversified, See above for level, Supine, Thoracic: Diversified, See above for level, Prone and Lumbar: Diversified, See above for level, Side posture    Modalities:  None performed this visit    Therapeutic procedures:  None    Plan:  Treatment plan: PRN.  Instructed patient: stretch as instructed at visit.  Short term goals: reduce pain.  Long term goals: restore normal function.  Prognosis: excellent.

## 2020-01-03 ENCOUNTER — HOSPITAL ENCOUNTER (EMERGENCY)
Facility: HOSPITAL | Age: 30
Discharge: HOME | End: 2020-01-03
Attending: PHYSICIAN ASSISTANT | Admitting: PHYSICIAN ASSISTANT
Payer: COMMERCIAL

## 2020-01-03 VITALS — RESPIRATION RATE: 16 BRPM | TEMPERATURE: 99 F | OXYGEN SATURATION: 99 %

## 2020-01-03 DIAGNOSIS — J06.9 URI (UPPER RESPIRATORY INFECTION): ICD-10-CM

## 2020-01-03 PROCEDURE — 99213 OFFICE O/P EST LOW 20 MIN: CPT | Mod: Z6 | Performed by: PHYSICIAN ASSISTANT

## 2020-01-03 PROCEDURE — G0463 HOSPITAL OUTPT CLINIC VISIT: HCPCS

## 2020-01-03 NOTE — ED PROVIDER NOTES
History     Chief Complaint   Patient presents with     URI     HPI  Johana Duff is a 29 year old female who presents with complaints of nasal and sinus congestion with cough for 7 days with associated cough that has been productive.  Denies fever, shortness of breath.  Has been taking OTC medications with minimal relief.      Allergies:  Allergies   Allergen Reactions     Blue Dyes      Red Dye        Problem List:    Patient Active Problem List    Diagnosis Date Noted     Atypical nevi 10/10/2019     Priority: Medium     Threatened labor 2019     Priority: Medium      (spontaneous vaginal delivery) 2019     Priority: Medium     Ember Beverly  8 lb 5.5 oz  Small 2nd degree with repair       Insufficient endocervical or transformation zone component in cervical specimen 2018     Priority: Medium     Use cytobrush       Bilateral carpal tunnel syndrome 10/10/2017     Priority: Medium     Plantar fasciitis 2017     Priority: Medium     Cervicalgia 2017     Priority: Medium     Congenital pectus excavatum 2013     Priority: Medium        Past Medical History:    Past Medical History:   Diagnosis Date     Melanoma (H) 10/2019     Ovarian cyst        Social History:  Marital Status:   [2]  Social History     Tobacco Use     Smoking status: Never Smoker     Smokeless tobacco: Never Used   Substance Use Topics     Alcohol use: Yes     Comment: 3/year     Drug use: No        Medications:    cholecalciferol (VITAMIN D3) 5000 units TABS tablet  Prenatal MV-Min-Fe Fum-FA-DHA (PRENATAL 1 PO)          Review of Systems :  Constitutional: Negative for fever.   HENT: Positive for congestion and sinus pressure. Negative for sore throat.    Respiratory: Positive for cough. Negative for shortness of breath.      Physical Exam   Heart Rate: 74  Temp: 99  F (37.2  C)  Resp: 16  SpO2: 99 %      Physical Exam   Constitutional: Well-developed and well-nourished.   Head: Normocephalic  and atraumatic.   Eyes: Conjunctivae and EOM are normal.  AMI.  Ears: TMs normal bilaterally  Nose: Congested with no sinus tenderness  Throat: no erythema or significant tonsillar swelling  Neck: Normal range of motion. No palpable lymphadenopathy  Cardiovascular: Normal rate and regular rhythm.   Pulmonary/Chest: Effort normal and CTAB.  Skin: Skin is warm and dry. No rash noted.     ED Course               No results found for this or any previous visit (from the past 24 hour(s)).    Medications - No data to display    Assessments & Plan (with Medical Decision Making)     I have reviewed the nursing notes.    I have reviewed the findings, diagnosis, plan and need for follow up with the patient.  May use Afrin nasal spray (oxymetazoline) two sprays in each nostril twice daily for up to three days.  Wait 15 minutes and try Neti pot or sinus rinse as tolerated or try saline nasal sprays throughout the day.  May take up to 600 mg ibuprofen every 6 hours as needed for pain.        Medication List      There are no discharge medications for this visit.         Final diagnoses:   URI (upper respiratory infection)       MARIA LUISA Kincaid on 1/3/2020 at 5:39 PM   1/3/2020   [facility]         Jimmy Bee PA  01/03/20 1738

## 2020-01-03 NOTE — ED AVS SNAPSHOT
[facility]  [address] [address]  Phone:  [phone]                                    Johana Duff   MRN: [id number]    Department:  [facility]   Date of Visit:  1/3/2020           After Visit Summary Signature Page    I have received my discharge instructions, and my questions have been answered. I have discussed any challenges I see with this plan with the nurse or doctor.    ..........................................................................................................................................  Patient/Patient Representative Signature      ..........................................................................................................................................  Patient Representative Print Name and Relationship to Patient    ..................................................               ................................................  Date                                   Time    ..........................................................................................................................................  Reviewed by Signature/Title    ...................................................              ..............................................  Date                                               Time          22EPIC Rev 08/18

## 2020-01-22 ENCOUNTER — OFFICE VISIT (OUTPATIENT)
Dept: CHIROPRACTIC MEDICINE | Facility: OTHER | Age: 30
End: 2020-01-22
Attending: CHIROPRACTOR
Payer: COMMERCIAL

## 2020-01-22 DIAGNOSIS — M99.02 SEGMENTAL AND SOMATIC DYSFUNCTION OF THORACIC REGION: ICD-10-CM

## 2020-01-22 DIAGNOSIS — M99.03 SEGMENTAL AND SOMATIC DYSFUNCTION OF LUMBAR REGION: Primary | ICD-10-CM

## 2020-01-22 DIAGNOSIS — M99.01 SEGMENTAL AND SOMATIC DYSFUNCTION OF CERVICAL REGION: ICD-10-CM

## 2020-01-22 DIAGNOSIS — M54.50 ACUTE BILATERAL LOW BACK PAIN WITHOUT SCIATICA: ICD-10-CM

## 2020-01-22 PROCEDURE — 98941 CHIROPRACT MANJ 3-4 REGIONS: CPT | Mod: AT | Performed by: CHIROPRACTOR

## 2020-01-23 NOTE — PROGRESS NOTES

## 2020-01-27 ENCOUNTER — OFFICE VISIT (OUTPATIENT)
Dept: CHIROPRACTIC MEDICINE | Facility: OTHER | Age: 30
End: 2020-01-27
Attending: CHIROPRACTOR
Payer: COMMERCIAL

## 2020-01-27 DIAGNOSIS — M99.03 SEGMENTAL AND SOMATIC DYSFUNCTION OF LUMBAR REGION: ICD-10-CM

## 2020-01-27 DIAGNOSIS — M54.2 CERVICALGIA: ICD-10-CM

## 2020-01-27 DIAGNOSIS — M99.01 SEGMENTAL AND SOMATIC DYSFUNCTION OF CERVICAL REGION: Primary | ICD-10-CM

## 2020-01-27 DIAGNOSIS — M99.02 SEGMENTAL AND SOMATIC DYSFUNCTION OF THORACIC REGION: ICD-10-CM

## 2020-01-27 PROCEDURE — 98941 CHIROPRACT MANJ 3-4 REGIONS: CPT | Mod: AT | Performed by: CHIROPRACTOR

## 2020-01-28 NOTE — PROGRESS NOTES
Subjective Finding:    Chief compalint: Patient presents with:  Neck Pain  Back Pain  , Pain Scale: 7/10, Intensity: sharp, Duration: 1 weeks, Radiating: no.    Date of injury:     Activities that the pain restricts:   Home/household/hobbies/social activities: yes.  Work duties: yes.  Sleep: yes.  Makes symptoms better: rest.  Makes symptoms worse: activity.  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 Yes: MD.  Work related: no.  Automobile related injury: no.    Objective and Assessment:    Posture Analysis:   High shoulder: .  Head tilt: .  High iliac crest: .  Head carriage: forward.  Thoracic Kyphosis: neutral.  Lumbar Lordosis: forward.    Lumbar Range of Motion: extension decreased.  Cervical Range of Motion: extension decreased.  Thoracic Range of Motion: extension decreased.  Extremity Range of Motion: .    Palpation:   Quad lumb: bilateral, referred pain: no  Lev scapulae: dull pain, referred pain: no    Segmental dysfunction pre-treatment and treatment area: C3, C4, T5, L4 and L5.    Assessment post-treatment:  Cervical: ROM increased.  Thoracic: ROM increased.  Lumbar: ROM increased.    Comments: .  Post pregnancy    Complicating Factors: .    Procedure(s):  CMT:  48168 Chiropractic manipulative treatment 3-4 regions performed   Cervical: Diversified, See above for level, Supine, Thoracic: Diversified, See above for level, Prone and Lumbar: Diversified, See above for level, Side posture    Modalities:  None performed this visit    Therapeutic procedures:  None    Plan:  Treatment plan: PRN.  Instructed patient: stretch as instructed at visit.  Short term goals: reduce pain.  Long term goals: restore normal function.  Prognosis: excellent.

## 2020-02-26 ENCOUNTER — OFFICE VISIT (OUTPATIENT)
Dept: CHIROPRACTIC MEDICINE | Facility: OTHER | Age: 30
End: 2020-02-26
Attending: CHIROPRACTOR
Payer: COMMERCIAL

## 2020-02-26 DIAGNOSIS — M54.2 CERVICALGIA: ICD-10-CM

## 2020-02-26 DIAGNOSIS — M99.03 SEGMENTAL AND SOMATIC DYSFUNCTION OF LUMBAR REGION: ICD-10-CM

## 2020-02-26 DIAGNOSIS — M99.01 SEGMENTAL AND SOMATIC DYSFUNCTION OF CERVICAL REGION: Primary | ICD-10-CM

## 2020-02-26 DIAGNOSIS — M99.02 SEGMENTAL AND SOMATIC DYSFUNCTION OF THORACIC REGION: ICD-10-CM

## 2020-02-26 PROCEDURE — 98941 CHIROPRACT MANJ 3-4 REGIONS: CPT | Mod: AT | Performed by: CHIROPRACTOR

## 2020-02-26 NOTE — PROGRESS NOTES
Subjective Finding:    Chief compalint: Patient presents with:  Neck Pain  Back Pain  , Pain Scale: 7/10, Intensity: sharp, Duration: 1 weeks, Radiating: no.    Date of injury:     Activities that the pain restricts:   Home/household/hobbies/social activities: yes.  Work duties: yes.  Sleep: yes.  Makes symptoms better: rest.  Makes symptoms worse: activity.  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 Yes: MD.  Work related: no.  Automobile related injury: no.    Objective and Assessment:    Posture Analysis:   High shoulder: .  Head tilt: .  High iliac crest: .  Head carriage: forward.  Thoracic Kyphosis: neutral.  Lumbar Lordosis: forward.    Lumbar Range of Motion: extension decreased.  Cervical Range of Motion: extension decreased.  Thoracic Range of Motion: extension decreased.  Extremity Range of Motion: .    Palpation:   Quad lumb: bilateral, referred pain: no  Lev scapulae: dull pain, referred pain: no    Segmental dysfunction pre-treatment and treatment area: C3, C4, T5, L4 and L5.    Assessment post-treatment:  Cervical: ROM increased.  Thoracic: ROM increased.  Lumbar: ROM increased.    Comments: .  Post pregnancy    Complicating Factors: .    Procedure(s):  CMT:  67145 Chiropractic manipulative treatment 3-4 regions performed   Cervical: Diversified, See above for level, Supine, Thoracic: Diversified, See above for level, Prone and Lumbar: Diversified, See above for level, Side posture    Modalities:  None performed this visit    Therapeutic procedures:  None    Plan:  Treatment plan: PRN.  Instructed patient: stretch as instructed at visit.  Short term goals: reduce pain.  Long term goals: restore normal function.  Prognosis: excellent.

## 2020-03-02 NOTE — PROGRESS NOTES
"Johana Duff is a 27 year old female is 6 weeks post partum  /68 (Cuff Size: Adult Regular)  Ht 5' 7\" (1.702 m)  Wt 154 lb (69.9 kg)  LMP 02/02/2017  BMI 24.12 kg/m2  well  Breast feeding:  y        Baby name: Purvi   Spontaneous vaginal delivery: y  Stitches: y   PHQ9:  0  Bleeding:  stopped  Vulva:  kegels  Family planning:  condoms  Other concerns:  prolapse    Pelvic:  Vagina and vulva are normal; well healed, no discharge is noted.    Cervix: normal without lesions.    Uterus:   mobile,  normal in size and shape without tenderness.  Adnexa: without masses or tenderness.    Assessment:  Pp and family planning    Plan:   rto annual    Greater than 15 minutes were spent with this patient  Vicky Benavidez MD    " DC instructions

## 2020-03-23 ENCOUNTER — OFFICE VISIT (OUTPATIENT)
Dept: CHIROPRACTIC MEDICINE | Facility: OTHER | Age: 30
End: 2020-03-23
Attending: CHIROPRACTOR
Payer: COMMERCIAL

## 2020-03-23 DIAGNOSIS — M99.02 SEGMENTAL AND SOMATIC DYSFUNCTION OF THORACIC REGION: ICD-10-CM

## 2020-03-23 DIAGNOSIS — M99.03 SEGMENTAL AND SOMATIC DYSFUNCTION OF LUMBAR REGION: Primary | ICD-10-CM

## 2020-03-23 DIAGNOSIS — M54.50 ACUTE BILATERAL LOW BACK PAIN WITHOUT SCIATICA: ICD-10-CM

## 2020-03-23 DIAGNOSIS — M99.01 SEGMENTAL AND SOMATIC DYSFUNCTION OF CERVICAL REGION: ICD-10-CM

## 2020-03-23 PROCEDURE — 98941 CHIROPRACT MANJ 3-4 REGIONS: CPT | Mod: AT | Performed by: CHIROPRACTOR

## 2020-03-24 NOTE — PROGRESS NOTES

## 2020-06-10 ENCOUNTER — OFFICE VISIT (OUTPATIENT)
Dept: CHIROPRACTIC MEDICINE | Facility: OTHER | Age: 30
End: 2020-06-10
Attending: CHIROPRACTOR
Payer: COMMERCIAL

## 2020-06-10 DIAGNOSIS — M99.02 SEGMENTAL AND SOMATIC DYSFUNCTION OF THORACIC REGION: ICD-10-CM

## 2020-06-10 DIAGNOSIS — M99.01 SEGMENTAL AND SOMATIC DYSFUNCTION OF CERVICAL REGION: ICD-10-CM

## 2020-06-10 DIAGNOSIS — M99.03 SEGMENTAL AND SOMATIC DYSFUNCTION OF LUMBAR REGION: Primary | ICD-10-CM

## 2020-06-10 DIAGNOSIS — M54.50 ACUTE BILATERAL LOW BACK PAIN WITHOUT SCIATICA: ICD-10-CM

## 2020-06-10 PROCEDURE — 98941 CHIROPRACT MANJ 3-4 REGIONS: CPT | Mod: AT | Performed by: CHIROPRACTOR

## 2020-06-11 NOTE — PROGRESS NOTES

## 2020-07-01 ENCOUNTER — OFFICE VISIT (OUTPATIENT)
Dept: CHIROPRACTIC MEDICINE | Facility: OTHER | Age: 30
End: 2020-07-01
Attending: CHIROPRACTOR
Payer: COMMERCIAL

## 2020-07-01 DIAGNOSIS — M99.02 SEGMENTAL AND SOMATIC DYSFUNCTION OF THORACIC REGION: ICD-10-CM

## 2020-07-01 DIAGNOSIS — M54.50 ACUTE BILATERAL LOW BACK PAIN WITHOUT SCIATICA: ICD-10-CM

## 2020-07-01 DIAGNOSIS — M99.03 SEGMENTAL AND SOMATIC DYSFUNCTION OF LUMBAR REGION: Primary | ICD-10-CM

## 2020-07-01 DIAGNOSIS — M99.01 SEGMENTAL AND SOMATIC DYSFUNCTION OF CERVICAL REGION: ICD-10-CM

## 2020-07-01 PROCEDURE — 98941 CHIROPRACT MANJ 3-4 REGIONS: CPT | Mod: AT | Performed by: CHIROPRACTOR

## 2020-07-02 NOTE — PROGRESS NOTES

## 2020-08-12 ENCOUNTER — OFFICE VISIT (OUTPATIENT)
Dept: CHIROPRACTIC MEDICINE | Facility: OTHER | Age: 30
End: 2020-08-12
Attending: CHIROPRACTOR
Payer: COMMERCIAL

## 2020-08-12 DIAGNOSIS — M99.02 SEGMENTAL AND SOMATIC DYSFUNCTION OF THORACIC REGION: ICD-10-CM

## 2020-08-12 DIAGNOSIS — M99.03 SEGMENTAL AND SOMATIC DYSFUNCTION OF LUMBAR REGION: ICD-10-CM

## 2020-08-12 DIAGNOSIS — M54.2 CERVICALGIA: ICD-10-CM

## 2020-08-12 DIAGNOSIS — M99.01 SEGMENTAL AND SOMATIC DYSFUNCTION OF CERVICAL REGION: Primary | ICD-10-CM

## 2020-08-12 PROCEDURE — 98941 CHIROPRACT MANJ 3-4 REGIONS: CPT | Mod: AT | Performed by: CHIROPRACTOR

## 2020-08-17 NOTE — PROGRESS NOTES
Subjective Finding:    Chief compalint: Patient presents with:  Neck Pain  Back Pain  , Pain Scale: 7/10, Intensity: sharp, Duration: 1 weeks, Radiating: no.    Date of injury:     Activities that the pain restricts:   Home/household/hobbies/social activities: yes.  Work duties: yes.  Sleep: yes.  Makes symptoms better: rest.  Makes symptoms worse: activity.  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 Yes: MD.  Work related: no.  Automobile related injury: no.    Objective and Assessment:    Posture Analysis:   High shoulder: .  Head tilt: .  High iliac crest: .  Head carriage: forward.  Thoracic Kyphosis: neutral.  Lumbar Lordosis: forward.    Lumbar Range of Motion: extension decreased.  Cervical Range of Motion: extension decreased.  Thoracic Range of Motion: extension decreased.  Extremity Range of Motion: .    Palpation:   Quad lumb: bilateral, referred pain: no  Lev scapulae: dull pain, referred pain: no    Segmental dysfunction pre-treatment and treatment area: C3, C4, T5, L4 and L5.    Assessment post-treatment:  Cervical: ROM increased.  Thoracic: ROM increased.  Lumbar: ROM increased.    Comments: .  Post pregnancy    Complicating Factors: .    Procedure(s):  CMT:  72417 Chiropractic manipulative treatment 3-4 regions performed   Cervical: Diversified, See above for level, Supine, Thoracic: Diversified, See above for level, Prone and Lumbar: Diversified, See above for level, Side posture    Modalities:  None performed this visit    Therapeutic procedures:  None    Plan:  Treatment plan: PRN.  Instructed patient: stretch as instructed at visit.  Short term goals: reduce pain.  Long term goals: restore normal function.  Prognosis: excellent.

## 2020-08-19 ENCOUNTER — OFFICE VISIT (OUTPATIENT)
Dept: CHIROPRACTIC MEDICINE | Facility: OTHER | Age: 30
End: 2020-08-19
Attending: CHIROPRACTOR
Payer: COMMERCIAL

## 2020-08-19 DIAGNOSIS — M99.02 SEGMENTAL AND SOMATIC DYSFUNCTION OF THORACIC REGION: ICD-10-CM

## 2020-08-19 DIAGNOSIS — M99.03 SEGMENTAL AND SOMATIC DYSFUNCTION OF LUMBAR REGION: Primary | ICD-10-CM

## 2020-08-19 DIAGNOSIS — M54.50 ACUTE BILATERAL LOW BACK PAIN WITHOUT SCIATICA: ICD-10-CM

## 2020-08-19 DIAGNOSIS — M99.01 SEGMENTAL AND SOMATIC DYSFUNCTION OF CERVICAL REGION: ICD-10-CM

## 2020-08-19 PROCEDURE — 98941 CHIROPRACT MANJ 3-4 REGIONS: CPT | Mod: AT | Performed by: CHIROPRACTOR

## 2020-08-24 NOTE — PROGRESS NOTES

## 2020-08-25 LAB — COPATH REPORT: NORMAL

## 2020-08-26 NOTE — NURSING NOTE
"Chief Complaint   Patient presents with     Post Partum Exam     2 weeks        Initial /58 (BP Location: Left arm, Patient Position: Sitting, Cuff Size: Adult Regular)   Pulse 78   Ht 1.702 m (5' 7\")   Wt 69.4 kg (153 lb)   LMP  (LMP Unknown)   SpO2 98%   BMI 23.96 kg/m   Estimated body mass index is 23.96 kg/m  as calculated from the following:    Height as of this encounter: 1.702 m (5' 7\").    Weight as of this encounter: 69.4 kg (153 lb).  Medication Reconciliation: complete    Chani Whitman LPN    " Patient Name: Edvin Alanis  Procedure Date: 8/26/2020 7:33 AM  MRN: [id number]  Account Number: [id number]  YOB: 1951  Admit Type: Outpatient  Age: 68  Gender: Male  Attending MD: Jennifer H Frankel , MD  Grafts or Implants: None  Blood Administered: None  Procedure:            Colonoscopy  Indications:          Screening for average risk colorectal malignant                        neoplasm, last colonoscopy 10 years ago per patient                        official report is not available  Providers:            Jennifer H. Frankel, MD  Attending Participation:       I personally performed the entire procedure.  Sedation:             Monitored Anesthesia Care  Procedure:       Pre-Anesthesia Assessment:       - The anesthesia plan was to use monitored anesthesia care (MAC).       A History and Physical was performed prior to the procedure. Patient       medications and allergies were reviewed. Informed consent was obtained       from the patient including discussion of the risks, benefits,       alternatives to the procedure (including the potentially life       threatening risk of bleeding, perforation, missed lesions and sedation).       Questions were answered. A time out was completed. Patient       identification and proposed procedure were verified. The patient was       deemed in satisfactory condition to undergo the procedure. The heart       rate, respiratory rate, oxygen saturations, blood pressure and response       to sedation were monitored throughout the procedure.       The endoscope was passed under direct vision. The Colonoscope was       introduced through the anus and advanced to the terminal ileum, with       identification of the appendiceal orifice and IC valve. The scope was       withdrawn and retroflexion was performed in the rectum. The physical       status of the patient was re-assessed after the procedure. The       colonoscopy was performed without difficulty. The patient  tolerated the       procedure well. The quality of the bowel preparation was good.  Scope Withdrawal Time 0 hours 13 minutes 9 seconds  Scope In: 7:50:49 AM  Scope Out: 8:10:33 AM  Findings:       The perianal and digital rectal examinations were normal.       Internal hemorrhoids were found during retroflexion.       A few diverticula were found in the colon.       The terminal ileum appeared normal.       A 6 mm polyp was found in the transverse colon. The polyp was sessile.       The polyp was removed with a cold biopsy forceps. Resection and       retrieval were complete.       Three sessile polyps were found in the sigmoid colon. The polyps were 3       to 4 mm in size. These polyps were removed with a cold biopsy forceps.       Resection and retrieval were complete.  Impression:       As above  Recommendation:       - Repeat colonoscopy date to be determined after pending pathology       results are reviewed.       - Await pathology results.  Complications:        No immediate complications.  Estimated Blood Loss: Estimated blood loss: none.  Jennifer H. Frankel, MD Jennifer H Frankel, MD  8/26/2020 9:03:31 AM  This report has been signed electronically by the above.  Number of Addenda: 0       [id number] [place] [address]

## 2020-08-27 ENCOUNTER — OFFICE VISIT (OUTPATIENT)
Dept: CHIROPRACTIC MEDICINE | Facility: OTHER | Age: 30
End: 2020-08-27
Attending: CHIROPRACTOR
Payer: COMMERCIAL

## 2020-08-27 DIAGNOSIS — M99.02 SEGMENTAL AND SOMATIC DYSFUNCTION OF THORACIC REGION: ICD-10-CM

## 2020-08-27 DIAGNOSIS — M99.03 SEGMENTAL AND SOMATIC DYSFUNCTION OF LUMBAR REGION: ICD-10-CM

## 2020-08-27 DIAGNOSIS — M99.01 SEGMENTAL AND SOMATIC DYSFUNCTION OF CERVICAL REGION: Primary | ICD-10-CM

## 2020-08-27 DIAGNOSIS — M54.2 CERVICALGIA: ICD-10-CM

## 2020-08-27 PROCEDURE — 98941 CHIROPRACT MANJ 3-4 REGIONS: CPT | Mod: AT | Performed by: CHIROPRACTOR

## 2020-08-31 NOTE — PROGRESS NOTES

## 2020-09-02 ENCOUNTER — OFFICE VISIT (OUTPATIENT)
Dept: CHIROPRACTIC MEDICINE | Facility: OTHER | Age: 30
End: 2020-09-02
Attending: CHIROPRACTOR
Payer: COMMERCIAL

## 2020-09-02 DIAGNOSIS — M99.01 SEGMENTAL AND SOMATIC DYSFUNCTION OF CERVICAL REGION: Primary | ICD-10-CM

## 2020-09-02 DIAGNOSIS — M99.03 SEGMENTAL AND SOMATIC DYSFUNCTION OF LUMBAR REGION: ICD-10-CM

## 2020-09-02 DIAGNOSIS — M54.2 CERVICALGIA: ICD-10-CM

## 2020-09-02 DIAGNOSIS — M99.02 SEGMENTAL AND SOMATIC DYSFUNCTION OF THORACIC REGION: ICD-10-CM

## 2020-09-02 PROCEDURE — 98941 CHIROPRACT MANJ 3-4 REGIONS: CPT | Mod: AT | Performed by: CHIROPRACTOR

## 2020-09-03 NOTE — PROGRESS NOTES
Subjective Finding:    Chief compalint: Patient presents with:  Neck Pain  Back Pain  , Pain Scale: 7/10, Intensity: sharp, Duration: 1 weeks, Radiating: no.    Date of injury:     Activities that the pain restricts:   Home/household/hobbies/social activities: yes.  Work duties: yes.  Sleep: yes.  Makes symptoms better: rest.  Makes symptoms worse: activity.  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 Yes: MD.  Work related: no.  Automobile related injury: no.    Objective and Assessment:    Posture Analysis:   High shoulder: .  Head tilt: .  High iliac crest: .  Head carriage: forward.  Thoracic Kyphosis: neutral.  Lumbar Lordosis: forward.    Lumbar Range of Motion: extension decreased.  Cervical Range of Motion: extension decreased.  Thoracic Range of Motion: extension decreased.  Extremity Range of Motion: .    Palpation:   Quad lumb: bilateral, referred pain: no  Lev scapulae: dull pain, referred pain: no    Segmental dysfunction pre-treatment and treatment area: C3, C4, T5, L4 and L5.    Assessment post-treatment:  Cervical: ROM increased.  Thoracic: ROM increased.  Lumbar: ROM increased.    Comments: .  Post pregnancy    Complicating Factors: .    Procedure(s):  CMT:  84205 Chiropractic manipulative treatment 3-4 regions performed   Cervical: Diversified, See above for level, Supine, Thoracic: Diversified, See above for level, Prone and Lumbar: Diversified, See above for level, Side posture    Modalities:  None performed this visit    Therapeutic procedures:  None    Plan:  Treatment plan: PRN.  Instructed patient: stretch as instructed at visit.  Short term goals: reduce pain.  Long term goals: restore normal function.  Prognosis: excellent.

## 2020-09-09 ENCOUNTER — OFFICE VISIT (OUTPATIENT)
Dept: CHIROPRACTIC MEDICINE | Facility: OTHER | Age: 30
End: 2020-09-09
Attending: CHIROPRACTOR
Payer: COMMERCIAL

## 2020-09-09 DIAGNOSIS — M99.02 SEGMENTAL AND SOMATIC DYSFUNCTION OF THORACIC REGION: ICD-10-CM

## 2020-09-09 DIAGNOSIS — M54.50 ACUTE BILATERAL LOW BACK PAIN WITHOUT SCIATICA: ICD-10-CM

## 2020-09-09 DIAGNOSIS — M99.01 SEGMENTAL AND SOMATIC DYSFUNCTION OF CERVICAL REGION: ICD-10-CM

## 2020-09-09 DIAGNOSIS — M99.03 SEGMENTAL AND SOMATIC DYSFUNCTION OF LUMBAR REGION: Primary | ICD-10-CM

## 2020-09-09 PROCEDURE — 98941 CHIROPRACT MANJ 3-4 REGIONS: CPT | Mod: AT | Performed by: CHIROPRACTOR

## 2020-09-10 NOTE — PROGRESS NOTES

## 2020-09-30 ENCOUNTER — OFFICE VISIT (OUTPATIENT)
Dept: CHIROPRACTIC MEDICINE | Facility: OTHER | Age: 30
End: 2020-09-30
Attending: CHIROPRACTOR
Payer: COMMERCIAL

## 2020-09-30 DIAGNOSIS — M99.02 SEGMENTAL AND SOMATIC DYSFUNCTION OF THORACIC REGION: ICD-10-CM

## 2020-09-30 DIAGNOSIS — M99.01 SEGMENTAL AND SOMATIC DYSFUNCTION OF CERVICAL REGION: ICD-10-CM

## 2020-09-30 DIAGNOSIS — M99.03 SEGMENTAL AND SOMATIC DYSFUNCTION OF LUMBAR REGION: Primary | ICD-10-CM

## 2020-09-30 DIAGNOSIS — M54.50 ACUTE BILATERAL LOW BACK PAIN WITHOUT SCIATICA: ICD-10-CM

## 2020-09-30 PROCEDURE — 98941 CHIROPRACT MANJ 3-4 REGIONS: CPT | Mod: AT | Performed by: CHIROPRACTOR

## 2020-09-30 NOTE — PROGRESS NOTES
Subjective Finding:    Chief compalint: Patient presents with:  Neck Pain  Back Pain  , Pain Scale: 7/10, Intensity: sharp, Duration: 1 weeks, Radiating: no.    Date of injury:     Activities that the pain restricts:   Home/household/hobbies/social activities: yes.  Work duties: yes.  Sleep: yes.  Makes symptoms better: rest.  Makes symptoms worse: activity.  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 Yes: MD.  Work related: no.  Automobile related injury: no.    Objective and Assessment:    Posture Analysis:   High shoulder: .  Head tilt: .  High iliac crest: .  Head carriage: forward.  Thoracic Kyphosis: neutral.  Lumbar Lordosis: forward.    Lumbar Range of Motion: extension decreased.  Cervical Range of Motion: extension decreased.  Thoracic Range of Motion: extension decreased.  Extremity Range of Motion: .    Palpation:   Quad lumb: bilateral, referred pain: no  Lev scapulae: dull pain, referred pain: no    Segmental dysfunction pre-treatment and treatment area: C3, C4, T5, L4 and L5.    Assessment post-treatment:  Cervical: ROM increased.  Thoracic: ROM increased.  Lumbar: ROM increased.    Comments: .  Post pregnancy    Complicating Factors: .    Procedure(s):  CMT:  74594 Chiropractic manipulative treatment 3-4 regions performed   Cervical: Diversified, See above for level, Supine, Thoracic: Diversified, See above for level, Prone and Lumbar: Diversified, See above for level, Side posture    Modalities:  None performed this visit    Therapeutic procedures:  None    Plan:  Treatment plan: PRN.  Instructed patient: stretch as instructed at visit.  Short term goals: reduce pain.  Long term goals: restore normal function.  Prognosis: excellent.

## 2020-10-13 ENCOUNTER — OFFICE VISIT (OUTPATIENT)
Dept: CHIROPRACTIC MEDICINE | Facility: OTHER | Age: 30
End: 2020-10-13
Attending: CHIROPRACTOR
Payer: COMMERCIAL

## 2020-10-13 DIAGNOSIS — M99.03 SEGMENTAL AND SOMATIC DYSFUNCTION OF LUMBAR REGION: Primary | ICD-10-CM

## 2020-10-13 DIAGNOSIS — M99.02 SEGMENTAL AND SOMATIC DYSFUNCTION OF THORACIC REGION: ICD-10-CM

## 2020-10-13 DIAGNOSIS — M99.01 SEGMENTAL AND SOMATIC DYSFUNCTION OF CERVICAL REGION: ICD-10-CM

## 2020-10-13 DIAGNOSIS — M54.50 ACUTE BILATERAL LOW BACK PAIN WITHOUT SCIATICA: ICD-10-CM

## 2020-10-13 PROCEDURE — 98941 CHIROPRACT MANJ 3-4 REGIONS: CPT | Mod: AT | Performed by: CHIROPRACTOR

## 2020-10-19 NOTE — PROGRESS NOTES
Subjective Finding:    Chief compalint: Patient presents with:  Back Pain  , Pain Scale: 7/10, Intensity: sharp, Duration: 1 weeks, Radiating: no.    Date of injury:     Activities that the pain restricts:   Home/household/hobbies/social activities: yes.  Work duties: yes.  Sleep: yes.  Makes symptoms better: rest.  Makes symptoms worse: activity.  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 Yes: MD.  Work related: no.  Automobile related injury: no.    Objective and Assessment:    Posture Analysis:   High shoulder: .  Head tilt: .  High iliac crest: .  Head carriage: forward.  Thoracic Kyphosis: neutral.  Lumbar Lordosis: forward.    Lumbar Range of Motion: extension decreased.  Cervical Range of Motion: extension decreased.  Thoracic Range of Motion: extension decreased.  Extremity Range of Motion: .    Palpation:   Quad lumb: bilateral, referred pain: no  Lev scapulae: dull pain, referred pain: no    Segmental dysfunction pre-treatment and treatment area: C3, C4, T5, L4 and L5.    Assessment post-treatment:  Cervical: ROM increased.  Thoracic: ROM increased.  Lumbar: ROM increased.    Comments: .  Post pregnancy    Complicating Factors: .    Procedure(s):  CMT:  88289 Chiropractic manipulative treatment 3-4 regions performed   Cervical: Diversified, See above for level, Supine, Thoracic: Diversified, See above for level, Prone and Lumbar: Diversified, See above for level, Side posture    Modalities:  None performed this visit    Therapeutic procedures:  None    Plan:  Treatment plan: PRN.  Instructed patient: stretch as instructed at visit.  Short term goals: reduce pain.  Long term goals: restore normal function.  Prognosis: excellent.

## 2020-10-28 ENCOUNTER — OFFICE VISIT (OUTPATIENT)
Dept: CHIROPRACTIC MEDICINE | Facility: OTHER | Age: 30
End: 2020-10-28
Attending: CHIROPRACTOR
Payer: COMMERCIAL

## 2020-10-28 DIAGNOSIS — M99.03 SEGMENTAL AND SOMATIC DYSFUNCTION OF LUMBAR REGION: Primary | ICD-10-CM

## 2020-10-28 DIAGNOSIS — M99.01 SEGMENTAL AND SOMATIC DYSFUNCTION OF CERVICAL REGION: ICD-10-CM

## 2020-10-28 DIAGNOSIS — M99.02 SEGMENTAL AND SOMATIC DYSFUNCTION OF THORACIC REGION: ICD-10-CM

## 2020-10-28 DIAGNOSIS — M54.50 ACUTE BILATERAL LOW BACK PAIN WITHOUT SCIATICA: ICD-10-CM

## 2020-10-28 PROCEDURE — 98941 CHIROPRACT MANJ 3-4 REGIONS: CPT | Mod: AT | Performed by: CHIROPRACTOR

## 2020-10-29 NOTE — PROGRESS NOTES

## 2020-11-03 ENCOUNTER — OFFICE VISIT (OUTPATIENT)
Dept: CHIROPRACTIC MEDICINE | Facility: OTHER | Age: 30
End: 2020-11-03
Attending: CHIROPRACTOR
Payer: COMMERCIAL

## 2020-11-03 DIAGNOSIS — M99.03 SEGMENTAL AND SOMATIC DYSFUNCTION OF LUMBAR REGION: ICD-10-CM

## 2020-11-03 DIAGNOSIS — M99.01 SEGMENTAL AND SOMATIC DYSFUNCTION OF CERVICAL REGION: Primary | ICD-10-CM

## 2020-11-03 DIAGNOSIS — M99.02 SEGMENTAL AND SOMATIC DYSFUNCTION OF THORACIC REGION: ICD-10-CM

## 2020-11-03 DIAGNOSIS — M54.2 CERVICALGIA: ICD-10-CM

## 2020-11-03 PROCEDURE — 98941 CHIROPRACT MANJ 3-4 REGIONS: CPT | Mod: AT | Performed by: CHIROPRACTOR

## 2020-11-03 NOTE — PROGRESS NOTES

## 2020-11-12 ENCOUNTER — OFFICE VISIT (OUTPATIENT)
Dept: CHIROPRACTIC MEDICINE | Facility: OTHER | Age: 30
End: 2020-11-12
Attending: CHIROPRACTOR
Payer: COMMERCIAL

## 2020-11-12 DIAGNOSIS — M99.03 SEGMENTAL AND SOMATIC DYSFUNCTION OF LUMBAR REGION: Primary | ICD-10-CM

## 2020-11-12 DIAGNOSIS — M99.02 SEGMENTAL AND SOMATIC DYSFUNCTION OF THORACIC REGION: ICD-10-CM

## 2020-11-12 DIAGNOSIS — M54.50 ACUTE BILATERAL LOW BACK PAIN WITHOUT SCIATICA: ICD-10-CM

## 2020-11-12 DIAGNOSIS — M99.01 SEGMENTAL AND SOMATIC DYSFUNCTION OF CERVICAL REGION: ICD-10-CM

## 2020-11-12 PROCEDURE — 98941 CHIROPRACT MANJ 3-4 REGIONS: CPT | Mod: AT | Performed by: CHIROPRACTOR

## 2020-11-12 NOTE — PROGRESS NOTES

## 2020-11-18 ENCOUNTER — OFFICE VISIT (OUTPATIENT)
Dept: CHIROPRACTIC MEDICINE | Facility: OTHER | Age: 30
End: 2020-11-18
Attending: CHIROPRACTOR
Payer: COMMERCIAL

## 2020-11-18 DIAGNOSIS — M99.03 SEGMENTAL AND SOMATIC DYSFUNCTION OF LUMBAR REGION: Primary | ICD-10-CM

## 2020-11-18 DIAGNOSIS — M54.50 ACUTE BILATERAL LOW BACK PAIN WITHOUT SCIATICA: ICD-10-CM

## 2020-11-18 DIAGNOSIS — M99.01 SEGMENTAL AND SOMATIC DYSFUNCTION OF CERVICAL REGION: ICD-10-CM

## 2020-11-18 DIAGNOSIS — M99.02 SEGMENTAL AND SOMATIC DYSFUNCTION OF THORACIC REGION: ICD-10-CM

## 2020-11-18 PROCEDURE — 98941 CHIROPRACT MANJ 3-4 REGIONS: CPT | Mod: AT | Performed by: CHIROPRACTOR

## 2020-11-20 NOTE — PROGRESS NOTES

## 2020-12-02 ENCOUNTER — OFFICE VISIT (OUTPATIENT)
Dept: CHIROPRACTIC MEDICINE | Facility: OTHER | Age: 30
End: 2020-12-02
Attending: CHIROPRACTOR
Payer: COMMERCIAL

## 2020-12-02 DIAGNOSIS — M99.01 SEGMENTAL AND SOMATIC DYSFUNCTION OF CERVICAL REGION: ICD-10-CM

## 2020-12-02 DIAGNOSIS — M99.02 SEGMENTAL AND SOMATIC DYSFUNCTION OF THORACIC REGION: ICD-10-CM

## 2020-12-02 DIAGNOSIS — M99.03 SEGMENTAL AND SOMATIC DYSFUNCTION OF LUMBAR REGION: Primary | ICD-10-CM

## 2020-12-02 DIAGNOSIS — M54.50 ACUTE BILATERAL LOW BACK PAIN WITHOUT SCIATICA: ICD-10-CM

## 2020-12-02 PROCEDURE — 98941 CHIROPRACT MANJ 3-4 REGIONS: CPT | Mod: AT | Performed by: CHIROPRACTOR

## 2020-12-07 ENCOUNTER — OFFICE VISIT (OUTPATIENT)
Dept: CHIROPRACTIC MEDICINE | Facility: OTHER | Age: 30
End: 2020-12-07
Attending: CHIROPRACTOR
Payer: COMMERCIAL

## 2020-12-07 DIAGNOSIS — M99.03 SEGMENTAL AND SOMATIC DYSFUNCTION OF LUMBAR REGION: ICD-10-CM

## 2020-12-07 DIAGNOSIS — M99.01 SEGMENTAL AND SOMATIC DYSFUNCTION OF CERVICAL REGION: Primary | ICD-10-CM

## 2020-12-07 DIAGNOSIS — M54.2 CERVICALGIA: ICD-10-CM

## 2020-12-07 DIAGNOSIS — M99.02 SEGMENTAL AND SOMATIC DYSFUNCTION OF THORACIC REGION: ICD-10-CM

## 2020-12-07 PROCEDURE — 98941 CHIROPRACT MANJ 3-4 REGIONS: CPT | Mod: AT | Performed by: CHIROPRACTOR

## 2020-12-07 NOTE — PROGRESS NOTES

## 2020-12-09 NOTE — PROGRESS NOTES

## 2020-12-16 ENCOUNTER — OFFICE VISIT (OUTPATIENT)
Dept: CHIROPRACTIC MEDICINE | Facility: OTHER | Age: 30
End: 2020-12-16
Attending: CHIROPRACTOR
Payer: COMMERCIAL

## 2020-12-16 DIAGNOSIS — M99.02 SEGMENTAL AND SOMATIC DYSFUNCTION OF THORACIC REGION: ICD-10-CM

## 2020-12-16 DIAGNOSIS — M99.01 SEGMENTAL AND SOMATIC DYSFUNCTION OF CERVICAL REGION: ICD-10-CM

## 2020-12-16 DIAGNOSIS — M54.50 ACUTE BILATERAL LOW BACK PAIN WITHOUT SCIATICA: ICD-10-CM

## 2020-12-16 DIAGNOSIS — M99.03 SEGMENTAL AND SOMATIC DYSFUNCTION OF LUMBAR REGION: Primary | ICD-10-CM

## 2020-12-16 PROCEDURE — 98941 CHIROPRACT MANJ 3-4 REGIONS: CPT | Mod: AT | Performed by: CHIROPRACTOR

## 2020-12-18 NOTE — PROGRESS NOTES

## 2021-01-02 ENCOUNTER — APPOINTMENT (OUTPATIENT)
Dept: GENERAL RADIOLOGY | Facility: HOSPITAL | Age: 31
End: 2021-01-02
Attending: NURSE PRACTITIONER
Payer: COMMERCIAL

## 2021-01-02 ENCOUNTER — APPOINTMENT (OUTPATIENT)
Dept: CT IMAGING | Facility: HOSPITAL | Age: 31
End: 2021-01-02
Attending: NURSE PRACTITIONER
Payer: COMMERCIAL

## 2021-01-02 ENCOUNTER — HOSPITAL ENCOUNTER (EMERGENCY)
Facility: HOSPITAL | Age: 31
Discharge: HOME | End: 2021-01-02
Attending: NURSE PRACTITIONER | Admitting: NURSE PRACTITIONER
Payer: COMMERCIAL

## 2021-01-02 VITALS
SYSTOLIC BLOOD PRESSURE: 145 MMHG | DIASTOLIC BLOOD PRESSURE: 84 MMHG | TEMPERATURE: 98.6 F | HEART RATE: 110 BPM | RESPIRATION RATE: 14 BRPM | OXYGEN SATURATION: 100 %

## 2021-01-02 DIAGNOSIS — M51.369 L4-L5 DISC BULGE: ICD-10-CM

## 2021-01-02 DIAGNOSIS — M54.50 ACUTE RIGHT-SIDED LOW BACK PAIN WITHOUT SCIATICA: Primary | ICD-10-CM

## 2021-01-02 PROCEDURE — 250N000011 HC RX IP 250 OP 636: Performed by: NURSE PRACTITIONER

## 2021-01-02 PROCEDURE — 72131 CT LUMBAR SPINE W/O DYE: CPT

## 2021-01-02 PROCEDURE — 72100 X-RAY EXAM L-S SPINE 2/3 VWS: CPT

## 2021-01-02 PROCEDURE — 99214 OFFICE O/P EST MOD 30 MIN: CPT | Performed by: NURSE PRACTITIONER

## 2021-01-02 PROCEDURE — 96372 THER/PROPH/DIAG INJ SC/IM: CPT | Performed by: NURSE PRACTITIONER

## 2021-01-02 PROCEDURE — G0463 HOSPITAL OUTPT CLINIC VISIT: HCPCS | Mod: 25

## 2021-01-02 PROCEDURE — 250N000013 HC RX MED GY IP 250 OP 250 PS 637: Performed by: NURSE PRACTITIONER

## 2021-01-02 RX ORDER — OXYCODONE HYDROCHLORIDE 5 MG/1
5 TABLET ORAL EVERY 6 HOURS PRN
Qty: 18 TABLET | Refills: 0 | Status: SHIPPED | OUTPATIENT
Start: 2021-01-02 | End: 2021-01-25

## 2021-01-02 RX ORDER — KETOROLAC TROMETHAMINE 30 MG/ML
60 INJECTION, SOLUTION INTRAMUSCULAR; INTRAVENOUS ONCE
Status: COMPLETED | OUTPATIENT
Start: 2021-01-02 | End: 2021-01-02

## 2021-01-02 RX ORDER — OXYCODONE HYDROCHLORIDE 5 MG/1
5 TABLET ORAL ONCE
Status: COMPLETED | OUTPATIENT
Start: 2021-01-02 | End: 2021-01-02

## 2021-01-02 RX ADMIN — KETOROLAC TROMETHAMINE 60 MG: 30 INJECTION, SOLUTION INTRAMUSCULAR at 13:57

## 2021-01-02 RX ADMIN — OXYCODONE HYDROCHLORIDE 5 MG: 5 TABLET ORAL at 14:40

## 2021-01-02 ASSESSMENT — ENCOUNTER SYMPTOMS: BACK PAIN: 1

## 2021-01-02 NOTE — ED PROVIDER NOTES
History     Chief Complaint   Patient presents with     Back Pain     HPI  Johana Duff is a 30 year old female who presents to urgent care for concerns of low back pain.  Patient reports that she was doing some housework today when she had sudden onset of right sided low back pain.  Pain is worse on her right side if she sits up straight.  It is more comfortable for her to sit on her left buttock cheek.  No fevers or chills.  Denies saddle anesthesia, urinary retention, bowel bladder incontinence.  She does normally see a chiropractor once every week with next appointment scheduled for 2021.  No known injury to her back.  No history of back surgeries.  Patient has not taken anything for pain.    Allergies:  Allergies   Allergen Reactions     Blue Dyes      Red Dye        Problem List:    Patient Active Problem List    Diagnosis Date Noted     Atypical nevi 10/10/2019     Priority: Medium     Threatened labor 2019     Priority: Medium      (spontaneous vaginal delivery) 2019     Priority: Medium     Ember Beverly  8 lb 5.5 oz  Small 2nd degree with repair       Insufficient endocervical or transformation zone component in cervical specimen 2018     Priority: Medium     Use cytobrush       Bilateral carpal tunnel syndrome 10/10/2017     Priority: Medium     Plantar fasciitis 2017     Priority: Medium     Cervicalgia 2017     Priority: Medium     Congenital pectus excavatum 2013     Priority: Medium        Past Medical History:    Past Medical History:   Diagnosis Date     Melanoma (H) 10/2019     Ovarian cyst 2002       Past Surgical History:    Past Surgical History:   Procedure Laterality Date     wisdom teeth extraction         Family History:    Family History   Problem Relation Age of Onset     Cerebrovascular Disease Maternal Grandmother         complications     Hypertension Maternal Grandmother      Family History Negative Mother      Coronary Artery Disease  Maternal Grandfather         s/p stents     Lung Cancer Maternal Grandfather         +tobacco     Family History Negative Father         lymes     Family History Negative Sister      Emphysema Paternal Grandmother         +tobacco     Family History Negative Sister      Family History Negative Sister      Emphysema Paternal Grandfather         +tobacco       Social History:  Marital Status:   [2]  Social History     Tobacco Use     Smoking status: Never Smoker     Smokeless tobacco: Never Used   Substance Use Topics     Alcohol use: Yes     Comment: 3/year     Drug use: No        Medications:         cholecalciferol (VITAMIN D3) 5000 units TABS tablet       oxyCODONE (ROXICODONE) 5 MG tablet       Prenatal MV-Min-Fe Fum-FA-DHA (PRENATAL 1 PO)          Review of Systems   Musculoskeletal: Positive for back pain. Negative for gait problem.   All other systems reviewed and are negative.      Physical Exam   BP: 145/84  Pulse: 110(crying)  Temp: 98.6  F (37  C)  Resp: 14  SpO2: 100 %      Physical Exam  Vitals signs and nursing note reviewed.   Constitutional:       General: She is in acute distress.      Appearance: She is not ill-appearing or toxic-appearing.      Comments: Patient is sitting in a wheelchair leaning towards the left side in order to avoid applying pressure to her right buttock cheek.   HENT:      Head: Normocephalic and atraumatic.   Eyes:      Pupils: Pupils are equal, round, and reactive to light.   Neck:      Musculoskeletal: Neck supple.   Cardiovascular:      Rate and Rhythm: Regular rhythm. Tachycardia present.      Heart sounds: Normal heart sounds.      Comments: Mild tachycardia  Pulmonary:      Effort: Pulmonary effort is normal. No respiratory distress.      Breath sounds: Normal breath sounds. No rhonchi.   Abdominal:      Palpations: Abdomen is soft.   Musculoskeletal:         General: Tenderness present.      Lumbar back: She exhibits decreased range of motion, tenderness and  pain. She exhibits no swelling, no edema and no deformity.      Comments: TTP to mid-right lumbar spine.  No step-offs or edema.  No obvious deformity, erythema or bruising to the affected area.   Skin:     General: Skin is warm and dry.      Capillary Refill: Capillary refill takes less than 2 seconds.   Neurological:      Mental Status: She is alert and oriented to person, place, and time.         ED Course     ED Course as of Jan 02 1733   Sat Jan 02, 2021   1555 Patient continues to report 9/10 pain.  CT scan just resulted showing a mild disc bulge at L4-L5 with no nerve compression.  Will reevaluate patient.      1609 Discussed CT results with patient.  Patient is now lying supine on the bed and states she feels much more comfortable.  Rating pain at 5/10.  Patient feels comfortable to be discharged home with pain medication.         Procedures            Results for orders placed or performed during the hospital encounter of 01/02/21 (from the past 24 hour(s))   Lumbar spine XR, 2-3 views    Narrative    PROCEDURE: XR LUMBAR SPINE 2-3 VIEWS 1/2/2021 2:12 PM    HISTORY: sudden onset of right-mid low back pain worse with sitting up  straight. Regularly goes to chiropractor. No known injury.    COMPARISONS: None.    TECHNIQUE: AP and lateral    FINDINGS: There is mild lumbar scoliosis concave right. The lumbar  discs are normal in height. The vertebral bodies and arches are  intact. Sacrum and sacroiliac joints appear normal.         Impression    IMPRESSION: Mild lumbar scoliosis    MEHDI LEE MD   Lumbar spine CT w/o contrast    Narrative    PROCEDURE: CT LUMBAR SPINE W/O CONTRAST 1/2/2021 3:13 PM    HISTORY: right-mid low back pain started suddenly today, unable to sit  up straight. R/o bulging disc    COMPARISONS: None.    Meds/Dose Given:    TECHNIQUE: CT scan of the lumbar spine with sagittal and coronal  reconstructions    FINDINGS: The T12-L1, L1-L2, L2-L3 discs are normal.    There is some mild  annular bulging at L3-L4 without significant thecal  sac or nerve root compression.    At L4-L5 there is some mild annular bulging most focally seen just to  the left of midline. This mildly impinges on the ventral aspect of the  subarachnoid space but no nerve root compression is seen.    The L5-S1 disc appears normal.    Lumbar facet joints are normal.    Intradurally no abnormalities are seen. The paravertebral soft tissues  appear normal.         Impression    IMPRESSION: Focal disc protrusion at L4-L5 centrally and to the left.  The disc protrusion mildly flattens the ventral aspect of the  subarachnoid space . No nerve root compression is seen    MEHDI LEE MD       Medications   ketorolac (TORADOL) injection 60 mg (60 mg Intramuscular Given 1/2/21 1357)   oxyCODONE (ROXICODONE) tablet 5 mg (5 mg Oral Given 1/2/21 1440)       Assessments & Plan (with Medical Decision Making)     I have reviewed the nursing notes.    I have reviewed the findings, diagnosis, plan and need for follow up with the patient.  Pleasant 30-year-old female that presented to urgent care for a sudden onset of right-sided low back pain which began while she was doing some house chores.  Physical exam findings as noted above.  Patient was in acute distress and a severe pain.  She had not taken anything for pain prior to this arrival.  Toradol IM was given in urgent care with minimal effectiveness in patient's pain.  Oxycodone was given with improvement in patient's pain to 5/10.  Patient denied any saddle anesthesia, urinary retention, bowel bladder incontinence.  No weakness to her legs.  Lumbar spine x-ray showed mild scoliosis.  Considering patient was still in significant pain opted to do a CT lumbar which showed a mild  disc bulge at L4-L5 with no nerve root compression (see full report above).  I did offer to give patient some steroids which she declined.  Will prescribe oxycodone for severe pain.  Recommended ibuprofen or  Tylenol, applying heat alternating with cold and rest.  Patient can follow-up with her chiropractor on Monday as scheduled.  Advised her to return immediately to emergency department for worsening or concerning symptoms.  Patient verbalized understanding and is in agreement with this plan of care.    This document was prepared using a combination of typing and voice generated software.  While every attempt was made for accuracy, spelling and grammatical errors may exist.    Discharge Medication List as of 1/2/2021  4:13 PM      START taking these medications    Details   oxyCODONE (ROXICODONE) 5 MG tablet Take 1 tablet (5 mg) by mouth every 6 hours as needed for pain or moderate to severe pain, Disp-18 tablet, R-0, E-Prescribe             Final diagnoses:   L4-L5 disc bulge   Acute right-sided low back pain without sciatica       1/2/2021   [facility]     Mpofu, Prudence, CNP  01/02/21 0099

## 2021-01-02 NOTE — ED AVS SNAPSHOT
[facility]  [address] [id number]  Phone: [phone]                                    Johana Duff   MRN: [id number]    Department: [facility]   Date of Visit: 1/2/2021           After Visit Summary Signature Page    I have received my discharge instructions, and my questions have been answered. I have discussed any challenges I see with this plan with the nurse or doctor.    ..........................................................................................................................................  Patient/Patient Representative Signature      ..........................................................................................................................................  Patient Representative Print Name and Relationship to Patient    ..................................................               ................................................  Date                                   Time    ..........................................................................................................................................  Reviewed by Signature/Title    ...................................................              ..............................................  Date                                               Time          22EPIC Rev 08/18

## 2021-01-02 NOTE — ED TRIAGE NOTES
Pt presents today with c/o lower back pain. Pt states she sees a chiropractor regularly, but over the holidays has not been able to see one. States recently she was cleaning the house and started to get his back pain, Worse with breathing per pt. No OTC medications.

## 2021-01-02 NOTE — DISCHARGE INSTRUCTIONS
Can take Tylenol or ibuprofen as needed for your pain.  Take oxycodone as needed for severe pain.      Apply heating pad alternating with cold to your back and take it easy for the next few days.    Keep scheduled appointment with your chiropractor for reevaluation.    Follow-up with your doctor as needed.

## 2021-01-04 ENCOUNTER — OFFICE VISIT (OUTPATIENT)
Dept: CHIROPRACTIC MEDICINE | Facility: OTHER | Age: 31
End: 2021-01-04
Attending: CHIROPRACTOR
Payer: COMMERCIAL

## 2021-01-04 DIAGNOSIS — M99.02 SEGMENTAL AND SOMATIC DYSFUNCTION OF THORACIC REGION: ICD-10-CM

## 2021-01-04 DIAGNOSIS — M99.01 SEGMENTAL AND SOMATIC DYSFUNCTION OF CERVICAL REGION: ICD-10-CM

## 2021-01-04 DIAGNOSIS — M99.03 SEGMENTAL AND SOMATIC DYSFUNCTION OF LUMBAR REGION: Primary | ICD-10-CM

## 2021-01-04 DIAGNOSIS — M54.50 ACUTE BILATERAL LOW BACK PAIN WITHOUT SCIATICA: ICD-10-CM

## 2021-01-04 PROCEDURE — 98941 CHIROPRACT MANJ 3-4 REGIONS: CPT | Mod: AT | Performed by: CHIROPRACTOR

## 2021-01-04 NOTE — PROGRESS NOTES
Subjective Finding:    Chief compalint: Patient presents with:  Back Pain: sharp pain in low back   , Pain Scale: 7/10, Intensity: sharp, Duration: 1 weeks, Radiating: no.    Date of injury:     Activities that the pain restricts:   Home/household/hobbies/social activities: yes.  Work duties: yes.  Sleep: yes.  Makes symptoms better: rest.  Makes symptoms worse: activity.  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 Yes: MD.  Work related: no.  Automobile related injury: no.    Objective and Assessment:    Posture Analysis:   High shoulder: .  Head tilt: .  High iliac crest: .  Head carriage: forward.  Thoracic Kyphosis: neutral.  Lumbar Lordosis: forward.    Lumbar Range of Motion: extension decreased.  Cervical Range of Motion: extension decreased.  Thoracic Range of Motion: extension decreased.  Extremity Range of Motion: .    Palpation:   Quad lumb: bilateral, referred pain: no  Lev scapulae: dull pain, referred pain: no    Segmental dysfunction pre-treatment and treatment area: C3, C4, T5, L4 and L5.    Assessment post-treatment:  Cervical: ROM increased.  Thoracic: ROM increased.  Lumbar: ROM increased.    Comments: .  Post pregnancy    Complicating Factors: .    Procedure(s):  CMT:  71745 Chiropractic manipulative treatment 3-4 regions performed   Cervical: Diversified, See above for level, Supine, Thoracic: Diversified, See above for level, Prone and Lumbar: Diversified, See above for level, Side posture    Modalities:  None performed this visit    Therapeutic procedures:  None    Plan:  Treatment plan: PRN.  Instructed patient: stretch as instructed at visit.  Short term goals: reduce pain.  Long term goals: restore normal function.  Prognosis: excellent.

## 2021-01-07 ENCOUNTER — OFFICE VISIT (OUTPATIENT)
Dept: CHIROPRACTIC MEDICINE | Facility: OTHER | Age: 31
End: 2021-01-07
Attending: CHIROPRACTOR
Payer: COMMERCIAL

## 2021-01-07 DIAGNOSIS — M99.01 SEGMENTAL AND SOMATIC DYSFUNCTION OF CERVICAL REGION: ICD-10-CM

## 2021-01-07 DIAGNOSIS — M99.03 SEGMENTAL AND SOMATIC DYSFUNCTION OF LUMBAR REGION: Primary | ICD-10-CM

## 2021-01-07 DIAGNOSIS — M99.02 SEGMENTAL AND SOMATIC DYSFUNCTION OF THORACIC REGION: ICD-10-CM

## 2021-01-07 DIAGNOSIS — M54.50 ACUTE BILATERAL LOW BACK PAIN WITHOUT SCIATICA: ICD-10-CM

## 2021-01-07 PROCEDURE — 98941 CHIROPRACT MANJ 3-4 REGIONS: CPT | Mod: AT | Performed by: CHIROPRACTOR

## 2021-01-08 ENCOUNTER — HOSPITAL ENCOUNTER (OUTPATIENT)
Dept: PHYSICAL THERAPY | Facility: HOSPITAL | Age: 31
Setting detail: THERAPIES SERIES
End: 2021-01-08
Attending: CHIROPRACTOR
Payer: COMMERCIAL

## 2021-01-08 DIAGNOSIS — M99.03 SEGMENTAL AND SOMATIC DYSFUNCTION OF LUMBAR REGION: ICD-10-CM

## 2021-01-08 PROCEDURE — 97161 PT EVAL LOW COMPLEX 20 MIN: CPT | Mod: GP

## 2021-01-08 PROCEDURE — 97110 THERAPEUTIC EXERCISES: CPT | Mod: GP

## 2021-01-08 NOTE — PROGRESS NOTES
Initial Physical Therapy Evaluations       Name: Johana Duff MRN# [id number]   Age: 30 year old YOB: 1990     Date of Consultation: January 8, 2021  Primary care provider: Debby Floyd    Referring Physician: Luis Park   Orders: Eval and Treat  Medical Diagnosis: Segmental and somatic dysfunction of lumbar region [M99.03]   Onset of Illness/Injury: Late 12/2020    Reason for PT Visit: Patient had recent onset of back pain last week of December. Patient was performing high level of household cleaning activities that involved a lot of bending and lifting. The past week the back had felt diffusely sore especially with childcare tasks, but that Saturday symptoms became much worse. She sat after cleaning tasks in cushioned rocking chair and felt large onset of pain that extended both to R and L and seemed to wrap into abdominal region. Hurt to breathe. All movements painful. Eventually patient went to Urgent Care. Was given pain medication, but this was not helpful with pain. Imaging performed.     Since that onset, pain has been improving but is still present. Patient is very aggravated with changing positions, bending, lifting, twisting and sleep is limited. Patient is experienced intermittent feelings of pin and needles in R foot. Sometimes a feeling of numbness is present in R foot. Decreased sleep also present as one of her children is teetching.     PMH: Hx of mild lumbar scoliosis and decreased cervical lordosis noted from past imaging. Uses chiropractor for headache management. Chiropractic services have been helpful in decreasing pain of regular and daily headaches.  Late December had onset of back pain.     Has been using ice at home.     Denies bowel and bladder changes related to back, denies saddle analgesia, denies leg weakness or foot drop.     Prior Level of Function: Prior to onset was not limited by back pain. Performed childcare tasks, cleaning, homemaking tasks without  pain.      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 2 young children involving lifting, bending and carrying.      Patient/Family Goal: to return to baseline and prevent this from happening again.     Fall Screen:   Have you fallen 2 or more times in the last year? No  Have you fallen and had an injury in the last year? No  Timed up & go: NT   Is patient a fall risk? No    Past Medical History:   Past Medical History:   Diagnosis Date     Melanoma (H) 10/2019     Ovarian cyst 2002    left       Past Surgical History:  Past Surgical History:   Procedure Laterality Date     wisdom teeth extraction  2014       Medications:   Current Outpatient Medications   Medication Sig     cholecalciferol (VITAMIN D3) 5000 units TABS tablet Take 5,000 Units by mouth daily     oxyCODONE (ROXICODONE) 5 MG tablet Take 1 tablet (5 mg) by mouth every 6 hours as needed for pain or moderate to severe pain     Prenatal MV-Min-Fe Fum-FA-DHA (PRENATAL 1 PO)      No current facility-administered medications for this encounter.        Imaging:   PROCEDURE: CT LUMBAR SPINE W/O CONTRAST 1/2/2021 3:13 PM     HISTORY: right-mid low back pain started suddenly today, unable to sit  up straight. R/o bulging disc     COMPARISONS: None.     Meds/Dose Given:     TECHNIQUE: CT scan of the lumbar spine with sagittal and coronal  reconstructions     FINDINGS: The T12-L1, L1-L2, L2-L3 discs are normal.     There is some mild annular bulging at L3-L4 without significant thecal  sac or nerve root compression.     At L4-L5 there is some mild annular bulging most focally seen just to  the left of midline. This mildly impinges on the ventral aspect of the  subarachnoid space but no nerve root compression is seen.     The L5-S1 disc appears normal.     Lumbar facet joints are normal.     Intradurally no abnormalities are seen. The paravertebral soft tissues  appear normal.                                                                         IMPRESSION: Focal disc protrusion at L4-L5 centrally and to the left.  The disc protrusion mildly flattens the ventral aspect of the  subarachnoid space . No nerve root compression is seen    Musculoskeletal Findings:   OBJECTIVE  Pain at rest: 2/10  Pain with activity: 5/10  Sharp, aching, shooting, intermittent pins and needles and sometimes feeling of numbness in R foot.   Aggravating Factors: bending, lifting, carrying, sleep, prolonged sitting, reaching overhead, certain positions, household tasks, childcare activities  Relieving Factors: sitting, walking, rest, changing positions, ice, OTC meds     Change in bowel/bladder: No  Numbness or tingling in groin area: No    Palpation: diffuse tenderness throughout lumbar paraspinals and lumbar segments. High sensitivity present.      Gait: normal, but guarded due to pain and slow christina.      Posture: Normal erect.  Sitting Posture: good  Standing Posture: good  Correction of posture: improved     Myotomes:   Right lower extremity:negative  Left lower extremity: negative     Dermatomes:   Right lower extremity: negative  Left lower extremity: negative     Range of Motion:  Active Lumbar Spine       Flexion: Major limitation in motion, pain reproduction in back       Extension: Major limitation in motion, pain reproduction in back (greatest limitation in motion)       Right sidebend: Major limitation in motion, pain reproduction in back       Left sidebend: Major limitation in motion, pain reproduction in back        Strength:    Right Lower Extremity Strength: Full   Left Lower Extremity Strength: Full     Light touch sensation intact and symmetrical during gross light touch screen today.      Special Tests:  Repeated Movement Testing: postural correction elicited improved extension and centralized symptoms.   Passive Intervertebral Accessory Movements: hypomobility present at L5, L4, L3, L2, L1, T12 segments.      Patient's Concordant  Sign: pain and limited mobility for all lumbar motions with extension the most limited, pain with bending     Outcome Measures:     Lb STarT  4 Medium  VITOR: 44%      Goals:   STG  20  STG 1: Patient will be independent and compliant with HEP and have good ability to decrease symptoms and prevent exacerbation.   STG 2: Patient will report improved tolerance to sleep and ability to find comfortable rest position.     LT20  LTG 1: Patient will be able to perform full range of lumbar mobility pain free and perform bending, lifting, reaching and carrying tasks at home with good mechanics without limitation from pain.   LTG 2: Patient will be able to perform prolonged sitting, standing and walking without limitation from pain.   LTG 3: Patient will be able to return to prior baseline without limitation from back pain.   LTG 4: Patient will be able to independently perform HEP and continue back health concepts to decrease risk of recurrence.       Planned Interventions: MDT concepts, back health concepts, manual techniques, patient education, HEP development.     Clinical Impressions:  Criteria for Skilled Therapeutic Intervention Met: Yes   PT Diagnosis: acute back pain.   Influenced by the following impairments: Pain, large limitations in lumbar mobility, poor tolerance for functional tasks   Functional limitations due to impairment: pain and difficulty with functional tasks of childcare, cleaning and recreational activities.   Clinical presentation: Stable/Uncomplicated  Clinical presentation rationale: clinical judgement   Clinical Decision making (complexity): Low Complexity  Predicted Duration of Therapy Intervention (days/wks): 12 weeks   Risks and Benefits of therapy have been explained: Yes  Patient, Family & other staff in agreement with plan of care: Yes  Comments: HEP: seated postural correction 6x/day (lumbar extension bias), supported lumbar role or towel for seated positions. Ice 3x/day for  10-15min duration. Avoid bending, lifting, twisting for 2 weeks when possible.     Total Evaluation Time: 24     I certify the need for these services furnished under this plan of treatment and while under my care. (Physician co-signature of this document indicates review and certification of the therapy plan).

## 2021-01-11 ENCOUNTER — HOSPITAL ENCOUNTER (OUTPATIENT)
Dept: PHYSICAL THERAPY | Facility: HOSPITAL | Age: 31
Setting detail: THERAPIES SERIES
End: 2021-01-11
Attending: CHIROPRACTOR
Payer: COMMERCIAL

## 2021-01-11 ENCOUNTER — OFFICE VISIT (OUTPATIENT)
Dept: CHIROPRACTIC MEDICINE | Facility: OTHER | Age: 31
End: 2021-01-11
Attending: CHIROPRACTOR
Payer: COMMERCIAL

## 2021-01-11 DIAGNOSIS — M99.01 SEGMENTAL AND SOMATIC DYSFUNCTION OF CERVICAL REGION: ICD-10-CM

## 2021-01-11 DIAGNOSIS — M99.02 SEGMENTAL AND SOMATIC DYSFUNCTION OF THORACIC REGION: ICD-10-CM

## 2021-01-11 DIAGNOSIS — M99.03 SEGMENTAL AND SOMATIC DYSFUNCTION OF LUMBAR REGION: Primary | ICD-10-CM

## 2021-01-11 DIAGNOSIS — M54.50 ACUTE BILATERAL LOW BACK PAIN WITHOUT SCIATICA: ICD-10-CM

## 2021-01-11 PROCEDURE — 97110 THERAPEUTIC EXERCISES: CPT | Mod: GP

## 2021-01-11 PROCEDURE — 98941 CHIROPRACT MANJ 3-4 REGIONS: CPT | Mod: AT | Performed by: CHIROPRACTOR

## 2021-01-11 NOTE — PROGRESS NOTES
Subjective Finding:    Chief compalint: Patient presents with:  Back Pain  , Pain Scale: 7/10, Intensity: sharp, Duration: 1 weeks, Radiating: no.    Date of injury:     Activities that the pain restricts:   Home/household/hobbies/social activities: yes.  Work duties: yes.  Sleep: yes.  Makes symptoms better: rest.  Makes symptoms worse: activity.  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 Yes: MD.  Work related: no.  Automobile related injury: no.    Objective and Assessment:    Posture Analysis:   High shoulder: .  Head tilt: .  High iliac crest: .  Head carriage: forward.  Thoracic Kyphosis: neutral.  Lumbar Lordosis: forward.    Lumbar Range of Motion: extension decreased.  Cervical Range of Motion: extension decreased.  Thoracic Range of Motion: extension decreased.  Extremity Range of Motion: .    Palpation:   Quad lumb: bilateral, referred pain: no  Lev scapulae: dull pain, referred pain: no    Segmental dysfunction pre-treatment and treatment area: C3, C4, T5, L4 and L5.    Assessment post-treatment:  Cervical: ROM increased.  Thoracic: ROM increased.  Lumbar: ROM increased.    Comments: .  Post pregnancy    Complicating Factors: .    Procedure(s):  CMT:  86964 Chiropractic manipulative treatment 3-4 regions performed   Cervical: Diversified, See above for level, Supine, Thoracic: Diversified, See above for level, Prone and Lumbar: Diversified, See above for level, Side posture    Modalities:  None performed this visit    Therapeutic procedures:  None    Plan:  Treatment plan: PRN.  Instructed patient: stretch as instructed at visit.  Short term goals: reduce pain.  Long term goals: restore normal function.  Prognosis: excellent.

## 2021-01-13 NOTE — PROGRESS NOTES

## 2021-01-18 ENCOUNTER — OFFICE VISIT (OUTPATIENT)
Dept: CHIROPRACTIC MEDICINE | Facility: OTHER | Age: 31
End: 2021-01-18
Attending: CHIROPRACTOR
Payer: COMMERCIAL

## 2021-01-18 ENCOUNTER — HOSPITAL ENCOUNTER (OUTPATIENT)
Dept: PHYSICAL THERAPY | Facility: HOSPITAL | Age: 31
Setting detail: THERAPIES SERIES
End: 2021-01-18
Attending: CHIROPRACTOR
Payer: COMMERCIAL

## 2021-01-18 DIAGNOSIS — M99.01 SEGMENTAL AND SOMATIC DYSFUNCTION OF CERVICAL REGION: ICD-10-CM

## 2021-01-18 DIAGNOSIS — M99.03 SEGMENTAL AND SOMATIC DYSFUNCTION OF LUMBAR REGION: Primary | ICD-10-CM

## 2021-01-18 DIAGNOSIS — M99.02 SEGMENTAL AND SOMATIC DYSFUNCTION OF THORACIC REGION: ICD-10-CM

## 2021-01-18 DIAGNOSIS — M54.50 ACUTE BILATERAL LOW BACK PAIN WITHOUT SCIATICA: ICD-10-CM

## 2021-01-18 PROCEDURE — 98941 CHIROPRACT MANJ 3-4 REGIONS: CPT | Mod: AT | Performed by: CHIROPRACTOR

## 2021-01-18 PROCEDURE — 97110 THERAPEUTIC EXERCISES: CPT | Mod: GP

## 2021-01-20 NOTE — PROGRESS NOTES

## 2021-01-21 ENCOUNTER — OFFICE VISIT (OUTPATIENT)
Dept: CHIROPRACTIC MEDICINE | Facility: OTHER | Age: 31
End: 2021-01-21
Attending: CHIROPRACTOR
Payer: COMMERCIAL

## 2021-01-21 ENCOUNTER — TELEPHONE (OUTPATIENT)
Dept: FAMILY MEDICINE | Facility: OTHER | Age: 31
End: 2021-01-21

## 2021-01-21 DIAGNOSIS — M99.02 SEGMENTAL AND SOMATIC DYSFUNCTION OF THORACIC REGION: ICD-10-CM

## 2021-01-21 DIAGNOSIS — M99.03 SEGMENTAL AND SOMATIC DYSFUNCTION OF LUMBAR REGION: ICD-10-CM

## 2021-01-21 DIAGNOSIS — M99.01 SEGMENTAL AND SOMATIC DYSFUNCTION OF CERVICAL REGION: Primary | ICD-10-CM

## 2021-01-21 DIAGNOSIS — M54.2 CERVICALGIA: ICD-10-CM

## 2021-01-21 PROCEDURE — 98941 CHIROPRACT MANJ 3-4 REGIONS: CPT | Mod: AT | Performed by: CHIROPRACTOR

## 2021-01-21 NOTE — TELEPHONE ENCOUNTER
Please call patient back regarding vertigo issues she has been having .  She would like a referral to a vertigo specialist.  States she is not having symptoms at this time but states it is a chronic issue and she has had some episodes this week. Please get back today if possible.    [phone]

## 2021-01-22 NOTE — PROGRESS NOTES
Subjective Finding:    Chief compalint: Patient presents with:  Back Pain  Neck Pain: vertigo.  seeing PT as well  , Pain Scale: 7/10, Intensity: sharp, Duration: 1 weeks, Radiating: no.    Date of injury:     Activities that the pain restricts:   Home/household/hobbies/social activities: yes.  Work duties: yes.  Sleep: yes.  Makes symptoms better: rest.  Makes symptoms worse: activity.  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 Yes: MD.  Work related: no.  Automobile related injury: no.    Objective and Assessment:    Posture Analysis:   High shoulder: .  Head tilt: .  High iliac crest: .  Head carriage: forward.  Thoracic Kyphosis: neutral.  Lumbar Lordosis: forward.    Lumbar Range of Motion: extension decreased.  Cervical Range of Motion: extension decreased.  Thoracic Range of Motion: extension decreased.  Extremity Range of Motion: .    Palpation:   Quad lumb: bilateral, referred pain: no  Lev scapulae: dull pain, referred pain: no    Segmental dysfunction pre-treatment and treatment area: C3, C4, T5, L4 and L5.    Assessment post-treatment:  Cervical: ROM increased.  Thoracic: ROM increased.  Lumbar: ROM increased.    Comments: .  Post pregnancy    Complicating Factors: .    Procedure(s):  CMT:  54608 Chiropractic manipulative treatment 3-4 regions performed   Cervical: Diversified, See above for level, Supine, Thoracic: Diversified, See above for level, Prone and Lumbar: Diversified, See above for level, Side posture    Modalities:  None performed this visit    Therapeutic procedures:  None    Plan:  Treatment plan: PRN.  Instructed patient: stretch as instructed at visit.  Short term goals: reduce pain.  Long term goals: restore normal function.  Prognosis: excellent.

## 2021-01-25 ENCOUNTER — OFFICE VISIT (OUTPATIENT)
Dept: CHIROPRACTIC MEDICINE | Facility: OTHER | Age: 31
End: 2021-01-25
Attending: CHIROPRACTOR
Payer: COMMERCIAL

## 2021-01-25 ENCOUNTER — OFFICE VISIT (OUTPATIENT)
Dept: FAMILY MEDICINE | Facility: OTHER | Age: 31
End: 2021-01-25
Attending: FAMILY MEDICINE
Payer: COMMERCIAL

## 2021-01-25 ENCOUNTER — HOSPITAL ENCOUNTER (OUTPATIENT)
Dept: PHYSICAL THERAPY | Facility: HOSPITAL | Age: 31
Setting detail: THERAPIES SERIES
End: 2021-01-25
Attending: CHIROPRACTOR
Payer: COMMERCIAL

## 2021-01-25 VITALS
HEART RATE: 109 BPM | SYSTOLIC BLOOD PRESSURE: 90 MMHG | HEIGHT: 67 IN | DIASTOLIC BLOOD PRESSURE: 56 MMHG | OXYGEN SATURATION: 100 % | RESPIRATION RATE: 16 BRPM | BODY MASS INDEX: 21.35 KG/M2 | TEMPERATURE: 96.8 F | WEIGHT: 136 LBS

## 2021-01-25 DIAGNOSIS — R53.83 FATIGUE, UNSPECIFIED TYPE: ICD-10-CM

## 2021-01-25 DIAGNOSIS — M54.50 ACUTE BILATERAL LOW BACK PAIN WITHOUT SCIATICA: ICD-10-CM

## 2021-01-25 DIAGNOSIS — M99.02 SEGMENTAL AND SOMATIC DYSFUNCTION OF THORACIC REGION: ICD-10-CM

## 2021-01-25 DIAGNOSIS — R42 VERTIGO: ICD-10-CM

## 2021-01-25 DIAGNOSIS — R51.9 CHRONIC NONINTRACTABLE HEADACHE, UNSPECIFIED HEADACHE TYPE: ICD-10-CM

## 2021-01-25 DIAGNOSIS — M99.03 SEGMENTAL AND SOMATIC DYSFUNCTION OF LUMBAR REGION: Primary | ICD-10-CM

## 2021-01-25 DIAGNOSIS — L65.9 HAIR LOSS: Primary | ICD-10-CM

## 2021-01-25 DIAGNOSIS — E55.9 HYPOVITAMINOSIS D: ICD-10-CM

## 2021-01-25 DIAGNOSIS — M99.01 SEGMENTAL AND SOMATIC DYSFUNCTION OF CERVICAL REGION: ICD-10-CM

## 2021-01-25 DIAGNOSIS — C43.71 MALIGNANT MELANOMA OF SKIN OF RIGHT LEG (H): ICD-10-CM

## 2021-01-25 DIAGNOSIS — R20.2 PARESTHESIA: ICD-10-CM

## 2021-01-25 DIAGNOSIS — G89.29 CHRONIC NONINTRACTABLE HEADACHE, UNSPECIFIED HEADACHE TYPE: ICD-10-CM

## 2021-01-25 LAB
ALBUMIN SERPL-MCNC: 4.3 G/DL (ref 3.4–5)
ALP SERPL-CCNC: 54 U/L (ref 40–150)
ALT SERPL W P-5'-P-CCNC: 19 U/L (ref 0–50)
ANION GAP SERPL CALCULATED.3IONS-SCNC: 5 MMOL/L (ref 3–14)
AST SERPL W P-5'-P-CCNC: 13 U/L (ref 0–45)
BASOPHILS # BLD AUTO: 0 10E9/L (ref 0–0.2)
BASOPHILS NFR BLD AUTO: 0.3 %
BILIRUB SERPL-MCNC: 0.5 MG/DL (ref 0.2–1.3)
BUN SERPL-MCNC: 16 MG/DL (ref 7–30)
CALCIUM SERPL-MCNC: 8.6 MG/DL (ref 8.5–10.1)
CHLORIDE SERPL-SCNC: 106 MMOL/L (ref 94–109)
CO2 SERPL-SCNC: 28 MMOL/L (ref 20–32)
CREAT SERPL-MCNC: 0.82 MG/DL (ref 0.52–1.04)
CRP SERPL-MCNC: <2.9 MG/L (ref 0–8)
DIFFERENTIAL METHOD BLD: NORMAL
EOSINOPHIL # BLD AUTO: 0 10E9/L (ref 0–0.7)
EOSINOPHIL NFR BLD AUTO: 0.3 %
ERYTHROCYTE [DISTWIDTH] IN BLOOD BY AUTOMATED COUNT: 11.7 % (ref 10–15)
ERYTHROCYTE [SEDIMENTATION RATE] IN BLOOD BY WESTERGREN METHOD: 5 MM/H (ref 0–20)
FERRITIN SERPL-MCNC: 50 NG/ML (ref 12–150)
GFR SERPL CREATININE-BSD FRML MDRD: >90 ML/MIN/{1.73_M2}
GLUCOSE SERPL-MCNC: 90 MG/DL (ref 70–99)
HCT VFR BLD AUTO: 42.2 % (ref 35–47)
HGB BLD-MCNC: 14.4 G/DL (ref 11.7–15.7)
IMM GRANULOCYTES # BLD: 0 10E9/L (ref 0–0.4)
IMM GRANULOCYTES NFR BLD: 0.3 %
IRON SATN MFR SERPL: 21 % (ref 15–46)
IRON SERPL-MCNC: 58 UG/DL (ref 35–180)
LYMPHOCYTES # BLD AUTO: 2.2 10E9/L (ref 0.8–5.3)
LYMPHOCYTES NFR BLD AUTO: 24.1 %
MCH RBC QN AUTO: 30.8 PG (ref 26.5–33)
MCHC RBC AUTO-ENTMCNC: 34.1 G/DL (ref 31.5–36.5)
MCV RBC AUTO: 90 FL (ref 78–100)
MONOCYTES # BLD AUTO: 0.4 10E9/L (ref 0–1.3)
MONOCYTES NFR BLD AUTO: 4.9 %
NEUTROPHILS # BLD AUTO: 6.3 10E9/L (ref 1.6–8.3)
NEUTROPHILS NFR BLD AUTO: 70.1 %
NRBC # BLD AUTO: 0 10*3/UL
NRBC BLD AUTO-RTO: 0 /100
PLATELET # BLD AUTO: 256 10E9/L (ref 150–450)
POTASSIUM SERPL-SCNC: 4 MMOL/L (ref 3.4–5.3)
PROT SERPL-MCNC: 7.8 G/DL (ref 6.8–8.8)
RBC # BLD AUTO: 4.67 10E12/L (ref 3.8–5.2)
SODIUM SERPL-SCNC: 139 MMOL/L (ref 133–144)
TIBC SERPL-MCNC: 272 UG/DL (ref 240–430)
TSH SERPL DL<=0.005 MIU/L-ACNC: 1.49 MU/L (ref 0.4–4)
WBC # BLD AUTO: 9 10E9/L (ref 4–11)

## 2021-01-25 PROCEDURE — 82306 VITAMIN D 25 HYDROXY: CPT | Performed by: FAMILY MEDICINE

## 2021-01-25 PROCEDURE — 83550 IRON BINDING TEST: CPT | Performed by: FAMILY MEDICINE

## 2021-01-25 PROCEDURE — 99215 OFFICE O/P EST HI 40 MIN: CPT | Performed by: FAMILY MEDICINE

## 2021-01-25 PROCEDURE — 98941 CHIROPRACT MANJ 3-4 REGIONS: CPT | Mod: AT | Performed by: CHIROPRACTOR

## 2021-01-25 PROCEDURE — 85652 RBC SED RATE AUTOMATED: CPT | Performed by: FAMILY MEDICINE

## 2021-01-25 PROCEDURE — 97110 THERAPEUTIC EXERCISES: CPT | Mod: GP

## 2021-01-25 PROCEDURE — 82728 ASSAY OF FERRITIN: CPT | Performed by: FAMILY MEDICINE

## 2021-01-25 PROCEDURE — 80050 GENERAL HEALTH PANEL: CPT | Performed by: FAMILY MEDICINE

## 2021-01-25 PROCEDURE — 36415 COLL VENOUS BLD VENIPUNCTURE: CPT | Performed by: FAMILY MEDICINE

## 2021-01-25 PROCEDURE — 83540 ASSAY OF IRON: CPT | Performed by: FAMILY MEDICINE

## 2021-01-25 PROCEDURE — 82607 VITAMIN B-12: CPT | Performed by: FAMILY MEDICINE

## 2021-01-25 PROCEDURE — 86140 C-REACTIVE PROTEIN: CPT | Performed by: FAMILY MEDICINE

## 2021-01-25 RX ORDER — CALCIUM/MAGNESIUM/ZINC 333-133 MG
1 TABLET ORAL DAILY
COMMUNITY
End: 2021-03-10

## 2021-01-25 RX ORDER — MULTIVIT-MIN/IRON/FOLIC ACID/K 18-600-40
1 CAPSULE ORAL DAILY
COMMUNITY
End: 2021-03-10

## 2021-01-25 RX ORDER — FENUGREEK SEED 610 MG
1 CAPSULE ORAL DAILY
COMMUNITY
End: 2021-03-10

## 2021-01-25 SDOH — ECONOMIC STABILITY: FOOD INSECURITY: WITHIN THE PAST 12 MONTHS, YOU WORRIED THAT YOUR FOOD WOULD RUN OUT BEFORE YOU GOT MONEY TO BUY MORE.: NOT ASKED

## 2021-01-25 SDOH — ECONOMIC STABILITY: INCOME INSECURITY: HOW HARD IS IT FOR YOU TO PAY FOR THE VERY BASICS LIKE FOOD, HOUSING, MEDICAL CARE, AND HEATING?: NOT ASKED

## 2021-01-25 SDOH — ECONOMIC STABILITY: TRANSPORTATION INSECURITY
IN THE PAST 12 MONTHS, HAS THE LACK OF TRANSPORTATION KEPT YOU FROM MEDICAL APPOINTMENTS OR FROM GETTING MEDICATIONS?: NOT ASKED

## 2021-01-25 SDOH — ECONOMIC STABILITY: TRANSPORTATION INSECURITY
IN THE PAST 12 MONTHS, HAS LACK OF TRANSPORTATION KEPT YOU FROM MEETINGS, WORK, OR FROM GETTING THINGS NEEDED FOR DAILY LIVING?: NOT ASKED

## 2021-01-25 SDOH — ECONOMIC STABILITY: FOOD INSECURITY: WITHIN THE PAST 12 MONTHS, THE FOOD YOU BOUGHT JUST DIDN'T LAST AND YOU DIDN'T HAVE MONEY TO GET MORE.: NOT ASKED

## 2021-01-25 ASSESSMENT — PAIN SCALES - GENERAL: PAINLEVEL: NO PAIN (0)

## 2021-01-25 ASSESSMENT — MIFFLIN-ST. JEOR: SCORE: 1359.52

## 2021-01-25 NOTE — PROGRESS NOTES

## 2021-01-25 NOTE — PATIENT INSTRUCTIONS
1.  Stop fenugreek, zinc, skin, hyaluronic acid, NAC and dandelion supplements  2.  Start optivite PMT -- (take instead of prenatal)  3 tablets 2x/day  3.  Continue milk thistle and vitamin D and apple cider vinegar  4.  Drink 60 oz water daily  5.  Sleep!

## 2021-01-25 NOTE — NURSING NOTE
"Chief Complaint   Patient presents with     Dizziness       Initial BP 90/56 (BP Location: Left arm, Patient Position: Sitting, Cuff Size: Adult Regular)   Pulse 109   Temp 96.8  F (36  C) (Tympanic)   Resp 16   Ht 1.702 m (5' 7\")   Wt 61.7 kg (136 lb)   SpO2 100%   BMI 21.30 kg/m   Estimated body mass index is 21.3 kg/m  as calculated from the following:    Height as of this encounter: 1.702 m (5' 7\").    Weight as of this encounter: 61.7 kg (136 lb).  Medication Reconciliation: phuong Torres  "

## 2021-01-25 NOTE — PROGRESS NOTES
Assessment & Plan     Hair loss  Falling out in clumps in the shower  - Iron and iron binding capacity  - Ferritin  - TSH with free T4 reflex    Fatigue, unspecified type  More so the last month since getting covid in which she had bilateral lung numbness  - CBC with platelets differential  - Iron and iron binding capacity  - Ferritin  - TSH with free T4 reflex  - CRP, inflammation  - ESR: Erythrocyte sedimentation rate  - Comprehensive metabolic panel    Paresthesia  Legs as well  - Vitamin B12    Hypovitaminosis D  Has been taking 4000 international unit(s) daily  - Vitamin D Deficiency    Vertigo  Dad has this, sounded like nystagmus on Tuesday  Mri brain if labs are all normal  - CRP, inflammation  - ESR: Erythrocyte sedimentation rate  - Comprehensive metabolic panel    Malignant melanoma of skin of right leg (H)  Stable    Headaches -- ongoing for years with no work up  Will do mri brain if labs normal      40 minutes spent on the date of the encounter doing chart review, history and exam, documentation and further activities as noted above         Patient was agreeable to this plan and had no further questions.  Patient Instructions   1.  Stop fenugreek, zinc, skin, hyaluronic acid, NAC and dandelion supplements  2.  Start optivite PMT -- (take instead of prenatal)  3 tablets 2x/day  3.  Continue milk thistle and vitamin D and apple cider vinegar  4.  Drink 60 oz water daily  5.  Sleep!      Return in about 2 weeks (around 2/8/2021) for Follow up, with me.    Debby Floyd MD  [facility] - PATRICKYUSUF Vaughn is a 30 year old who presents to clinic today for the following health issues   HPI       Concern - Vertigo   Onset: September, 2020   Description: Dizziness, headaches, fatigue, nausea, uncontrolled eye movements (3x all this last Tuesday)   Intensity: moderate  Progression of Symptoms:  worsening and intermittent  Accompanying Signs & Symptoms: easily bruising  Previous  "history of similar problem: no - father has vertigo   Precipitating factors:        Worsened by: n/a  Alleviating factors:        Improved by: n/a  Therapies tried and outcome: resting    Migraine vs tension headaches    Since your last clinic visit, how have your headaches changed?  No change    How often are you getting headaches or migraines? daily     Are you able to do normal daily activities when you have a migraine? sometimes    Are you taking rescue/relief medications? (Select all that apply) Tylenol    How helpful is your rescue/relief medication?  I get some relief    Are you taking any medications to prevent migraines? (Select all that apply)  No    In the past 4 weeks, how often have you gone to urgent care or the emergency room because of your headaches?  0      How many servings of fruits and vegetables do you eat daily?  2-3    On average, how many sweetened beverages do you drink each day (Examples: soda, juice, sweet tea, etc.  Do NOT count diet or artificially sweetened beverages)?   0    How many days per week do you exercise enough to make your heart beat faster? 4    How many minutes a day do you exercise enough to make your heart beat faster? 10 - 19  How many days per week do you miss taking your medication? 2    What makes it hard for you to take your medications?  remembering to take      Review of Systems   Constitutional, HEENT, cardiovascular, pulmonary, GI, , musculoskeletal, neuro, skin, endocrine and psych systems are negative, except as otherwise noted.      Objective    BP 90/56 (BP Location: Left arm, Patient Position: Sitting, Cuff Size: Adult Regular)   Pulse 109   Temp 96.8  F (36  C) (Tympanic)   Resp 16   Ht 1.702 m (5' 7\")   Wt 61.7 kg (136 lb)   SpO2 100%   BMI 21.30 kg/m    Body mass index is 21.3 kg/m .  Physical Exam   GENERAL: healthy, alert and no distress  EYES: Eyes grossly normal to inspection, PERRL and conjunctivae and sclerae normal  HENT: ear canals and " TM's normal, nose and mouth without ulcers or lesions  NECK: no adenopathy, no asymmetry, masses, or scars and thyroid normal to palpation  RESP: lungs clear to auscultation - no rales, rhonchi or wheezes  CV: regular rate and rhythm, normal S1 S2, no S3 or S4, no murmur, click or rub, no peripheral edema and peripheral pulses strong  ABDOMEN: soft, nontender, no hepatosplenomegaly, no masses and bowel sounds normal  MS: no gross musculoskeletal defects noted, no edema  NEURO: Normal strength and tone, mentation intact and speech normal  NEURO: marty-hallpike wnl  PSYCH: mentation appears normal, affect normal/bright    No results found for any visits on 01/25/21.

## 2021-01-26 LAB — VIT B12 SERPL-MCNC: 672 PG/ML (ref 193–986)

## 2021-01-27 LAB — DEPRECATED CALCIDIOL+CALCIFEROL SERPL-MC: 35 UG/L (ref 20–75)

## 2021-02-03 ENCOUNTER — HOSPITAL ENCOUNTER (OUTPATIENT)
Dept: PHYSICAL THERAPY | Facility: HOSPITAL | Age: 31
Setting detail: THERAPIES SERIES
End: 2021-02-03
Attending: CHIROPRACTOR
Payer: COMMERCIAL

## 2021-02-03 ENCOUNTER — OFFICE VISIT (OUTPATIENT)
Dept: CHIROPRACTIC MEDICINE | Facility: OTHER | Age: 31
End: 2021-02-03
Attending: CHIROPRACTOR
Payer: COMMERCIAL

## 2021-02-03 DIAGNOSIS — M99.01 SEGMENTAL AND SOMATIC DYSFUNCTION OF CERVICAL REGION: ICD-10-CM

## 2021-02-03 DIAGNOSIS — M99.02 SEGMENTAL AND SOMATIC DYSFUNCTION OF THORACIC REGION: ICD-10-CM

## 2021-02-03 DIAGNOSIS — M99.03 SEGMENTAL AND SOMATIC DYSFUNCTION OF LUMBAR REGION: Primary | ICD-10-CM

## 2021-02-03 DIAGNOSIS — M54.50 ACUTE BILATERAL LOW BACK PAIN WITHOUT SCIATICA: ICD-10-CM

## 2021-02-03 PROCEDURE — 97140 MANUAL THERAPY 1/> REGIONS: CPT | Mod: GP

## 2021-02-03 PROCEDURE — 98941 CHIROPRACT MANJ 3-4 REGIONS: CPT | Mod: AT | Performed by: CHIROPRACTOR

## 2021-02-04 NOTE — PROGRESS NOTES

## 2021-02-08 ENCOUNTER — OFFICE VISIT (OUTPATIENT)
Dept: CHIROPRACTIC MEDICINE | Facility: OTHER | Age: 31
End: 2021-02-08
Attending: CHIROPRACTOR
Payer: COMMERCIAL

## 2021-02-08 ENCOUNTER — HOSPITAL ENCOUNTER (OUTPATIENT)
Dept: PHYSICAL THERAPY | Facility: HOSPITAL | Age: 31
Setting detail: THERAPIES SERIES
End: 2021-02-08
Attending: CHIROPRACTOR
Payer: COMMERCIAL

## 2021-02-08 DIAGNOSIS — M54.50 ACUTE BILATERAL LOW BACK PAIN WITHOUT SCIATICA: ICD-10-CM

## 2021-02-08 DIAGNOSIS — M99.03 SEGMENTAL AND SOMATIC DYSFUNCTION OF LUMBAR REGION: Primary | ICD-10-CM

## 2021-02-08 DIAGNOSIS — M99.01 SEGMENTAL AND SOMATIC DYSFUNCTION OF CERVICAL REGION: ICD-10-CM

## 2021-02-08 DIAGNOSIS — M99.02 SEGMENTAL AND SOMATIC DYSFUNCTION OF THORACIC REGION: ICD-10-CM

## 2021-02-08 PROCEDURE — 97140 MANUAL THERAPY 1/> REGIONS: CPT | Mod: GP

## 2021-02-08 PROCEDURE — 98941 CHIROPRACT MANJ 3-4 REGIONS: CPT | Mod: AT | Performed by: CHIROPRACTOR

## 2021-02-08 PROCEDURE — 97110 THERAPEUTIC EXERCISES: CPT | Mod: GP

## 2021-02-09 NOTE — PROGRESS NOTES
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    Patient Instructions   1.  Amitriptyline 10 mg --- take at bedtime and helps to reduce headaches    2.  n acetyl cysteine 500 or 600mg - take 1 tablet 2x/day for 3 months    3.  Beach vacation :)      No follow-ups on file.    Debby Floyd MD  [facility] - DOUGLAS Vaughn is a 30 year old who presents for the following health issues   HPI       Concern - f/u vertigo   Onset: Sept 2020  Description: Dizziness, headaches, fatigue, nausea, uncontrolled eye movements  Intensity: moderate  Progression of Symptoms:  improving and intermittent  Accompanying Signs & Symptoms: Daily headaches, fatigue,   Previous history of similar problem: Yes  Precipitating factors:        Worsened by: NA  Alleviating factors:        Improved by: Lying down resting  Therapies tried and outcome: PT added optivite    Headache    Since your last clinic visit, how have your headaches changed?  Worsened    How often are you getting headaches or migraines? daily     Are you able to do normal daily activities when you have a migraine? Yes    Are you taking rescue/relief medications? (Select all that apply) No    How helpful is your rescue/relief medication?  The relief is inconsistent    Are you taking any medications to prevent migraines? (Select all that apply)  No    In  "the past 4 weeks, how often have you gone to urgent care or the emergency room because of your headaches?  0      How many servings of fruits and vegetables do you eat daily?  2-3    On average, how many sweetened beverages do you drink each day (Examples: soda, juice, sweet tea, etc.  Do NOT count diet or artificially sweetened beverages)?   0    How many days per week do you exercise enough to make your heart beat faster? 3 or less    How many minutes a day do you exercise enough to make your heart beat faster? 20 - 29    How many days per week do you miss taking your medication? 0      Review of Systems   Constitutional, HEENT, cardiovascular, pulmonary, gi and gu systems are negative, except as otherwise noted.      Objective    BP 98/66 (BP Location: Left arm, Patient Position: Sitting, Cuff Size: Adult Regular)   Pulse 70   Temp 98  F (36.7  C) (Tympanic)   Ht 1.702 m (5' 7\")   Wt 61.2 kg (135 lb)   SpO2 99%   BMI 21.14 kg/m    There is no height or weight on file to calculate BMI.  Physical Exam   GENERAL: alert, no distress and fatigued  NECK: no adenopathy, no asymmetry, masses, or scars and thyroid normal to palpation  RESP: lungs clear to auscultation - no rales, rhonchi or wheezes  CV: regular rate and rhythm, normal S1 S2, no S3 or S4, no murmur, click or rub, no peripheral edema and peripheral pulses strong  ABDOMEN: soft, nontender, no hepatosplenomegaly, no masses and bowel sounds normal  MS: no gross musculoskeletal defects noted, no edema  PSYCH: mentation appears normal, affect normal/bright    Results for orders placed or performed in visit on 02/10/21   XR CERVICAL SPINE 2/3 VWS (Clinic Performed)     Status: None    Narrative    PROCEDURE: XR CERVICAL SPINE 2/3 VWS 2/10/2021 3:27 PM    HISTORY: Chronic intractable headache, unspecified headache type;  Chronic intractable headache, unspecified headache type; Cervicalgia    COMPARISONS: None.    TECHNIQUE: AP and lateral    FINDINGS: The " cervical disks are normal in height. The vertebral  bodies and arches are intact. The prevertebral soft tissues are  normal.         Impression    IMPRESSION: Normal cervical spine    MEHDI LEE MD

## 2021-02-10 ENCOUNTER — ANCILLARY PROCEDURE (OUTPATIENT)
Dept: GENERAL RADIOLOGY | Facility: OTHER | Age: 31
End: 2021-02-10
Attending: FAMILY MEDICINE
Payer: COMMERCIAL

## 2021-02-10 ENCOUNTER — OFFICE VISIT (OUTPATIENT)
Dept: FAMILY MEDICINE | Facility: OTHER | Age: 31
End: 2021-02-10
Attending: FAMILY MEDICINE
Payer: COMMERCIAL

## 2021-02-10 VITALS
HEIGHT: 67 IN | BODY MASS INDEX: 21.19 KG/M2 | SYSTOLIC BLOOD PRESSURE: 98 MMHG | OXYGEN SATURATION: 99 % | DIASTOLIC BLOOD PRESSURE: 66 MMHG | WEIGHT: 135 LBS | TEMPERATURE: 98 F | HEART RATE: 70 BPM

## 2021-02-10 DIAGNOSIS — R51.9 CHRONIC INTRACTABLE HEADACHE, UNSPECIFIED HEADACHE TYPE: Primary | ICD-10-CM

## 2021-02-10 DIAGNOSIS — M54.2 CERVICALGIA: ICD-10-CM

## 2021-02-10 DIAGNOSIS — R51.9 CHRONIC INTRACTABLE HEADACHE, UNSPECIFIED HEADACHE TYPE: ICD-10-CM

## 2021-02-10 DIAGNOSIS — G89.29 CHRONIC INTRACTABLE HEADACHE, UNSPECIFIED HEADACHE TYPE: Primary | ICD-10-CM

## 2021-02-10 DIAGNOSIS — G89.29 CHRONIC INTRACTABLE HEADACHE, UNSPECIFIED HEADACHE TYPE: ICD-10-CM

## 2021-02-10 DIAGNOSIS — U07.1 2019 NOVEL CORONAVIRUS DISEASE (COVID-19): ICD-10-CM

## 2021-02-10 PROCEDURE — 99214 OFFICE O/P EST MOD 30 MIN: CPT | Performed by: FAMILY MEDICINE

## 2021-02-10 PROCEDURE — 72040 X-RAY EXAM NECK SPINE 2-3 VW: CPT | Mod: TC | Performed by: RADIOLOGY

## 2021-02-10 ASSESSMENT — MIFFLIN-ST. JEOR: SCORE: 1354.99

## 2021-02-10 ASSESSMENT — PAIN SCALES - GENERAL: PAINLEVEL: NO PAIN (0)

## 2021-02-10 NOTE — NURSING NOTE
"Chief Complaint   Patient presents with     Dizziness       Initial BP 98/66 (BP Location: Left arm, Patient Position: Sitting, Cuff Size: Adult Regular)   Pulse 70   Temp 98  F (36.7  C) (Tympanic)   Ht 1.702 m (5' 7\")   Wt 61.2 kg (135 lb)   SpO2 99%   BMI 21.14 kg/m   Estimated body mass index is 21.14 kg/m  as calculated from the following:    Height as of this encounter: 1.702 m (5' 7\").    Weight as of this encounter: 61.2 kg (135 lb).  Medication Reconciliation: complete  Jenn oRberts LPN  "

## 2021-02-10 NOTE — PATIENT INSTRUCTIONS
1.  Amitriptyline 10 mg --- take at bedtime and helps to reduce headaches    2.  n acetyl cysteine 500 or 600mg - take 1 tablet 2x/day for 3 months    3.  Beach vacation :)

## 2021-02-11 ENCOUNTER — HOSPITAL ENCOUNTER (OUTPATIENT)
Dept: MRI IMAGING | Facility: HOSPITAL | Age: 31
Discharge: HOME | End: 2021-02-11
Attending: FAMILY MEDICINE | Admitting: FAMILY MEDICINE
Payer: COMMERCIAL

## 2021-02-11 DIAGNOSIS — R51.9 CHRONIC NONINTRACTABLE HEADACHE, UNSPECIFIED HEADACHE TYPE: ICD-10-CM

## 2021-02-11 DIAGNOSIS — R20.2 PARESTHESIA: ICD-10-CM

## 2021-02-11 DIAGNOSIS — R42 VERTIGO: ICD-10-CM

## 2021-02-11 DIAGNOSIS — G89.29 CHRONIC NONINTRACTABLE HEADACHE, UNSPECIFIED HEADACHE TYPE: ICD-10-CM

## 2021-02-11 DIAGNOSIS — C43.71 MALIGNANT MELANOMA OF SKIN OF RIGHT LEG (H): ICD-10-CM

## 2021-02-11 DIAGNOSIS — R53.83 FATIGUE, UNSPECIFIED TYPE: ICD-10-CM

## 2021-02-11 PROCEDURE — A9585 GADOBUTROL INJECTION: HCPCS | Performed by: RADIOLOGY

## 2021-02-11 PROCEDURE — 70553 MRI BRAIN STEM W/O & W/DYE: CPT

## 2021-02-11 PROCEDURE — 255N000002 HC RX 255 OP 636: Performed by: RADIOLOGY

## 2021-02-11 RX ORDER — GADOBUTROL 604.72 MG/ML
2 INJECTION INTRAVENOUS ONCE
Status: COMPLETED | OUTPATIENT
Start: 2021-02-11 | End: 2021-02-11

## 2021-02-11 RX ADMIN — GADOBUTROL 2 ML: 604.72 INJECTION INTRAVENOUS at 16:33

## 2021-02-11 RX ADMIN — GADOBUTROL 2 ML: 604.72 INJECTION INTRAVENOUS at 16:34

## 2021-02-15 ENCOUNTER — OFFICE VISIT (OUTPATIENT)
Dept: CHIROPRACTIC MEDICINE | Facility: OTHER | Age: 31
End: 2021-02-15
Attending: CHIROPRACTOR
Payer: COMMERCIAL

## 2021-02-15 ENCOUNTER — HOSPITAL ENCOUNTER (OUTPATIENT)
Dept: PHYSICAL THERAPY | Facility: HOSPITAL | Age: 31
Setting detail: THERAPIES SERIES
End: 2021-02-15
Attending: CHIROPRACTOR
Payer: COMMERCIAL

## 2021-02-15 DIAGNOSIS — M54.50 ACUTE BILATERAL LOW BACK PAIN WITHOUT SCIATICA: ICD-10-CM

## 2021-02-15 DIAGNOSIS — M99.01 SEGMENTAL AND SOMATIC DYSFUNCTION OF CERVICAL REGION: ICD-10-CM

## 2021-02-15 DIAGNOSIS — M99.03 SEGMENTAL AND SOMATIC DYSFUNCTION OF LUMBAR REGION: Primary | ICD-10-CM

## 2021-02-15 DIAGNOSIS — M99.02 SEGMENTAL AND SOMATIC DYSFUNCTION OF THORACIC REGION: ICD-10-CM

## 2021-02-15 PROCEDURE — 98941 CHIROPRACT MANJ 3-4 REGIONS: CPT | Mod: AT | Performed by: CHIROPRACTOR

## 2021-02-15 PROCEDURE — 97110 THERAPEUTIC EXERCISES: CPT | Mod: GP

## 2021-02-15 NOTE — PROGRESS NOTES
Subjective Finding:    Chief compalint: Patient presents with:  Back Pain: getting good improvement.  ADL are increasing.  better ROm  Neck Pain  , Pain Scale: 7/10, Intensity: sharp, Duration: 1 weeks, Radiating: no.    Date of injury:     Activities that the pain restricts:   Home/household/hobbies/social activities: yes.  Work duties: yes.  Sleep: yes.  Makes symptoms better: rest.  Makes symptoms worse: activity.  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 Yes: MD.  Work related: no.  Automobile related injury: no.    Objective and Assessment:    Posture Analysis:   High shoulder: .  Head tilt: .  High iliac crest: .  Head carriage: forward.  Thoracic Kyphosis: neutral.  Lumbar Lordosis: forward.    Lumbar Range of Motion: extension decreased.  Cervical Range of Motion: extension decreased.  Thoracic Range of Motion: extension decreased.  Extremity Range of Motion: .    Palpation:   Quad lumb: bilateral, referred pain: no  Lev scapulae: dull pain, referred pain: no    Segmental dysfunction pre-treatment and treatment area: C3, C4, T5, L4 and L5.    Assessment post-treatment:  Cervical: ROM increased.  Thoracic: ROM increased.  Lumbar: ROM increased.    Comments: .  Post pregnancy    Complicating Factors: .    Procedure(s):  CMT:  05295 Chiropractic manipulative treatment 3-4 regions performed   Cervical: Diversified, See above for level, Supine, Thoracic: Diversified, See above for level, Prone and Lumbar: Diversified, See above for level, Side posture    Modalities:  None performed this visit    Therapeutic procedures:  None    Plan:  Treatment plan: PRN.  Instructed patient: stretch as instructed at visit.  Short term goals: reduce pain.  Long term goals: restore normal function.  Prognosis: excellent.

## 2021-02-15 NOTE — PROGRESS NOTES
Outpatient Physical Therapy Progress Note     Patient: Johana Duff  : 1990    Beginning/End Dates of Reporting Period:  21 to 2/15/2021    Referring Provider: Dr. Park (LBP), Dr. Floyd (cervical pain and HA)    Medical Diagnosis: Segmental and somatic dysfunction of lumbar region [M99.03] Dr. Park    Chronic intractable headache, unspecified headache type [R51.9, G89.29]  - Primary       Cervicalgia [M54.2]       Dr. Floyd     Client Self Report:  Patient's back pain is greatly improved and nearing full resolution. Patient had radicular pain with subactue presentation on evaluation on 2021. Since this time patient has had great response to MDT concepts and has entered recovery of function phase where she performs bending, lifting and twisting activities followed by extension stretches.     At this time she would like to include treatment for HA and cervical pain. Patient has long standing history of cervical pain and daily HA that originate at base of skull and move outward into parietal regions. Patient sees Dr. Park for chiropractic services and this has been helpful for management of HA and cervical pain, but has not resolved.     Patient would like to see if inclusion of stretches and exercises from PT would be helpful. Order from Dr. Floyd received.     Objective Measurements:  Cervical AROM screen performed. Cervical motion is WFL, but muscle tension appreciated at bilateral scalenes and UT with increased tone on R and also cervical paraspinal tone on R.     High tone present at suboccipital with R greater than L. Decreased segmental mobility appreciated at thoracic segments     Assessment: Will include treatment to cervical region and address HA with therapeutic exercise, HEP and manual techniques. Continue treatment for LB symptoms and recovery of function.     Goals:  LT20  LTG 1: Patient will be able to perform full range of lumbar mobility pain free  and perform bending, lifting, reaching and carrying tasks at home with good mechanics without limitation from pain.   GOAL PARTIALLY GRISELDA  LTG 2: Patient will be able to perform prolonged sitting, standing and walking without limitation from pain.   GOAL MET 2/15/21  LTG 3: Patient will be able to return to prior baseline without limitation from back pain.   GOAL PARTIALLY MET  LTG 4: Patient will be able to independently perform HEP and continue back health concepts to decrease risk of recurrence.   GOAL MET 2/15/21    LTG 5: Patient will report decreased frequency and intensity of HA and cervical pain allowing increased tolerance for household chores, daily childcare tasks and recreational activities.     LTG 6: Patient will have program strategies and exercises that are effective at minimizing or preventing onset of cervical pain and HA symptoms.     Plan:  Changes to therapy plan of care: continue treatment and PT management of back symptoms. Include treatment to cervical region and HA symptoms.     Discharge:  No  I certify the need for these services furnished under this plan of treatment and while under my care. (Physician co-signature of this document indicates review and certification of the therapy plan).

## 2021-02-16 NOTE — PROGRESS NOTES
Subjective Finding:    Chief compalint: Patient presents with:  Back Pain: getting better.  doing PT as well for the sx  Neck Pain  , Pain Scale: 7/10, Intensity: sharp, Duration: 1 weeks, Radiating: no.    Date of injury:     Activities that the pain restricts:   Home/household/hobbies/social activities: yes.  Work duties: yes.  Sleep: yes.  Makes symptoms better: rest.  Makes symptoms worse: activity.  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 Yes: MD.  Work related: no.  Automobile related injury: no.    Objective and Assessment:    Posture Analysis:   High shoulder: .  Head tilt: .  High iliac crest: .  Head carriage: forward.  Thoracic Kyphosis: neutral.  Lumbar Lordosis: forward.    Lumbar Range of Motion: extension decreased.  Cervical Range of Motion: extension decreased.  Thoracic Range of Motion: extension decreased.  Extremity Range of Motion: .    Palpation:   Quad lumb: bilateral, referred pain: no  Lev scapulae: dull pain, referred pain: no    Segmental dysfunction pre-treatment and treatment area: C3, C4, T5, L4 and L5.    Assessment post-treatment:  Cervical: ROM increased.  Thoracic: ROM increased.  Lumbar: ROM increased.    Comments: .  Post pregnancy    Complicating Factors: .    Procedure(s):  CMT:  97577 Chiropractic manipulative treatment 3-4 regions performed   Cervical: Diversified, See above for level, Supine, Thoracic: Diversified, See above for level, Prone and Lumbar: Diversified, See above for level, Side posture    Modalities:  None performed this visit    Therapeutic procedures:  None    Plan:  Treatment plan: PRN.  Instructed patient: stretch as instructed at visit.  Short term goals: reduce pain.  Long term goals: restore normal function.  Prognosis: excellent.

## 2021-02-22 ENCOUNTER — HOSPITAL ENCOUNTER (OUTPATIENT)
Dept: PHYSICAL THERAPY | Facility: HOSPITAL | Age: 31
Setting detail: THERAPIES SERIES
End: 2021-02-22
Attending: CHIROPRACTOR
Payer: COMMERCIAL

## 2021-02-22 ENCOUNTER — OFFICE VISIT (OUTPATIENT)
Dept: CHIROPRACTIC MEDICINE | Facility: OTHER | Age: 31
End: 2021-02-22
Attending: CHIROPRACTOR
Payer: COMMERCIAL

## 2021-02-22 DIAGNOSIS — M54.50 ACUTE BILATERAL LOW BACK PAIN WITHOUT SCIATICA: ICD-10-CM

## 2021-02-22 DIAGNOSIS — M99.02 SEGMENTAL AND SOMATIC DYSFUNCTION OF THORACIC REGION: ICD-10-CM

## 2021-02-22 DIAGNOSIS — M99.01 SEGMENTAL AND SOMATIC DYSFUNCTION OF CERVICAL REGION: ICD-10-CM

## 2021-02-22 DIAGNOSIS — M99.03 SEGMENTAL AND SOMATIC DYSFUNCTION OF LUMBAR REGION: Primary | ICD-10-CM

## 2021-02-22 PROCEDURE — 97110 THERAPEUTIC EXERCISES: CPT | Mod: GP

## 2021-02-22 PROCEDURE — 97140 MANUAL THERAPY 1/> REGIONS: CPT | Mod: GP

## 2021-02-22 PROCEDURE — 98941 CHIROPRACT MANJ 3-4 REGIONS: CPT | Mod: AT | Performed by: CHIROPRACTOR

## 2021-02-25 NOTE — PROGRESS NOTES

## 2021-03-01 ENCOUNTER — OFFICE VISIT (OUTPATIENT)
Dept: CHIROPRACTIC MEDICINE | Facility: OTHER | Age: 31
End: 2021-03-01
Attending: CHIROPRACTOR
Payer: COMMERCIAL

## 2021-03-01 DIAGNOSIS — M54.2 CERVICALGIA: ICD-10-CM

## 2021-03-01 DIAGNOSIS — M99.02 SEGMENTAL AND SOMATIC DYSFUNCTION OF THORACIC REGION: ICD-10-CM

## 2021-03-01 DIAGNOSIS — M99.03 SEGMENTAL AND SOMATIC DYSFUNCTION OF LUMBAR REGION: ICD-10-CM

## 2021-03-01 DIAGNOSIS — M99.01 SEGMENTAL AND SOMATIC DYSFUNCTION OF CERVICAL REGION: Primary | ICD-10-CM

## 2021-03-01 PROCEDURE — 98941 CHIROPRACT MANJ 3-4 REGIONS: CPT | Mod: AT | Performed by: CHIROPRACTOR

## 2021-03-03 NOTE — PROGRESS NOTES

## 2021-03-04 NOTE — PROGRESS NOTES
"    {PROVIDER CHARTING PREFERENCE:222588}    Subjective   Johana is a 30 year old who presents for the following health issues {ACCOMPANIED BY STATEMENT (Optional):348017}    HPI       Concern - Follow up - migraines  Onset: ***  Description: ***  Intensity: {.:796135}  Progression of Symptoms:  {.:720378}  Accompanying Signs & Symptoms: ***  Previous history of similar problem: ***  Precipitating factors:        Worsened by: ***  Alleviating factors:        Improved by: ***  Therapies tried and outcome: {NONE DEFAULTED:057875::\" none \"}    {additonal problems for provider to add (Optional):981790}    Review of Systems   {ROS COMP (Optional):039097}      Objective    There were no vitals taken for this visit.  There is no height or weight on file to calculate BMI.  Physical Exam   {Exam List (Optional):446798}    {Diagnostic Test Results (Optional):155260}    {AMBULATORY ATTESTATION (Optional):945124}        "

## 2021-03-08 ENCOUNTER — HOSPITAL ENCOUNTER (OUTPATIENT)
Dept: PHYSICAL THERAPY | Facility: HOSPITAL | Age: 31
Setting detail: THERAPIES SERIES
End: 2021-03-08
Attending: CHIROPRACTOR
Payer: COMMERCIAL

## 2021-03-08 PROCEDURE — 97140 MANUAL THERAPY 1/> REGIONS: CPT | Mod: GP

## 2021-03-10 ENCOUNTER — OFFICE VISIT (OUTPATIENT)
Dept: FAMILY MEDICINE | Facility: OTHER | Age: 31
End: 2021-03-10
Attending: FAMILY MEDICINE
Payer: COMMERCIAL

## 2021-03-10 ENCOUNTER — OFFICE VISIT (OUTPATIENT)
Dept: CHIROPRACTIC MEDICINE | Facility: OTHER | Age: 31
End: 2021-03-10
Attending: CHIROPRACTOR
Payer: COMMERCIAL

## 2021-03-10 ENCOUNTER — TELEPHONE (OUTPATIENT)
Dept: FAMILY MEDICINE | Facility: OTHER | Age: 31
End: 2021-03-10

## 2021-03-10 VITALS
TEMPERATURE: 98.4 F | SYSTOLIC BLOOD PRESSURE: 104 MMHG | HEIGHT: 67 IN | HEART RATE: 79 BPM | OXYGEN SATURATION: 98 % | WEIGHT: 137 LBS | BODY MASS INDEX: 21.5 KG/M2 | DIASTOLIC BLOOD PRESSURE: 62 MMHG

## 2021-03-10 DIAGNOSIS — M99.01 SEGMENTAL AND SOMATIC DYSFUNCTION OF CERVICAL REGION: ICD-10-CM

## 2021-03-10 DIAGNOSIS — N92.0 MENORRHAGIA WITH REGULAR CYCLE: ICD-10-CM

## 2021-03-10 DIAGNOSIS — R51.9 CHRONIC INTRACTABLE HEADACHE, UNSPECIFIED HEADACHE TYPE: Primary | ICD-10-CM

## 2021-03-10 DIAGNOSIS — M54.50 ACUTE BILATERAL LOW BACK PAIN WITHOUT SCIATICA: ICD-10-CM

## 2021-03-10 DIAGNOSIS — G89.29 CHRONIC INTRACTABLE HEADACHE, UNSPECIFIED HEADACHE TYPE: Primary | ICD-10-CM

## 2021-03-10 DIAGNOSIS — M99.03 SEGMENTAL AND SOMATIC DYSFUNCTION OF LUMBAR REGION: Primary | ICD-10-CM

## 2021-03-10 DIAGNOSIS — R53.83 FATIGUE, UNSPECIFIED TYPE: ICD-10-CM

## 2021-03-10 DIAGNOSIS — M99.02 SEGMENTAL AND SOMATIC DYSFUNCTION OF THORACIC REGION: ICD-10-CM

## 2021-03-10 DIAGNOSIS — N94.6 DYSMENORRHEA: ICD-10-CM

## 2021-03-10 PROCEDURE — 98941 CHIROPRACT MANJ 3-4 REGIONS: CPT | Mod: AT | Performed by: CHIROPRACTOR

## 2021-03-10 PROCEDURE — 99214 OFFICE O/P EST MOD 30 MIN: CPT | Performed by: FAMILY MEDICINE

## 2021-03-10 ASSESSMENT — MIFFLIN-ST. JEOR: SCORE: 1364.06

## 2021-03-10 ASSESSMENT — PAIN SCALES - GENERAL: PAINLEVEL: NO PAIN (0)

## 2021-03-10 NOTE — NURSING NOTE
"No chief complaint on file.      Initial /62   Pulse 79   Temp 98.4  F (36.9  C)   Ht 1.702 m (5' 7\")   Wt 62.1 kg (137 lb)   SpO2 98%   BMI 21.46 kg/m   Estimated body mass index is 21.46 kg/m  as calculated from the following:    Height as of this encounter: 1.702 m (5' 7\").    Weight as of this encounter: 62.1 kg (137 lb).  Medication Reconciliation: complete  Cristo Gandhi LPN    "

## 2021-03-10 NOTE — PROGRESS NOTES
Assessment & Plan     Chronic intractable headache, unspecified headache type  Improved with PT, continue that  She is doing great with self-care and boundaries  - acetylcysteine (N-ACETYL-L-CYSTEINE) 600 MG CAPS capsule; Take 1 capsule (600 mg) by mouth as needed    Fatigue, unspecified type  Improved with her taking more time for sleep, better nutrition and self care  Congratulated on her efforts and encouraged to continue    Menorrhagia with regular cycle  Improved while taking optivite pmt  Take for at least 6 months and then possibly wean to 2 tabs bid    Dysmenorrhea  Significantly improved this last cycle, no clots, no back pain, had some pelvic cramping but anticipate things will continue to improve          Patient was agreeable to this plan and had no further questions.    There are no Patient Instructions on file for this visit.    No follow-ups on file.    Debby Floyd MD  [facility] - DOUGLAS Vaughn is a 30 year old who presents for the following health issues     HPI       Migraine     Since your last clinic visit, how have your headaches changed?  Improved    How often are you getting headaches or migraines? 2-3 times a week     Are you able to do normal daily activities when you have a migraine? No    Are you taking rescue/relief medications? (Select all that apply) ibuprofen (Advil, Motrin)    How helpful is your rescue/relief medication?  I get only a small amount of relief    Are you taking any medications to prevent migraines? (Select all that apply)  No    In the past 4 weeks, how often have you gone to urgent care or the emergency room because of your headaches?  0      How many servings of fruits and vegetables do you eat daily?  2-3    On average, how many sweetened beverages do you drink each day (Examples: soda, juice, sweet tea, etc.  Do NOT count diet or artificially sweetened beverages)?   0    How many days per week do you exercise enough to make  "your heart beat faster? 3 or less    How many minutes a day do you exercise enough to make your heart beat faster? 20 - 29    How many days per week do you miss taking your medication? 0    Concern - PMS and dysmenorrhea  Onset: her whole life  Description: has had clots and moderate severe back pain with menses  Intensity: moderate  Progression of Symptoms:  improving  Accompanying Signs & Symptoms: some mild abdominal cramping  Previous history of similar problem: yes  Precipitating factors:        Worsened by: n/a  Alleviating factors:        Improved by: optivite vitamins  Therapies tried and outcome: above      Review of Systems   Constitutional, HEENT, cardiovascular, pulmonary, gi and gu systems are negative, except as otherwise noted.      Objective    /62   Pulse 79   Temp 98.4  F (36.9  C)   Ht 1.702 m (5' 7\")   Wt 62.1 kg (137 lb)   SpO2 98%   BMI 21.46 kg/m    Body mass index is 21.46 kg/m .  Physical Exam   GENERAL: healthy, alert and no distress  NECK: no adenopathy, no asymmetry, masses, or scars and thyroid normal to palpation  RESP: lungs clear to auscultation - no rales, rhonchi or wheezes  CV: regular rate and rhythm, normal S1 S2, no S3 or S4, no murmur, click or rub, no peripheral edema and peripheral pulses strong  ABDOMEN: soft, nontender, no hepatosplenomegaly, no masses and bowel sounds normal  MS: no gross musculoskeletal defects noted, no edema  PSYCH: mentation appears normal, affect normal/bright    No results found for any visits on 03/10/21.          "

## 2021-03-10 NOTE — TELEPHONE ENCOUNTER
Pt called stated she forgot to have PCP look at top of her head today, pt reports 3 bumps on scalp, size of pencil eraser or larger. No pain, does have hx of this. Present for years, but getting larger. Pt wanted to make PCP aware of this please advise.   Call back number [phone]

## 2021-03-11 NOTE — PROGRESS NOTES

## 2021-03-11 NOTE — TELEPHONE ENCOUNTER
???  I mean, she'd have to come back in if she wants me to look at them.  Have no idea what they are without looking.

## 2021-03-15 ENCOUNTER — OFFICE VISIT (OUTPATIENT)
Dept: CHIROPRACTIC MEDICINE | Facility: OTHER | Age: 31
End: 2021-03-15
Attending: CHIROPRACTOR
Payer: COMMERCIAL

## 2021-03-15 ENCOUNTER — HOSPITAL ENCOUNTER (OUTPATIENT)
Dept: PHYSICAL THERAPY | Facility: HOSPITAL | Age: 31
Setting detail: THERAPIES SERIES
End: 2021-03-15
Attending: CHIROPRACTOR
Payer: COMMERCIAL

## 2021-03-15 DIAGNOSIS — M54.50 ACUTE BILATERAL LOW BACK PAIN WITHOUT SCIATICA: ICD-10-CM

## 2021-03-15 DIAGNOSIS — M99.03 SEGMENTAL AND SOMATIC DYSFUNCTION OF LUMBAR REGION: Primary | ICD-10-CM

## 2021-03-15 DIAGNOSIS — M99.02 SEGMENTAL AND SOMATIC DYSFUNCTION OF THORACIC REGION: ICD-10-CM

## 2021-03-15 DIAGNOSIS — M99.01 SEGMENTAL AND SOMATIC DYSFUNCTION OF CERVICAL REGION: ICD-10-CM

## 2021-03-15 PROCEDURE — 97140 MANUAL THERAPY 1/> REGIONS: CPT | Mod: GP

## 2021-03-15 PROCEDURE — 98941 CHIROPRACT MANJ 3-4 REGIONS: CPT | Mod: AT | Performed by: CHIROPRACTOR

## 2021-03-15 PROCEDURE — 97110 THERAPEUTIC EXERCISES: CPT | Mod: GP

## 2021-03-17 NOTE — PROGRESS NOTES

## 2021-03-19 NOTE — PROGRESS NOTES
"    Assessment & Plan     Sebaceous cyst  Discussed options -- could see general surgery but she would like to wait  Discussed possible infections  Monitor and if worsening will refer to gen surg          Patient was agreeable to this plan and had no further questions.  There are no Patient Instructions on file for this visit.    No follow-ups on file.    Debby Floyd MD  [facility] - DOUGLAS Vaughn is a 30 year old who presents for the following health issues   HPI     Skin Lesion  Onset/Duration: patient does not know but maybe around 1 year  Description  Location: spread throughout her head  Color: brown and pink  Border description: bump on scalp  Character: flakey  Itching: no  Bleeding:  no  Intensity:  mild  Progression of Symptoms:  same  Accompanying signs and symptoms:   Bleeding: no  Scaling: YES  Excessive sun exposure/tanning: no  Sunscreen used: no  History:           Any previous history of skin cancer: YES  Any family history of melanoma: YES  Previous episodes of similar lesion: YES  Precipitating or alleviating factors: none  Therapies tried and outcome: none    Review of Systems   Constitutional, HEENT, cardiovascular, pulmonary, gi and gu systems are negative, except as otherwise noted.      Objective    /60 (BP Location: Right arm, Patient Position: Sitting, Cuff Size: Adult Regular)   Pulse 90   Temp 98.2  F (36.8  C) (Tympanic)   Ht 1.702 m (5' 7\")   Wt 63.4 kg (139 lb 12.8 oz)   LMP 03/08/2021 (LMP Unknown)   SpO2 97%   BMI 21.90 kg/m    There is no height or weight on file to calculate BMI.  Physical Exam   GENERAL: healthy, alert and no distress  NECK: no adenopathy, no asymmetry, masses, or scars and thyroid normal to palpation  SKIN: papule - head and scalp approximately 1cm in diameter x 4-5 locations  PSYCH: mentation appears normal, affect normal/bright    No results found for any visits on 03/23/21.          "

## 2021-03-23 ENCOUNTER — OFFICE VISIT (OUTPATIENT)
Dept: FAMILY MEDICINE | Facility: OTHER | Age: 31
End: 2021-03-23
Attending: FAMILY MEDICINE
Payer: COMMERCIAL

## 2021-03-23 VITALS
HEIGHT: 67 IN | SYSTOLIC BLOOD PRESSURE: 104 MMHG | TEMPERATURE: 98.2 F | HEART RATE: 90 BPM | OXYGEN SATURATION: 97 % | DIASTOLIC BLOOD PRESSURE: 60 MMHG | WEIGHT: 139.8 LBS | BODY MASS INDEX: 21.94 KG/M2

## 2021-03-23 DIAGNOSIS — L72.3 SEBACEOUS CYST: Primary | ICD-10-CM

## 2021-03-23 PROCEDURE — 99213 OFFICE O/P EST LOW 20 MIN: CPT | Performed by: FAMILY MEDICINE

## 2021-03-23 ASSESSMENT — PAIN SCALES - GENERAL: PAINLEVEL: NO PAIN (0)

## 2021-03-23 ASSESSMENT — MIFFLIN-ST. JEOR: SCORE: 1376.76

## 2021-03-23 NOTE — NURSING NOTE
"Chief Complaint   Patient presents with     Derm Problem       Initial Blood Pressure 104/60 (BP Location: Right arm, Patient Position: Sitting, Cuff Size: Adult Regular)   Pulse 90   Temperature 98.2  F (36.8  C) (Tympanic)   Height 1.702 m (5' 7\")   Weight 63.4 kg (139 lb 12.8 oz)   Last Menstrual Period 03/08/2021 (LMP Unknown)   Oxygen Saturation 97%   Body Mass Index 21.90 kg/m   Estimated body mass index is 21.9 kg/m  as calculated from the following:    Height as of this encounter: 1.702 m (5' 7\").    Weight as of this encounter: 63.4 kg (139 lb 12.8 oz).  Medication Reconciliation: complete  Sonal Gonzalez LPN  "

## 2021-03-24 ENCOUNTER — HOSPITAL ENCOUNTER (OUTPATIENT)
Dept: PHYSICAL THERAPY | Facility: HOSPITAL | Age: 31
Setting detail: THERAPIES SERIES
End: 2021-03-24
Attending: CHIROPRACTOR
Payer: COMMERCIAL

## 2021-03-24 ENCOUNTER — OFFICE VISIT (OUTPATIENT)
Dept: CHIROPRACTIC MEDICINE | Facility: OTHER | Age: 31
End: 2021-03-24
Attending: CHIROPRACTOR
Payer: COMMERCIAL

## 2021-03-24 DIAGNOSIS — M99.02 SEGMENTAL AND SOMATIC DYSFUNCTION OF THORACIC REGION: ICD-10-CM

## 2021-03-24 DIAGNOSIS — M99.03 SEGMENTAL AND SOMATIC DYSFUNCTION OF LUMBAR REGION: Primary | ICD-10-CM

## 2021-03-24 DIAGNOSIS — M99.01 SEGMENTAL AND SOMATIC DYSFUNCTION OF CERVICAL REGION: ICD-10-CM

## 2021-03-24 DIAGNOSIS — M54.50 ACUTE BILATERAL LOW BACK PAIN WITHOUT SCIATICA: ICD-10-CM

## 2021-03-24 PROCEDURE — 97140 MANUAL THERAPY 1/> REGIONS: CPT | Mod: GP

## 2021-03-24 PROCEDURE — 98941 CHIROPRACT MANJ 3-4 REGIONS: CPT | Mod: AT | Performed by: CHIROPRACTOR

## 2021-03-25 NOTE — PROGRESS NOTES

## 2021-03-29 ENCOUNTER — HOSPITAL ENCOUNTER (OUTPATIENT)
Dept: PHYSICAL THERAPY | Facility: HOSPITAL | Age: 31
Setting detail: THERAPIES SERIES
End: 2021-03-29
Attending: CHIROPRACTOR
Payer: COMMERCIAL

## 2021-03-29 PROCEDURE — 97110 THERAPEUTIC EXERCISES: CPT | Mod: GP

## 2021-03-29 PROCEDURE — 97140 MANUAL THERAPY 1/> REGIONS: CPT | Mod: GP

## 2021-04-12 ENCOUNTER — OFFICE VISIT (OUTPATIENT)
Dept: CHIROPRACTIC MEDICINE | Facility: OTHER | Age: 31
End: 2021-04-12
Attending: CHIROPRACTOR
Payer: COMMERCIAL

## 2021-04-12 DIAGNOSIS — M99.02 SEGMENTAL AND SOMATIC DYSFUNCTION OF THORACIC REGION: ICD-10-CM

## 2021-04-12 DIAGNOSIS — M99.01 SEGMENTAL AND SOMATIC DYSFUNCTION OF CERVICAL REGION: Primary | ICD-10-CM

## 2021-04-12 DIAGNOSIS — M54.2 CERVICALGIA: ICD-10-CM

## 2021-04-12 DIAGNOSIS — M99.03 SEGMENTAL AND SOMATIC DYSFUNCTION OF LUMBAR REGION: ICD-10-CM

## 2021-04-12 PROCEDURE — 98941 CHIROPRACT MANJ 3-4 REGIONS: CPT | Mod: AT | Performed by: CHIROPRACTOR

## 2021-04-13 NOTE — PROGRESS NOTES
Subjective Finding:    Chief compalint: Patient presents with:  Neck Pain  Back Pain  , Pain Scale: 7/10, Intensity: sharp, Duration: 1 weeks, Radiating: no.    Date of injury:     Activities that the pain restricts:   Home/household/hobbies/social activities: yes.  Work duties: yes.  Sleep: yes.  Makes symptoms better: rest.  Makes symptoms worse: activity.  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 Yes: MD.  Work related: no.  Automobile related injury: no.    Objective and Assessment:    Posture Analysis:   High shoulder: .  Head tilt: .  High iliac crest: .  Head carriage: forward.  Thoracic Kyphosis: neutral.  Lumbar Lordosis: forward.    Lumbar Range of Motion: extension decreased.  Cervical Range of Motion: extension decreased.  Thoracic Range of Motion: extension decreased.  Extremity Range of Motion: .    Palpation:   Quad lumb: bilateral, referred pain: no  Lev scapulae: dull pain, referred pain: no    Segmental dysfunction pre-treatment and treatment area: C3, C4, T5, L4 and L5.    Assessment post-treatment:  Cervical: ROM increased.  Thoracic: ROM increased.  Lumbar: ROM increased.    Comments: .  Post pregnancy    Complicating Factors: .    Procedure(s):  CMT:  72286 Chiropractic manipulative treatment 3-4 regions performed   Cervical: Diversified, See above for level, Supine, Thoracic: Diversified, See above for level, Prone and Lumbar: Diversified, See above for level, Side posture    Modalities:  None performed this visit    Therapeutic procedures:  None    Plan:  Treatment plan: PRN.  Instructed patient: stretch as instructed at visit.  Short term goals: reduce pain.  Long term goals: restore normal function.  Prognosis: excellent.

## 2021-04-19 ENCOUNTER — OFFICE VISIT (OUTPATIENT)
Dept: CHIROPRACTIC MEDICINE | Facility: OTHER | Age: 31
End: 2021-04-19
Attending: CHIROPRACTOR
Payer: COMMERCIAL

## 2021-04-19 DIAGNOSIS — M99.01 SEGMENTAL AND SOMATIC DYSFUNCTION OF CERVICAL REGION: ICD-10-CM

## 2021-04-19 DIAGNOSIS — M99.03 SEGMENTAL AND SOMATIC DYSFUNCTION OF LUMBAR REGION: Primary | ICD-10-CM

## 2021-04-19 DIAGNOSIS — M54.50 ACUTE BILATERAL LOW BACK PAIN WITHOUT SCIATICA: ICD-10-CM

## 2021-04-19 DIAGNOSIS — M99.02 SEGMENTAL AND SOMATIC DYSFUNCTION OF THORACIC REGION: ICD-10-CM

## 2021-04-19 PROCEDURE — 98941 CHIROPRACT MANJ 3-4 REGIONS: CPT | Mod: AT | Performed by: CHIROPRACTOR

## 2021-04-19 NOTE — PROGRESS NOTES
Outpatient Physical Therapy Discharge Note     Patient: Johana Duff  : 1990    Beginning/End Dates of Reporting Period:  21 o 3/29/2021    Referring Provider: Dr. Floyd    Referring Physician: Luis Park   Orders: Eval and Treat  Medical Diagnosis: Segmental and somatic dysfunction of lumbar region [M99.03]   Onset of Illness/Injury: Late 2020     Client Self Report: Patient is doing well.     Assessment: Patient has met long term goals and is appropriate for discharge from PT services. Patient is ready to transition to independent HEP and is able to self address symptoms.     Goals:  LT21  LTG 1: Patient will be able to perform full range of lumbar mobility pain free and perform bending, lifting, reaching and carrying tasks at home with good mechanics without limitation from pain.   GOAL MET 3/29/21  LTG 2: Patient will be able to perform prolonged sitting, standing and walking without limitation from pain.   GOAL MET 2/15/21  LTG 3: Patient will be able to return to prior baseline without limitation from back pain.   GOAL MET 3/29/21  LTG 4: Patient will be able to independently perform HEP and continue back health concepts to decrease risk of recurrence.   GOAL MET 2/15/21     LTG 5: Patient will report decreased frequency and intensity of HA and cervical pain allowing increased tolerance for household chores, daily childcare tasks and recreational activities.   GOAL MET 3/29/21     LTG 6: Patient will have program strategies and exercises that are effective at minimizing or preventing onset of cervical pain and HA symptoms.   GOAL MET 3/29/21    Plan:  Discharge from therapy.    Discharge:    Reason for Discharge: Patient has met all goals.    Equipment Issued: HEP    Discharge Plan: Patient to continue home program.

## 2021-04-22 NOTE — PROGRESS NOTES

## 2021-04-29 ENCOUNTER — OFFICE VISIT (OUTPATIENT)
Dept: CHIROPRACTIC MEDICINE | Facility: OTHER | Age: 31
End: 2021-04-29
Attending: CHIROPRACTOR
Payer: COMMERCIAL

## 2021-04-29 DIAGNOSIS — M99.03 SEGMENTAL AND SOMATIC DYSFUNCTION OF LUMBAR REGION: Primary | ICD-10-CM

## 2021-04-29 DIAGNOSIS — M99.02 SEGMENTAL AND SOMATIC DYSFUNCTION OF THORACIC REGION: ICD-10-CM

## 2021-04-29 DIAGNOSIS — M99.01 SEGMENTAL AND SOMATIC DYSFUNCTION OF CERVICAL REGION: ICD-10-CM

## 2021-04-29 DIAGNOSIS — M54.50 ACUTE BILATERAL LOW BACK PAIN WITHOUT SCIATICA: ICD-10-CM

## 2021-04-29 PROCEDURE — 98941 CHIROPRACT MANJ 3-4 REGIONS: CPT | Mod: AT | Performed by: CHIROPRACTOR

## 2021-05-03 ENCOUNTER — OFFICE VISIT (OUTPATIENT)
Dept: CHIROPRACTIC MEDICINE | Facility: OTHER | Age: 31
End: 2021-05-03
Attending: CHIROPRACTOR
Payer: COMMERCIAL

## 2021-05-03 DIAGNOSIS — M99.02 SEGMENTAL AND SOMATIC DYSFUNCTION OF THORACIC REGION: ICD-10-CM

## 2021-05-03 DIAGNOSIS — M54.50 ACUTE BILATERAL LOW BACK PAIN WITHOUT SCIATICA: ICD-10-CM

## 2021-05-03 DIAGNOSIS — M99.03 SEGMENTAL AND SOMATIC DYSFUNCTION OF LUMBAR REGION: Primary | ICD-10-CM

## 2021-05-03 DIAGNOSIS — M99.01 SEGMENTAL AND SOMATIC DYSFUNCTION OF CERVICAL REGION: ICD-10-CM

## 2021-05-03 PROCEDURE — 98941 CHIROPRACT MANJ 3-4 REGIONS: CPT | Mod: AT | Performed by: CHIROPRACTOR

## 2021-05-05 NOTE — PROGRESS NOTES

## 2021-05-10 ENCOUNTER — OFFICE VISIT (OUTPATIENT)
Dept: CHIROPRACTIC MEDICINE | Facility: OTHER | Age: 31
End: 2021-05-10
Attending: CHIROPRACTOR
Payer: COMMERCIAL

## 2021-05-10 DIAGNOSIS — M99.03 SEGMENTAL AND SOMATIC DYSFUNCTION OF LUMBAR REGION: Primary | ICD-10-CM

## 2021-05-10 DIAGNOSIS — M99.01 SEGMENTAL AND SOMATIC DYSFUNCTION OF CERVICAL REGION: ICD-10-CM

## 2021-05-10 DIAGNOSIS — M54.50 ACUTE BILATERAL LOW BACK PAIN WITHOUT SCIATICA: ICD-10-CM

## 2021-05-10 DIAGNOSIS — M99.02 SEGMENTAL AND SOMATIC DYSFUNCTION OF THORACIC REGION: ICD-10-CM

## 2021-05-10 PROCEDURE — 98941 CHIROPRACT MANJ 3-4 REGIONS: CPT | Mod: AT | Performed by: CHIROPRACTOR

## 2021-05-11 NOTE — PROGRESS NOTES

## 2021-05-17 ENCOUNTER — OFFICE VISIT (OUTPATIENT)
Dept: CHIROPRACTIC MEDICINE | Facility: OTHER | Age: 31
End: 2021-05-17
Attending: CHIROPRACTOR
Payer: COMMERCIAL

## 2021-05-17 DIAGNOSIS — M99.02 SEGMENTAL AND SOMATIC DYSFUNCTION OF THORACIC REGION: ICD-10-CM

## 2021-05-17 DIAGNOSIS — M99.03 SEGMENTAL AND SOMATIC DYSFUNCTION OF LUMBAR REGION: Primary | ICD-10-CM

## 2021-05-17 DIAGNOSIS — M99.01 SEGMENTAL AND SOMATIC DYSFUNCTION OF CERVICAL REGION: ICD-10-CM

## 2021-05-17 DIAGNOSIS — M54.50 ACUTE BILATERAL LOW BACK PAIN WITHOUT SCIATICA: ICD-10-CM

## 2021-05-17 PROCEDURE — 98941 CHIROPRACT MANJ 3-4 REGIONS: CPT | Mod: AT | Performed by: CHIROPRACTOR

## 2021-05-17 NOTE — PROGRESS NOTES

## 2021-05-26 ENCOUNTER — OFFICE VISIT (OUTPATIENT)
Dept: CHIROPRACTIC MEDICINE | Facility: OTHER | Age: 31
End: 2021-05-26
Attending: CHIROPRACTOR
Payer: COMMERCIAL

## 2021-05-26 DIAGNOSIS — M99.01 SEGMENTAL AND SOMATIC DYSFUNCTION OF CERVICAL REGION: Primary | ICD-10-CM

## 2021-05-26 DIAGNOSIS — M99.03 SEGMENTAL AND SOMATIC DYSFUNCTION OF LUMBAR REGION: ICD-10-CM

## 2021-05-26 DIAGNOSIS — M99.02 SEGMENTAL AND SOMATIC DYSFUNCTION OF THORACIC REGION: ICD-10-CM

## 2021-05-26 DIAGNOSIS — M54.2 CERVICALGIA: ICD-10-CM

## 2021-05-26 PROCEDURE — 98941 CHIROPRACT MANJ 3-4 REGIONS: CPT | Mod: AT | Performed by: CHIROPRACTOR

## 2021-06-03 NOTE — PROGRESS NOTES

## 2021-06-07 ENCOUNTER — OFFICE VISIT (OUTPATIENT)
Dept: CHIROPRACTIC MEDICINE | Facility: OTHER | Age: 31
End: 2021-06-07
Attending: CHIROPRACTOR
Payer: COMMERCIAL

## 2021-06-07 DIAGNOSIS — M54.50 ACUTE BILATERAL LOW BACK PAIN WITHOUT SCIATICA: ICD-10-CM

## 2021-06-07 DIAGNOSIS — M99.02 SEGMENTAL AND SOMATIC DYSFUNCTION OF THORACIC REGION: ICD-10-CM

## 2021-06-07 DIAGNOSIS — M99.01 SEGMENTAL AND SOMATIC DYSFUNCTION OF CERVICAL REGION: ICD-10-CM

## 2021-06-07 DIAGNOSIS — M99.03 SEGMENTAL AND SOMATIC DYSFUNCTION OF LUMBAR REGION: Primary | ICD-10-CM

## 2021-06-07 PROCEDURE — 98941 CHIROPRACT MANJ 3-4 REGIONS: CPT | Mod: AT | Performed by: CHIROPRACTOR

## 2021-06-09 NOTE — PROGRESS NOTES

## 2021-06-16 ENCOUNTER — OFFICE VISIT (OUTPATIENT)
Dept: CHIROPRACTIC MEDICINE | Facility: OTHER | Age: 31
End: 2021-06-16
Attending: CHIROPRACTOR
Payer: COMMERCIAL

## 2021-06-16 DIAGNOSIS — M99.01 SEGMENTAL AND SOMATIC DYSFUNCTION OF CERVICAL REGION: ICD-10-CM

## 2021-06-16 DIAGNOSIS — M99.03 SEGMENTAL AND SOMATIC DYSFUNCTION OF LUMBAR REGION: Primary | ICD-10-CM

## 2021-06-16 DIAGNOSIS — M54.50 ACUTE BILATERAL LOW BACK PAIN WITHOUT SCIATICA: ICD-10-CM

## 2021-06-16 DIAGNOSIS — M99.02 SEGMENTAL AND SOMATIC DYSFUNCTION OF THORACIC REGION: ICD-10-CM

## 2021-06-16 PROCEDURE — 98941 CHIROPRACT MANJ 3-4 REGIONS: CPT | Mod: AT | Performed by: CHIROPRACTOR

## 2021-06-21 NOTE — PROGRESS NOTES

## 2021-07-07 ENCOUNTER — OFFICE VISIT (OUTPATIENT)
Dept: CHIROPRACTIC MEDICINE | Facility: OTHER | Age: 31
End: 2021-07-07
Attending: CHIROPRACTOR
Payer: COMMERCIAL

## 2021-07-07 DIAGNOSIS — M54.50 ACUTE BILATERAL LOW BACK PAIN WITHOUT SCIATICA: ICD-10-CM

## 2021-07-07 DIAGNOSIS — M99.02 SEGMENTAL AND SOMATIC DYSFUNCTION OF THORACIC REGION: ICD-10-CM

## 2021-07-07 DIAGNOSIS — M99.03 SEGMENTAL AND SOMATIC DYSFUNCTION OF LUMBAR REGION: Primary | ICD-10-CM

## 2021-07-07 DIAGNOSIS — M99.01 SEGMENTAL AND SOMATIC DYSFUNCTION OF CERVICAL REGION: ICD-10-CM

## 2021-07-07 PROCEDURE — 98941 CHIROPRACT MANJ 3-4 REGIONS: CPT | Mod: AT | Performed by: CHIROPRACTOR

## 2021-07-12 NOTE — PROGRESS NOTES

## 2021-07-14 ENCOUNTER — OFFICE VISIT (OUTPATIENT)
Dept: CHIROPRACTIC MEDICINE | Facility: OTHER | Age: 31
End: 2021-07-14
Attending: CHIROPRACTOR
Payer: COMMERCIAL

## 2021-07-14 DIAGNOSIS — M99.03 SEGMENTAL AND SOMATIC DYSFUNCTION OF LUMBAR REGION: Primary | ICD-10-CM

## 2021-07-14 DIAGNOSIS — M54.50 ACUTE BILATERAL LOW BACK PAIN WITHOUT SCIATICA: ICD-10-CM

## 2021-07-14 DIAGNOSIS — M99.02 SEGMENTAL AND SOMATIC DYSFUNCTION OF THORACIC REGION: ICD-10-CM

## 2021-07-14 DIAGNOSIS — M99.01 SEGMENTAL AND SOMATIC DYSFUNCTION OF CERVICAL REGION: ICD-10-CM

## 2021-07-14 PROCEDURE — 98941 CHIROPRACT MANJ 3-4 REGIONS: CPT | Mod: AT | Performed by: CHIROPRACTOR

## 2021-07-22 ENCOUNTER — OFFICE VISIT (OUTPATIENT)
Dept: CHIROPRACTIC MEDICINE | Facility: OTHER | Age: 31
End: 2021-07-22
Attending: CHIROPRACTOR
Payer: COMMERCIAL

## 2021-07-22 DIAGNOSIS — M54.50 ACUTE BILATERAL LOW BACK PAIN WITHOUT SCIATICA: ICD-10-CM

## 2021-07-22 DIAGNOSIS — M99.01 SEGMENTAL AND SOMATIC DYSFUNCTION OF CERVICAL REGION: ICD-10-CM

## 2021-07-22 DIAGNOSIS — M99.03 SEGMENTAL AND SOMATIC DYSFUNCTION OF LUMBAR REGION: Primary | ICD-10-CM

## 2021-07-22 DIAGNOSIS — M99.02 SEGMENTAL AND SOMATIC DYSFUNCTION OF THORACIC REGION: ICD-10-CM

## 2021-07-22 PROCEDURE — 98941 CHIROPRACT MANJ 3-4 REGIONS: CPT | Mod: AT | Performed by: CHIROPRACTOR

## 2021-07-22 NOTE — PROGRESS NOTES

## 2021-07-28 ENCOUNTER — OFFICE VISIT (OUTPATIENT)
Dept: CHIROPRACTIC MEDICINE | Facility: OTHER | Age: 31
End: 2021-07-28
Attending: CHIROPRACTOR
Payer: COMMERCIAL

## 2021-07-28 DIAGNOSIS — M99.02 SEGMENTAL AND SOMATIC DYSFUNCTION OF THORACIC REGION: ICD-10-CM

## 2021-07-28 DIAGNOSIS — M99.01 SEGMENTAL AND SOMATIC DYSFUNCTION OF CERVICAL REGION: Primary | ICD-10-CM

## 2021-07-28 DIAGNOSIS — M99.03 SEGMENTAL AND SOMATIC DYSFUNCTION OF LUMBAR REGION: ICD-10-CM

## 2021-07-28 DIAGNOSIS — M54.2 CERVICALGIA: ICD-10-CM

## 2021-07-28 PROCEDURE — 98941 CHIROPRACT MANJ 3-4 REGIONS: CPT | Mod: AT | Performed by: CHIROPRACTOR

## 2021-08-02 NOTE — PROGRESS NOTES

## 2021-08-04 ENCOUNTER — OFFICE VISIT (OUTPATIENT)
Dept: CHIROPRACTIC MEDICINE | Facility: OTHER | Age: 31
End: 2021-08-04
Attending: CHIROPRACTOR
Payer: COMMERCIAL

## 2021-08-04 DIAGNOSIS — M99.02 SEGMENTAL AND SOMATIC DYSFUNCTION OF THORACIC REGION: ICD-10-CM

## 2021-08-04 DIAGNOSIS — M99.01 SEGMENTAL AND SOMATIC DYSFUNCTION OF CERVICAL REGION: ICD-10-CM

## 2021-08-04 DIAGNOSIS — M54.50 ACUTE BILATERAL LOW BACK PAIN WITHOUT SCIATICA: ICD-10-CM

## 2021-08-04 DIAGNOSIS — M99.03 SEGMENTAL AND SOMATIC DYSFUNCTION OF LUMBAR REGION: Primary | ICD-10-CM

## 2021-08-04 PROCEDURE — 98941 CHIROPRACT MANJ 3-4 REGIONS: CPT | Mod: AT | Performed by: CHIROPRACTOR

## 2021-08-04 NOTE — PROGRESS NOTES

## 2021-08-09 NOTE — PROGRESS NOTES
Subjective Finding:    Chief compalint: Patient presents with:  Back Pain  Neck Pain  , Pain Scale: 7/10, Intensity: sharp, Duration: 1 weeks, Radiating: no.    Date of injury:     Activities that the pain restricts:   Home/household/hobbies/social activities: yes.  Work duties: yes.  Sleep: yes.  Makes symptoms better: rest.  Makes symptoms worse: activity.  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 Yes: MD.  Work related: no.  Automobile related injury: no.    Objective and Assessment:    Posture Analysis:   High shoulder: .  Head tilt: .  High iliac crest: .  Head carriage: forward.  Thoracic Kyphosis: neutral.  Lumbar Lordosis: forward.    Lumbar Range of Motion: extension decreased.  Cervical Range of Motion: extension decreased.  Thoracic Range of Motion: extension decreased.  Extremity Range of Motion: .    Palpation:   Quad lumb: bilateral, referred pain: no  Lev scapulae: dull pain, referred pain: no    Segmental dysfunction pre-treatment and treatment area: C3, C4, T5, L4 and L5.    Assessment post-treatment:  Cervical: ROM increased.  Thoracic: ROM increased.  Lumbar: ROM increased.    Comments: .  Post pregnancy    Complicating Factors: .    Procedure(s):  CMT:  70602 Chiropractic manipulative treatment 3-4 regions performed   Cervical: Diversified, See above for level, Supine, Thoracic: Diversified, See above for level, Prone and Lumbar: Diversified, See above for level, Side posture    Modalities:  None performed this visit    Therapeutic procedures:  None    Plan:  Treatment plan: PRN.  Instructed patient: stretch as instructed at visit.  Short term goals: reduce pain.  Long term goals: restore normal function.  Prognosis: excellent.

## 2021-08-18 ENCOUNTER — OFFICE VISIT (OUTPATIENT)
Dept: CHIROPRACTIC MEDICINE | Facility: OTHER | Age: 31
End: 2021-08-18
Attending: CHIROPRACTOR
Payer: COMMERCIAL

## 2021-08-18 DIAGNOSIS — M99.03 SEGMENTAL AND SOMATIC DYSFUNCTION OF LUMBAR REGION: Primary | ICD-10-CM

## 2021-08-18 DIAGNOSIS — M99.02 SEGMENTAL AND SOMATIC DYSFUNCTION OF THORACIC REGION: ICD-10-CM

## 2021-08-18 DIAGNOSIS — M54.50 ACUTE BILATERAL LOW BACK PAIN WITHOUT SCIATICA: ICD-10-CM

## 2021-08-18 DIAGNOSIS — M99.01 SEGMENTAL AND SOMATIC DYSFUNCTION OF CERVICAL REGION: ICD-10-CM

## 2021-08-18 PROCEDURE — 98941 CHIROPRACT MANJ 3-4 REGIONS: CPT | Mod: AT | Performed by: CHIROPRACTOR

## 2021-08-18 NOTE — PROGRESS NOTES
Subjective Finding:    Chief compalint: Patient presents with:  Neck Pain  Back Pain  , Pain Scale: 7/10, Intensity: sharp, Duration: 1 weeks, Radiating: no.    Date of injury:     Activities that the pain restricts:   Home/household/hobbies/social activities: yes.  Work duties: yes.  Sleep: yes.  Makes symptoms better: rest.  Makes symptoms worse: activity.  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 Yes: MD.  Work related: no.  Automobile related injury: no.    Objective and Assessment:    Posture Analysis:   High shoulder: .  Head tilt: .  High iliac crest: .  Head carriage: forward.  Thoracic Kyphosis: neutral.  Lumbar Lordosis: forward.    Lumbar Range of Motion: extension decreased.  Cervical Range of Motion: extension decreased.  Thoracic Range of Motion: extension decreased.  Extremity Range of Motion: .    Palpation:   Quad lumb: bilateral, referred pain: no  Lev scapulae: dull pain, referred pain: no    Segmental dysfunction pre-treatment and treatment area: C3, C4, T5, L4 and L5.    Assessment post-treatment:  Cervical: ROM increased.  Thoracic: ROM increased.  Lumbar: ROM increased.    Comments: .  Post pregnancy    Complicating Factors: .    Procedure(s):  CMT:  19984 Chiropractic manipulative treatment 3-4 regions performed   Cervical: Diversified, See above for level, Supine, Thoracic: Diversified, See above for level, Prone and Lumbar: Diversified, See above for level, Side posture    Modalities:  None performed this visit    Therapeutic procedures:  None    Plan:  Treatment plan: PRN.  Instructed patient: stretch as instructed at visit.  Short term goals: reduce pain.  Long term goals: restore normal function.  Prognosis: excellent.

## 2021-08-24 ENCOUNTER — OFFICE VISIT (OUTPATIENT)
Dept: CHIROPRACTIC MEDICINE | Facility: OTHER | Age: 31
End: 2021-08-24
Attending: CHIROPRACTOR
Payer: COMMERCIAL

## 2021-08-24 DIAGNOSIS — M54.50 ACUTE BILATERAL LOW BACK PAIN WITHOUT SCIATICA: ICD-10-CM

## 2021-08-24 DIAGNOSIS — M99.02 SEGMENTAL AND SOMATIC DYSFUNCTION OF THORACIC REGION: ICD-10-CM

## 2021-08-24 DIAGNOSIS — M99.01 SEGMENTAL AND SOMATIC DYSFUNCTION OF CERVICAL REGION: ICD-10-CM

## 2021-08-24 DIAGNOSIS — M99.03 SEGMENTAL AND SOMATIC DYSFUNCTION OF LUMBAR REGION: Primary | ICD-10-CM

## 2021-08-24 PROCEDURE — 98941 CHIROPRACT MANJ 3-4 REGIONS: CPT | Mod: AT | Performed by: CHIROPRACTOR

## 2021-08-25 NOTE — PROGRESS NOTES

## 2021-08-30 ENCOUNTER — OFFICE VISIT (OUTPATIENT)
Dept: CHIROPRACTIC MEDICINE | Facility: OTHER | Age: 31
End: 2021-08-30
Attending: CHIROPRACTOR
Payer: COMMERCIAL

## 2021-08-30 DIAGNOSIS — M99.01 SEGMENTAL AND SOMATIC DYSFUNCTION OF CERVICAL REGION: ICD-10-CM

## 2021-08-30 DIAGNOSIS — M54.50 ACUTE BILATERAL LOW BACK PAIN WITHOUT SCIATICA: ICD-10-CM

## 2021-08-30 DIAGNOSIS — M99.02 SEGMENTAL AND SOMATIC DYSFUNCTION OF THORACIC REGION: ICD-10-CM

## 2021-08-30 DIAGNOSIS — M99.03 SEGMENTAL AND SOMATIC DYSFUNCTION OF LUMBAR REGION: Primary | ICD-10-CM

## 2021-08-30 PROCEDURE — 98941 CHIROPRACT MANJ 3-4 REGIONS: CPT | Mod: AT | Performed by: CHIROPRACTOR

## 2021-08-30 NOTE — PROGRESS NOTES

## 2021-09-07 ENCOUNTER — OFFICE VISIT (OUTPATIENT)
Dept: CHIROPRACTIC MEDICINE | Facility: OTHER | Age: 31
End: 2021-09-07
Attending: CHIROPRACTOR
Payer: COMMERCIAL

## 2021-09-07 DIAGNOSIS — M54.50 ACUTE BILATERAL LOW BACK PAIN WITHOUT SCIATICA: ICD-10-CM

## 2021-09-07 DIAGNOSIS — M99.03 SEGMENTAL AND SOMATIC DYSFUNCTION OF LUMBAR REGION: Primary | ICD-10-CM

## 2021-09-07 DIAGNOSIS — M99.02 SEGMENTAL AND SOMATIC DYSFUNCTION OF THORACIC REGION: ICD-10-CM

## 2021-09-07 DIAGNOSIS — M99.01 SEGMENTAL AND SOMATIC DYSFUNCTION OF CERVICAL REGION: ICD-10-CM

## 2021-09-07 PROCEDURE — 98941 CHIROPRACT MANJ 3-4 REGIONS: CPT | Mod: AT | Performed by: CHIROPRACTOR

## 2021-09-14 NOTE — PROGRESS NOTES
Subjective Finding:    Chief compalint: Patient presents with:  Back Pain  , Pain Scale: 7/10, Intensity: sharp, Duration: 1 weeks, Radiating: no.    Date of injury:     Activities that the pain restricts:   Home/household/hobbies/social activities: yes.  Work duties: yes.  Sleep: yes.  Makes symptoms better: rest.  Makes symptoms worse: activity.  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 Yes: MD.  Work related: no.  Automobile related injury: no.    Objective and Assessment:    Posture Analysis:   High shoulder: .  Head tilt: .  High iliac crest: .  Head carriage: forward.  Thoracic Kyphosis: neutral.  Lumbar Lordosis: forward.    Lumbar Range of Motion: extension decreased.  Cervical Range of Motion: extension decreased.  Thoracic Range of Motion: extension decreased.  Extremity Range of Motion: .    Palpation:   Quad lumb: bilateral, referred pain: no  Lev scapulae: dull pain, referred pain: no    Segmental dysfunction pre-treatment and treatment area: C3, C4, T5, L4 and L5.    Assessment post-treatment:  Cervical: ROM increased.  Thoracic: ROM increased.  Lumbar: ROM increased.    Comments: .  Post pregnancy    Complicating Factors: .    Procedure(s):  CMT:  12551 Chiropractic manipulative treatment 3-4 regions performed   Cervical: Diversified, See above for level, Supine, Thoracic: Diversified, See above for level, Prone and Lumbar: Diversified, See above for level, Side posture    Modalities:  None performed this visit    Therapeutic procedures:  None    Plan:  Treatment plan: PRN.  Instructed patient: stretch as instructed at visit.  Short term goals: reduce pain.  Long term goals: restore normal function.  Prognosis: excellent.

## 2021-10-04 ENCOUNTER — OFFICE VISIT (OUTPATIENT)
Dept: CHIROPRACTIC MEDICINE | Facility: OTHER | Age: 31
End: 2021-10-04
Attending: CHIROPRACTOR
Payer: COMMERCIAL

## 2021-10-04 DIAGNOSIS — M99.03 SEGMENTAL AND SOMATIC DYSFUNCTION OF LUMBAR REGION: Primary | ICD-10-CM

## 2021-10-04 DIAGNOSIS — M99.01 SEGMENTAL AND SOMATIC DYSFUNCTION OF CERVICAL REGION: ICD-10-CM

## 2021-10-04 DIAGNOSIS — M54.50 ACUTE BILATERAL LOW BACK PAIN WITHOUT SCIATICA: ICD-10-CM

## 2021-10-04 DIAGNOSIS — M99.02 SEGMENTAL AND SOMATIC DYSFUNCTION OF THORACIC REGION: ICD-10-CM

## 2021-10-04 PROCEDURE — 98941 CHIROPRACT MANJ 3-4 REGIONS: CPT | Mod: AT | Performed by: CHIROPRACTOR

## 2021-10-05 NOTE — PROGRESS NOTES

## 2021-11-04 ENCOUNTER — OFFICE VISIT (OUTPATIENT)
Dept: CHIROPRACTIC MEDICINE | Facility: OTHER | Age: 31
End: 2021-11-04
Attending: CHIROPRACTOR
Payer: COMMERCIAL

## 2021-11-04 DIAGNOSIS — M54.50 ACUTE BILATERAL LOW BACK PAIN WITHOUT SCIATICA: ICD-10-CM

## 2021-11-04 DIAGNOSIS — M99.03 SEGMENTAL AND SOMATIC DYSFUNCTION OF LUMBAR REGION: Primary | ICD-10-CM

## 2021-11-04 DIAGNOSIS — M99.01 SEGMENTAL AND SOMATIC DYSFUNCTION OF CERVICAL REGION: ICD-10-CM

## 2021-11-04 DIAGNOSIS — M99.02 SEGMENTAL AND SOMATIC DYSFUNCTION OF THORACIC REGION: ICD-10-CM

## 2021-11-04 PROCEDURE — 98941 CHIROPRACT MANJ 3-4 REGIONS: CPT | Mod: AT | Performed by: CHIROPRACTOR

## 2021-11-08 NOTE — PROGRESS NOTES

## 2022-03-21 ENCOUNTER — OFFICE VISIT (OUTPATIENT)
Dept: CHIROPRACTIC MEDICINE | Facility: OTHER | Age: 32
End: 2022-03-21
Attending: CHIROPRACTOR
Payer: COMMERCIAL

## 2022-03-21 DIAGNOSIS — M99.03 SEGMENTAL AND SOMATIC DYSFUNCTION OF LUMBAR REGION: Primary | ICD-10-CM

## 2022-03-21 DIAGNOSIS — M54.50 ACUTE BILATERAL LOW BACK PAIN WITHOUT SCIATICA: ICD-10-CM

## 2022-03-21 DIAGNOSIS — M99.02 SEGMENTAL AND SOMATIC DYSFUNCTION OF THORACIC REGION: ICD-10-CM

## 2022-03-21 DIAGNOSIS — M99.01 SEGMENTAL AND SOMATIC DYSFUNCTION OF CERVICAL REGION: ICD-10-CM

## 2022-03-21 PROCEDURE — 99212 OFFICE O/P EST SF 10 MIN: CPT | Mod: 25 | Performed by: CHIROPRACTOR

## 2022-03-21 PROCEDURE — 98941 CHIROPRACT MANJ 3-4 REGIONS: CPT | Mod: AT | Performed by: CHIROPRACTOR

## 2022-03-22 NOTE — PROGRESS NOTES

## 2022-05-24 ENCOUNTER — E-VISIT (OUTPATIENT)
Dept: FAMILY MEDICINE | Facility: OTHER | Age: 32
End: 2022-05-24
Attending: FAMILY MEDICINE
Payer: COMMERCIAL

## 2022-05-24 DIAGNOSIS — J06.9 VIRAL URI: Primary | ICD-10-CM

## 2022-05-24 PROCEDURE — 99421 OL DIG E/M SVC 5-10 MIN: CPT | Performed by: FAMILY MEDICINE

## 2022-05-25 ENCOUNTER — HOSPITAL ENCOUNTER (EMERGENCY)
Facility: HOSPITAL | Age: 32
Discharge: HOME | End: 2022-05-25
Attending: NURSE PRACTITIONER | Admitting: NURSE PRACTITIONER
Payer: COMMERCIAL

## 2022-05-25 VITALS
SYSTOLIC BLOOD PRESSURE: 117 MMHG | OXYGEN SATURATION: 97 % | DIASTOLIC BLOOD PRESSURE: 75 MMHG | HEART RATE: 100 BPM | RESPIRATION RATE: 16 BRPM | TEMPERATURE: 99.1 F

## 2022-05-25 DIAGNOSIS — J32.9 SINUSITIS: Primary | ICD-10-CM

## 2022-05-25 DIAGNOSIS — J32.9 SINUSITIS, UNSPECIFIED CHRONICITY, UNSPECIFIED LOCATION: ICD-10-CM

## 2022-05-25 DIAGNOSIS — J06.9 UPPER RESPIRATORY INFECTION: ICD-10-CM

## 2022-05-25 PROCEDURE — 99213 OFFICE O/P EST LOW 20 MIN: CPT | Performed by: NURSE PRACTITIONER

## 2022-05-25 PROCEDURE — G0463 HOSPITAL OUTPT CLINIC VISIT: HCPCS

## 2022-05-25 ASSESSMENT — ENCOUNTER SYMPTOMS
FEVER: 1
SHORTNESS OF BREATH: 0
APPETITE CHANGE: 0
SINUS PAIN: 1
COUGH: 1
TROUBLE SWALLOWING: 0
SORE THROAT: 0

## 2022-05-25 NOTE — ED TRIAGE NOTES
Patient presents to urgent care for possible sinus infection since Sunday but allergies for 4 weeks. Patient has sinus pressure, eye pressure, nose congestion.   COVID test negative at home

## 2022-05-25 NOTE — ED PROVIDER NOTES
History     Chief Complaint   Patient presents with     Sinusitis     HPI  Johana Duff is a 31 year old female who presents ambulatory to urgent care for evaluation of URI symptoms.  Onset 4 weeks ago.  Patient reports sinus pressure/pain, nasal congestion, coughing up greenish discharge, postnasal drainage, headache and chest pain with coughing.  She had a fever of up to 100.4  F 4 to 5 days ago.  No trouble breathing.  Eating and drinking okay.  Recent negative home COVID-19 test.    Allergies:  Allergies   Allergen Reactions     Blue Dyes      Red Dye        Problem List:    Patient Active Problem List    Diagnosis Date Noted     Menorrhagia with regular cycle 03/10/2021     Priority: Medium     Dysmenorrhea 03/10/2021     Priority: Medium     Chronic intractable headache, unspecified headache type 02/10/2021     Priority: Medium     2019 novel coronavirus disease (COVID-19) 02/10/2021     Priority: Medium     Malignant melanoma of skin of right leg (H) 2021     Priority: Medium     Vertigo 2021     Priority: Medium     Hypovitaminosis D 2021     Priority: Medium     Paresthesia 2021     Priority: Medium     Fatigue, unspecified type 2021     Priority: Medium     Hair loss 2021     Priority: Medium     Atypical nevi 10/10/2019     Priority: Medium     Threatened labor 2019     Priority: Medium      (spontaneous vaginal delivery) 2019     Priority: Medium     Ember Beverly  8 lb 5.5 oz  Small 2nd degree with repair       Insufficient endocervical or transformation zone component in cervical specimen 2018     Priority: Medium     Use cytobrush       Bilateral carpal tunnel syndrome 10/10/2017     Priority: Medium     Plantar fasciitis 2017     Priority: Medium     Cervicalgia 2017     Priority: Medium     Congenital pectus excavatum 2013     Priority: Medium        Past Medical History:    Past Medical History:   Diagnosis Date      MD chamberlain. Pt continues with nausea after Reglan.    Flexural eczema      Intractable migraine without aura and without status migrainosus      Lactose intolerance      Melanoma (H) 10/2019     Ovarian cyst 2002     Thoracogenic scoliosis of thoracolumbar region        Past Surgical History:    Past Surgical History:   Procedure Laterality Date     wisdom teeth extraction  2014       Family History:    Family History   Problem Relation Age of Onset     Cerebrovascular Disease Maternal Grandmother         complications     Hypertension Maternal Grandmother      Back Pain Mother 55        disc herniation     Coronary Artery Disease Maternal Grandfather         s/p stents x 3     Lung Cancer Maternal Grandfather         +tobacco     Vertigo Father         lymes     Lactose Intolerance Sister      Emphysema Paternal Grandmother         +tobacco     Eczema Sister      Lactose Intolerance Sister      Emphysema Paternal Grandfather         +tobacco       Social History:  Marital Status:   [2]  Social History     Tobacco Use     Smoking status: Never Smoker     Smokeless tobacco: Never Used   Substance Use Topics     Alcohol use: Yes     Comment: 3/year     Drug use: No        Medications:    amoxicillin-clavulanate (AUGMENTIN) 875-125 MG tablet  cholecalciferol (VITAMIN D3) 5000 units TABS tablet  Multiple Vitamins-Minerals (OPTI-MULTI PO)          Review of Systems   Constitutional: Positive for fever (Resolved). Negative for appetite change.   HENT: Positive for congestion and sinus pain. Negative for sore throat and trouble swallowing.    Respiratory: Positive for cough. Negative for shortness of breath.    Cardiovascular: Positive for chest pain (With coughing).   All other systems reviewed and are negative.      Physical Exam   BP: 117/75  Pulse: 100  Temp: 99.1  F (37.3  C)  Resp: 16  SpO2: 97 %      Physical Exam  Vitals and nursing note reviewed.   Constitutional:       General: She is not in acute distress.     Appearance: Normal appearance. She is  well-developed. She is not diaphoretic.   HENT:      Head: Normocephalic and atraumatic.      Right Ear: Tympanic membrane and ear canal normal.      Left Ear: Tympanic membrane and ear canal normal.      Nose: Nose normal.      Mouth/Throat:      Mouth: Mucous membranes are moist.   Eyes:      Pupils: Pupils are equal, round, and reactive to light.   Cardiovascular:      Rate and Rhythm: Normal rate and regular rhythm.      Heart sounds: Normal heart sounds.   Pulmonary:      Effort: Pulmonary effort is normal. No respiratory distress.      Breath sounds: Normal breath sounds. No wheezing or rhonchi.   Musculoskeletal:         General: Normal range of motion.      Cervical back: Normal range of motion and neck supple.   Skin:     General: Skin is warm and dry.      Coloration: Skin is not pale.      Findings: No erythema or rash.   Neurological:      Mental Status: She is alert and oriented to person, place, and time.         ED Course                 Procedures         No results found for this or any previous visit (from the past 24 hour(s)).    Medications - No data to display    Assessments & Plan (with Medical Decision Making)     I have reviewed the nursing notes.    31-year-old female with URI symptoms x4 weeks.  Heart rate and rhythm are regular.  Respirations are nonlabored.  Patient mostly complaining of sinus pressure and pain.  Declines COVID-19 testing today as she had a negative home COVID test.  Vital signs within normal limits.    Discussed findings with patient.  Likely sinusitis.  Will treat with Augmentin.  Recommended oral decongestant.  Tylenol ibuprofen as needed for pain.  Follow-up with PCP as needed.  Return to ED/UC for worsening or concerning symptoms.    I have reviewed the findings, diagnosis, plan and need for follow up with the patient.  This document was prepared using a combination of typing and voice generated software.  While every attempt was made for accuracy, spelling and  grammatical errors may exist.    New Prescriptions    AMOXICILLIN-CLAVULANATE (AUGMENTIN) 875-125 MG TABLET    Take 1 tablet by mouth 2 times daily       Final diagnoses:   Sinusitis   Upper respiratory infection       5/25/2022   [facility]     Mpofu, Prudence, CNP  05/25/22 1917

## 2022-05-25 NOTE — DISCHARGE INSTRUCTIONS
Take antibiotic as prescribed until finished.    Take Tylenol or ibuprofen as needed for pain.  Consider taking an oral decongestant.    Follow-up with your doctor as needed.    Return to emergency department for worsening concerning symptoms.

## 2022-05-25 NOTE — PATIENT INSTRUCTIONS
Dear Johana Duff,    We are sorry you are not feeling well. Based on the responses you provided, it is recommended that you be seen in-person in urgent care so we can better evaluate your symptoms. Please click here to find the nearest urgent care location to you.   You will not be charged for this Visit. Thank you for trusting us with your care.    Debby Floyd MD

## 2022-05-25 NOTE — TELEPHONE ENCOUNTER
Provider E-Visit time total (minutes): 7  ELECTRONIC VISIT DOCUMENTATION:    SUBJECTIVE:  Note above accurately captures the substance of my conversation with the patient.    ASSESSMENT/ PLAN:  1. Viral URI    Debby Floyd MD  5/25/2022  6:17 PM

## 2022-07-09 ENCOUNTER — HEALTH MAINTENANCE LETTER (OUTPATIENT)
Age: 32
End: 2022-07-09

## 2022-08-09 ENCOUNTER — OFFICE VISIT (OUTPATIENT)
Dept: CHIROPRACTIC MEDICINE | Facility: OTHER | Age: 32
End: 2022-08-09
Attending: CHIROPRACTOR
Payer: COMMERCIAL

## 2022-08-09 DIAGNOSIS — M99.01 SEGMENTAL AND SOMATIC DYSFUNCTION OF CERVICAL REGION: ICD-10-CM

## 2022-08-09 DIAGNOSIS — M99.03 SEGMENTAL AND SOMATIC DYSFUNCTION OF LUMBAR REGION: Primary | ICD-10-CM

## 2022-08-09 DIAGNOSIS — M99.02 SEGMENTAL AND SOMATIC DYSFUNCTION OF THORACIC REGION: ICD-10-CM

## 2022-08-09 DIAGNOSIS — M54.50 ACUTE BILATERAL LOW BACK PAIN WITHOUT SCIATICA: ICD-10-CM

## 2022-08-09 PROCEDURE — 98941 CHIROPRACT MANJ 3-4 REGIONS: CPT | Mod: AT | Performed by: CHIROPRACTOR

## 2022-08-23 NOTE — PROGRESS NOTES
Subjective Finding:    Chief compalint: Patient presents with:  Back Pain  , Pain Scale: 7/10, Intensity: sharp, Duration: 1 weeks, Radiating: no.    Date of injury:     Activities that the pain restricts:   Home/household/hobbies/social activities: yes.  Work duties: yes.  Sleep: yes.  Makes symptoms better: rest.  Makes symptoms worse: activity.  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 Yes: MD.  Work related: no.  Automobile related injury: no.    Objective and Assessment:    Posture Analysis:   High shoulder: .  Head tilt: .  High iliac crest: .  Head carriage: forward.  Thoracic Kyphosis: neutral.  Lumbar Lordosis: forward.    Lumbar Range of Motion: extension decreased.  Cervical Range of Motion: extension decreased.  Thoracic Range of Motion: extension decreased.  Extremity Range of Motion: .    Palpation:   Quad lumb: bilateral, referred pain: no  Lev scapulae: dull pain, referred pain: no    Segmental dysfunction pre-treatment and treatment area: C3, C4, T5, L4 and L5.    Assessment post-treatment:  Cervical: ROM increased.  Thoracic: ROM increased.  Lumbar: ROM increased.    Comments: .  Post pregnancy    Complicating Factors: .    Procedure(s):  CMT:  83348 Chiropractic manipulative treatment 3-4 regions performed   Cervical: Diversified, See above for level, Supine, Thoracic: Diversified, See above for level, Prone and Lumbar: Diversified, See above for level, Side posture    Modalities:  None performed this visit    Therapeutic procedures:  None    Plan:  Treatment plan: PRN.  Instructed patient: stretch as instructed at visit.  Short term goals: reduce pain.  Long term goals: restore normal function.  Prognosis: excellent.

## 2022-09-04 ENCOUNTER — HEALTH MAINTENANCE LETTER (OUTPATIENT)
Age: 32
End: 2022-09-04

## 2022-10-03 ENCOUNTER — OFFICE VISIT (OUTPATIENT)
Dept: CHIROPRACTIC MEDICINE | Facility: OTHER | Age: 32
End: 2022-10-03
Attending: CHIROPRACTOR
Payer: COMMERCIAL

## 2022-10-03 DIAGNOSIS — M99.03 SEGMENTAL AND SOMATIC DYSFUNCTION OF LUMBAR REGION: ICD-10-CM

## 2022-10-03 DIAGNOSIS — M99.02 SEGMENTAL AND SOMATIC DYSFUNCTION OF THORACIC REGION: ICD-10-CM

## 2022-10-03 DIAGNOSIS — M99.01 SEGMENTAL AND SOMATIC DYSFUNCTION OF CERVICAL REGION: Primary | ICD-10-CM

## 2022-10-03 DIAGNOSIS — M54.2 CERVICALGIA: ICD-10-CM

## 2022-10-03 PROCEDURE — 99212 OFFICE O/P EST SF 10 MIN: CPT | Mod: 25 | Performed by: CHIROPRACTOR

## 2022-10-03 PROCEDURE — 98941 CHIROPRACT MANJ 3-4 REGIONS: CPT | Mod: AT | Performed by: CHIROPRACTOR
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
## 2022-10-06 ENCOUNTER — OFFICE VISIT (OUTPATIENT)
Dept: CHIROPRACTIC MEDICINE | Facility: OTHER | Age: 32
End: 2022-10-06
Attending: CHIROPRACTOR
Payer: COMMERCIAL

## 2022-10-06 DIAGNOSIS — M99.02 SEGMENTAL AND SOMATIC DYSFUNCTION OF THORACIC REGION: ICD-10-CM

## 2022-10-06 DIAGNOSIS — M54.2 CERVICALGIA: ICD-10-CM

## 2022-10-06 DIAGNOSIS — M99.01 SEGMENTAL AND SOMATIC DYSFUNCTION OF CERVICAL REGION: Primary | ICD-10-CM

## 2022-10-06 DIAGNOSIS — M99.03 SEGMENTAL AND SOMATIC DYSFUNCTION OF LUMBAR REGION: ICD-10-CM

## 2022-10-06 PROCEDURE — 98941 CHIROPRACT MANJ 3-4 REGIONS: CPT | Mod: AT | Performed by: CHIROPRACTOR
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
## 2022-10-07 NOTE — PATIENT INSTRUCTIONS
Return to office in 4 weeks for prenatal care and as needed.    Thank you for allowing Bob HARP CNM and our OB team to participate in your care.  If you have a scheduling or an appointment question please contact [facility] Unit Coordinator at their direct line [phone].   ALL nursing questions or concerns can be directed to your OB nurse at: [phone] Erin Nagy/Anne Marie       
37.3

## 2022-11-02 NOTE — PROGRESS NOTES
Assessment & Plan     Atypical pigmented skin lesion  Nevi growing in the scar  - Surgical Pathology Exam  - Single Lesion    Malignant melanoma of skin of right leg (H)    - Surgical Pathology Exam    History of melanoma    - Single Lesion    Provider  Link to [facility] Help Grid :[id number]}    Patient was agreeable to this plan and had no further questions.  Patient Instructions     Procedure: Punch biopsy  Diagnosis: history of melanoma and nevi growing in the scar  Location: right lower leg  The patient was informed of the nature of the procedure and the inherent risks of unattractive scarring, bleeding, and infection, and that further treatment may be required. She consents to the procedure.  The area surrounding the lesion was prepped with alcohol and anesthetized with 1% xylocaine with epinephrine. A 3 mm punch biopsy was obtained. The wound was closed using 4-0 ethilon with 1 sutures..  The wound was dressed with Bacitracin ointment and a bandage. Wound care was explained and a wound care instruction sheet given. Follow up with any wound problems, poor healing, or signs of infection.        No follow-ups on file.    Debby Floyd MD  [facility] - DOUGLAS Vaugnh is a 31 year old, presenting for the following health issues:  Mole      HPI     Skin-mole  Onset/Duration: ongoing  Description  Location: Right leg, side of calf  Color: brown and skin colored  Border description: flat  Character: linear  Itching: no  Bleeding:  No  Intensity:  moderate  Progression of Symptoms:  same  Accompanying signs and symptoms:   Bleeding: No  Scaling: No  Excessive sun exposure/tanning: No  Sunscreen used: YES  History:           Any previous history of skin cancer: YES  Any family history of melanoma: No  Previous episodes of similar lesion: YES- is growing on top of where it was removed  Precipitating or alleviating factors: none  Therapies tried and outcome: previous surgery/biopsy      Review  "of Systems   Constitutional, HEENT, cardiovascular, pulmonary, gi and gu systems are negative, except as otherwise noted.      Objective    /60   Pulse 90   Temp 98.5  F (36.9  C) (Tympanic)   Ht 1.702 m (5' 7\")   Wt 59.4 kg (131 lb)   LMP 10/12/2022   SpO2 99%   Breastfeeding No   BMI 20.52 kg/m    Body mass index is 20.52 kg/m .  Physical Exam   GENERAL: healthy, alert and no distress  MS: no gross musculoskeletal defects noted, no edema  SKIN: approx 6 in scar on right lateral lower leg and brown macule in the first 1.5 in within the healed scar  PSYCH: mentation appears normal, affect normal/bright    No results found for any visits on 11/03/22.              "

## 2022-11-03 ENCOUNTER — OFFICE VISIT (OUTPATIENT)
Dept: FAMILY MEDICINE | Facility: OTHER | Age: 32
End: 2022-11-03
Attending: FAMILY MEDICINE
Payer: COMMERCIAL

## 2022-11-03 VITALS
OXYGEN SATURATION: 99 % | WEIGHT: 131 LBS | HEART RATE: 90 BPM | TEMPERATURE: 98.5 F | DIASTOLIC BLOOD PRESSURE: 60 MMHG | HEIGHT: 67 IN | SYSTOLIC BLOOD PRESSURE: 110 MMHG | BODY MASS INDEX: 20.56 KG/M2

## 2022-11-03 DIAGNOSIS — L81.9 ATYPICAL PIGMENTED SKIN LESION: Primary | ICD-10-CM

## 2022-11-03 DIAGNOSIS — C43.71 MALIGNANT MELANOMA OF SKIN OF RIGHT LEG (H): ICD-10-CM

## 2022-11-03 DIAGNOSIS — Z85.820 HISTORY OF MELANOMA: ICD-10-CM

## 2022-11-03 PROCEDURE — 88312 SPECIAL STAINS GROUP 1: CPT | Mod: 26 | Performed by: PATHOLOGY

## 2022-11-03 PROCEDURE — 88342 IMHCHEM/IMCYTCHM 1ST ANTB: CPT | Mod: 26 | Performed by: PATHOLOGY

## 2022-11-03 PROCEDURE — 88305 TISSUE EXAM BY PATHOLOGIST: CPT | Mod: 26 | Performed by: PATHOLOGY

## 2022-11-03 PROCEDURE — 88305 TISSUE EXAM BY PATHOLOGIST: CPT | Mod: TC | Performed by: FAMILY MEDICINE

## 2022-11-03 PROCEDURE — 11104 PUNCH BX SKIN SINGLE LESION: CPT | Performed by: FAMILY MEDICINE

## 2022-11-03 ASSESSMENT — PAIN SCALES - GENERAL: PAINLEVEL: NO PAIN (0)

## 2022-11-03 NOTE — NURSING NOTE
"Chief Complaint   Patient presents with     Mole       Initial /60   Pulse 90   Temp 98.5  F (36.9  C) (Tympanic)   Ht 1.702 m (5' 7\")   Wt 59.4 kg (131 lb)   LMP 10/12/2022   SpO2 99%   Breastfeeding No   BMI 20.52 kg/m   Estimated body mass index is 20.52 kg/m  as calculated from the following:    Height as of this encounter: 1.702 m (5' 7\").    Weight as of this encounter: 59.4 kg (131 lb).  Medication Reconciliation: complete  Colleen Davis LPN  "

## 2022-11-03 NOTE — PATIENT INSTRUCTIONS
Procedure: Punch biopsy  Diagnosis: history of melanoma and nevi growing in the scar  Location: right lower leg  The patient was informed of the nature of the procedure and the inherent risks of unattractive scarring, bleeding, and infection, and that further treatment may be required. She consents to the procedure.  The area surrounding the lesion was prepped with alcohol and anesthetized with 1% xylocaine with epinephrine. A 3 mm punch biopsy was obtained. The wound was closed using 4-0 ethilon with 1 sutures..  The wound was dressed with Bacitracin ointment and a bandage. Wound care was explained and a wound care instruction sheet given. Follow up with any wound problems, poor healing, or signs of infection.

## 2022-11-14 LAB
PATH REPORT.COMMENTS IMP SPEC: NORMAL
PATH REPORT.FINAL DX SPEC: NORMAL
PATH REPORT.GROSS SPEC: NORMAL
PATH REPORT.MICROSCOPIC SPEC OTHER STN: NORMAL
PATH REPORT.RELEVANT HX SPEC: NORMAL
PHOTO IMAGE: NORMAL

## 2023-03-09 ENCOUNTER — OFFICE VISIT (OUTPATIENT)
Dept: CHIROPRACTIC MEDICINE | Facility: OTHER | Age: 33
End: 2023-03-09
Attending: CHIROPRACTOR
Payer: COMMERCIAL

## 2023-03-09 DIAGNOSIS — M99.03 SEGMENTAL AND SOMATIC DYSFUNCTION OF LUMBAR REGION: Primary | ICD-10-CM

## 2023-03-09 DIAGNOSIS — M99.02 SEGMENTAL AND SOMATIC DYSFUNCTION OF THORACIC REGION: ICD-10-CM

## 2023-03-09 DIAGNOSIS — M99.01 SEGMENTAL AND SOMATIC DYSFUNCTION OF CERVICAL REGION: ICD-10-CM

## 2023-03-09 DIAGNOSIS — M54.50 ACUTE BILATERAL LOW BACK PAIN WITHOUT SCIATICA: ICD-10-CM

## 2023-03-09 PROCEDURE — 98941 CHIROPRACT MANJ 3-4 REGIONS: CPT | Mod: AT | Performed by: CHIROPRACTOR
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
## 2023-03-23 ENCOUNTER — HOSPITAL ENCOUNTER (EMERGENCY)
Facility: HOSPITAL | Age: 33
Discharge: HOME | End: 2023-03-23
Attending: PHYSICIAN ASSISTANT | Admitting: PHYSICIAN ASSISTANT
Payer: COMMERCIAL

## 2023-03-23 VITALS
DIASTOLIC BLOOD PRESSURE: 68 MMHG | SYSTOLIC BLOOD PRESSURE: 107 MMHG | TEMPERATURE: 98 F | RESPIRATION RATE: 18 BRPM | OXYGEN SATURATION: 96 % | HEART RATE: 76 BPM

## 2023-03-23 DIAGNOSIS — J01.90 ACUTE NON-RECURRENT SINUSITIS, UNSPECIFIED LOCATION: ICD-10-CM

## 2023-03-23 LAB — GROUP A STREP BY PCR: NOT DETECTED

## 2023-03-23 PROCEDURE — 99283 EMERGENCY DEPT VISIT LOW MDM: CPT

## 2023-03-23 PROCEDURE — 87651 STREP A DNA AMP PROBE: CPT | Performed by: PHYSICIAN ASSISTANT

## 2023-03-23 PROCEDURE — 99283 EMERGENCY DEPT VISIT LOW MDM: CPT | Performed by: PHYSICIAN ASSISTANT

## 2023-03-23 RX ORDER — AMOXICILLIN 875 MG
875 TABLET ORAL 2 TIMES DAILY
Qty: 14 TABLET | Refills: 0 | Status: SHIPPED | OUTPATIENT
Start: 2023-03-23 | End: 2023-03-30

## 2023-03-24 NOTE — ED NOTES
Patient given verbal and written discharge instructions. Patient verbalized understanding of discharge instructions. Rx sent to [facility].

## 2023-03-24 NOTE — ED NOTES
Patient presents to emergency room with complaints of sore throat. Pt's daughter tested positive to strep throat today.

## 2023-03-24 NOTE — ED PROVIDER NOTES
History     Chief Complaint   Patient presents with     Pharyngitis     HPI  Johana Duff is a 32 year old female who presents to the  for evaluation of 2-3 weeks of nasal discharge, sinus pressure, fevers, chills.   Is concerned for sinus infection.  No ear pain, or pressure.  No lightheadedness or dizziness.  No nausea, vomiting, diarrhea.   Notes sore throat.  No sob, chest pain, diff. Breathing.      Allergies:  Allergies   Allergen Reactions     Blue Dyes      Red Dye        Problem List:    Patient Active Problem List    Diagnosis Date Noted     Menorrhagia with regular cycle 03/10/2021     Priority: Medium     Dysmenorrhea 03/10/2021     Priority: Medium     Chronic intractable headache, unspecified headache type 02/10/2021     Priority: Medium     2019 novel coronavirus disease (COVID-19) 02/10/2021     Priority: Medium     Malignant melanoma of skin of right leg (H) 2021     Priority: Medium     Vertigo 2021     Priority: Medium     Hypovitaminosis D 2021     Priority: Medium     Paresthesia 2021     Priority: Medium     Fatigue, unspecified type 2021     Priority: Medium     Hair loss 2021     Priority: Medium     Atypical nevi 10/10/2019     Priority: Medium     Threatened labor 2019     Priority: Medium      (spontaneous vaginal delivery) 2019     Priority: Medium     Ember Beverly  8 lb 5.5 oz  Small 2nd degree with repair       Insufficient endocervical or transformation zone component in cervical specimen 2018     Priority: Medium     Use cytobrush       Bilateral carpal tunnel syndrome 10/10/2017     Priority: Medium     Plantar fasciitis 2017     Priority: Medium     Cervicalgia 2017     Priority: Medium     Congenital pectus excavatum 2013     Priority: Medium        Past Medical History:    Past Medical History:   Diagnosis Date     Flexural eczema      Intractable migraine without aura and without status migrainosus       Lactose intolerance      Melanoma (H) 10/2019     Ovarian cyst 2002     Thoracogenic scoliosis of thoracolumbar region        Past Surgical History:    Past Surgical History:   Procedure Laterality Date     wisdom teeth extraction  2014       Family History:    Family History   Problem Relation Age of Onset     Cerebrovascular Disease Maternal Grandmother         complications     Hypertension Maternal Grandmother      Back Pain Mother 55        disc herniation     Coronary Artery Disease Maternal Grandfather         s/p stents x 3     Lung Cancer Maternal Grandfather         +tobacco     Vertigo Father         lymes     Lactose Intolerance Sister      Emphysema Paternal Grandmother         +tobacco     Eczema Sister      Lactose Intolerance Sister      Emphysema Paternal Grandfather         +tobacco       Social History:  Marital Status:   [2]  Social History     Tobacco Use     Smoking status: Never     Smokeless tobacco: Never   Substance Use Topics     Alcohol use: Yes     Comment: 3/year     Drug use: No        Medications:    amoxicillin (AMOXIL) 875 MG tablet  cholecalciferol (VITAMIN D3) 5000 units TABS tablet  Multiple Vitamins-Minerals (OPTI-MULTI PO)          Review of Systems   All other systems reviewed and are negative.      Physical Exam   BP: 107/68  Pulse: 76  Temp: 98  F (36.7  C)  Resp: 18  SpO2: 96 %      Physical Exam  Constitutional:       General: She is not in acute distress.     Appearance: Normal appearance. She is normal weight. She is not ill-appearing, toxic-appearing or diaphoretic.   HENT:      Head: Normocephalic and atraumatic.      Right Ear: External ear normal.      Left Ear: External ear normal.   Eyes:      Extraocular Movements: Extraocular movements intact.      Conjunctiva/sclera: Conjunctivae normal.      Pupils: Pupils are equal, round, and reactive to light.   Cardiovascular:      Rate and Rhythm: Normal rate.   Pulmonary:      Effort: Pulmonary effort is  normal. No respiratory distress.   Musculoskeletal:         General: Normal range of motion.   Skin:     General: Skin is warm and dry.      Coloration: Skin is not jaundiced or pale.   Neurological:      Mental Status: She is alert and oriented to person, place, and time. Mental status is at baseline.      Cranial Nerves: No cranial nerve deficit.   Psychiatric:         Mood and Affect: Mood normal.         ED Course       I have reviewed the epic chart, the nurses note and triage note. vital signs were reviewed.  Given duration of symptoms, and symptoms offered abx therapy.  She was agreeable.  Discussed symptomatic care and expected resolution.  All questions answered.  Pt discharged.            Procedures                  No results found for this or any previous visit (from the past 24 hour(s)).    Medications - No data to display    Assessments & Plan (with Medical Decision Making)     I have reviewed the nursing notes.    I have reviewed the findings, diagnosis, plan and need for follow up with the patient.                   Discharge Medication List as of 3/23/2023 10:36 PM      START taking these medications    Details   amoxicillin (AMOXIL) 875 MG tablet Take 1 tablet (875 mg) by mouth 2 times daily for 7 days, Disp-14 tablet, R-0, E-Prescribe             Final diagnoses:   Acute non-recurrent sinusitis, unspecified location       3/23/2023   [facility]     Terry Bentley PA-C  03/29/23 0916

## 2023-04-05 ENCOUNTER — OFFICE VISIT (OUTPATIENT)
Dept: CHIROPRACTIC MEDICINE | Facility: OTHER | Age: 33
End: 2023-04-05
Attending: CHIROPRACTOR
Payer: COMMERCIAL

## 2023-04-05 DIAGNOSIS — M54.2 CERVICALGIA: ICD-10-CM

## 2023-04-05 DIAGNOSIS — M99.01 SEGMENTAL AND SOMATIC DYSFUNCTION OF CERVICAL REGION: Primary | ICD-10-CM

## 2023-04-05 DIAGNOSIS — M99.02 SEGMENTAL AND SOMATIC DYSFUNCTION OF THORACIC REGION: ICD-10-CM

## 2023-04-05 DIAGNOSIS — M99.03 SEGMENTAL AND SOMATIC DYSFUNCTION OF LUMBAR REGION: ICD-10-CM

## 2023-04-05 PROCEDURE — 98941 CHIROPRACT MANJ 3-4 REGIONS: CPT | Mod: AT | Performed by: CHIROPRACTOR

## 2023-04-12 NOTE — PROGRESS NOTES
Subjective Finding:    Chief compalint: Patient presents with:  Back Pain: Sharpness in low back and stiffness in the neck region. Ongoing a few days now.  Stretches seem to be helping    , Pain Scale: 4/10, Intensity: sharp, Duration: 2 weeks, Radiating: no.    Date of injury:     Activities that the pain restricts:   Home/household/hobbies/social activities: yes.  Work duties: yes.  Sleep: yes.  Makes symptoms better: rest.  Makes symptoms worse: activity.  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 no.  Work related: no.  Automobile related injury: no.    Objective and Assessment:    Posture Analysis:   High shoulder: .  Head tilt: .  High iliac crest: .  Head carriage: forward.  Thoracic Kyphosis: neutral.  Lumbar Lordosis: forward.    Lumbar Range of Motion: extension decreased.  Cervical Range of Motion: extension decreased.  Thoracic Range of Motion: extension decreased.  Extremity Range of Motion: .    Palpation:   Lumbar tightness  subocc mm spasm    Segmental dysfunction pre-treatment and treatment area: C56  T5  L5.    Assessment post-treatment:  Cervical: ROM increased.  Thoracic: ROM increased.  Lumbar: ROM increased.    Comments: .      Complicating Factors: .    Procedure(s):  CMT:  13813 Chiropractic manipulative treatment 3-4 regions performed   Cervical: Diversified, See above for level, Supine, Thoracic: Diversified, See above for level, Prone and Lumbar: Diversified, See above for level, Side posture    Modalities:  None performed this visit    Therapeutic procedures:  None    Plan:  Treatment plan: as needed.  Instructed patient: stretch as instructed at visit.  Short term goals: reduce pain.  Long term goals: restore normal function.  Prognosis: excellent.

## 2023-05-01 ENCOUNTER — OFFICE VISIT (OUTPATIENT)
Dept: CHIROPRACTIC MEDICINE | Facility: OTHER | Age: 33
End: 2023-05-01
Payer: COMMERCIAL

## 2023-05-01 DIAGNOSIS — M54.2 CERVICALGIA: ICD-10-CM

## 2023-05-01 DIAGNOSIS — M99.02 SEGMENTAL AND SOMATIC DYSFUNCTION OF THORACIC REGION: ICD-10-CM

## 2023-05-01 DIAGNOSIS — M99.03 SEGMENTAL AND SOMATIC DYSFUNCTION OF LUMBAR REGION: ICD-10-CM

## 2023-05-01 DIAGNOSIS — M99.01 SEGMENTAL AND SOMATIC DYSFUNCTION OF CERVICAL REGION: Primary | ICD-10-CM

## 2023-05-01 PROCEDURE — 98941 CHIROPRACT MANJ 3-4 REGIONS: CPT | Mod: AT | Performed by: CHIROPRACTOR

## 2023-05-02 NOTE — PROGRESS NOTES
Subjective Finding:    Chief compalint: Patient presents with:  Back Pain: With some headaches as well .  Stiffness in cervical spine    , Pain Scale: 5/10, Intensity: sharp, Duration: 2 days, Radiating: no.    Date of injury:     Activities that the pain restricts:   Home/household/hobbies/social activities: yes.  Work duties: yes.  Sleep: yes.  Makes symptoms better: rest.  Makes symptoms worse: activity.  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 no.  Work related: no.  Automobile related injury: no.    Objective and Assessment:    Posture Analysis:   High shoulder: .  Head tilt: .  High iliac crest: .  Head carriage: forward.  Thoracic Kyphosis: neutral.  Lumbar Lordosis: forward.    Lumbar Range of Motion: extension decreased.  Cervical Range of Motion: extension decreased.  Thoracic Range of Motion: extension decreased.  Extremity Range of Motion: .    Palpation:   Lumbar tightness  subocc mm spasm    Segmental dysfunction pre-treatment and treatment area: C56  T5  L5.    Assessment post-treatment:  Cervical: ROM increased.  Thoracic: ROM increased.  Lumbar: ROM increased.    Comments: .      Complicating Factors: .    Procedure(s):  CMT:  29291 Chiropractic manipulative treatment 3-4 regions performed   Cervical: Diversified, See above for level, Supine, Thoracic: Diversified, See above for level, Prone and Lumbar: Diversified, See above for level, Side posture    Modalities:  None performed this visit    Therapeutic procedures:  None    Plan:  Treatment plan: as needed.  Instructed patient: stretch as instructed at visit.  Short term goals: reduce pain.  Long term goals: restore normal function.  Prognosis: excellent.

## 2023-05-11 ENCOUNTER — OFFICE VISIT (OUTPATIENT)
Dept: CHIROPRACTIC MEDICINE | Facility: OTHER | Age: 33
End: 2023-05-11
Attending: CHIROPRACTOR
Payer: COMMERCIAL

## 2023-05-11 DIAGNOSIS — M99.01 SEGMENTAL AND SOMATIC DYSFUNCTION OF CERVICAL REGION: Primary | ICD-10-CM

## 2023-05-11 DIAGNOSIS — M99.02 SEGMENTAL AND SOMATIC DYSFUNCTION OF THORACIC REGION: ICD-10-CM

## 2023-05-11 DIAGNOSIS — M54.2 CERVICALGIA: ICD-10-CM

## 2023-05-11 DIAGNOSIS — M99.03 SEGMENTAL AND SOMATIC DYSFUNCTION OF LUMBAR REGION: ICD-10-CM

## 2023-05-11 PROCEDURE — 98941 CHIROPRACT MANJ 3-4 REGIONS: CPT | Mod: AT | Performed by: CHIROPRACTOR

## 2023-05-11 NOTE — PROGRESS NOTES
Subjective Finding:    Chief compalint: Patient presents with:  Back Pain: With slight neck pain    , Pain Scale: 2/10, Intensity: sharp, Duration: 2 days, Radiating: no.    Date of injury:     Activities that the pain restricts:   Home/household/hobbies/social activities: yes.  Work duties: yes.  Sleep: yes.  Makes symptoms better: rest.  Makes symptoms worse: activity.  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 no.  Work related: no.  Automobile related injury: no.    Objective and Assessment:    Posture Analysis:   High shoulder: .  Head tilt: .  High iliac crest: .  Head carriage: forward.  Thoracic Kyphosis: neutral.  Lumbar Lordosis: forward.    Lumbar Range of Motion: extension decreased.  Cervical Range of Motion: extension decreased.  Thoracic Range of Motion: extension decreased.  Extremity Range of Motion: .    Palpation:   Lumbar tightness  subocc mm spasm    Segmental dysfunction pre-treatment and treatment area: C56  T5  L5.    Assessment post-treatment:  Cervical: ROM increased.  Thoracic: ROM increased.  Lumbar: ROM increased.    Comments: .      Complicating Factors: .    Procedure(s):  CMT:  20736 Chiropractic manipulative treatment 3-4 regions performed   Cervical: Diversified, See above for level, Supine, Thoracic: Diversified, See above for level, Prone and Lumbar: Diversified, See above for level, Side posture    Modalities:  None performed this visit    Therapeutic procedures:  None    Plan:  Treatment plan: as needed.  Instructed patient: stretch as instructed at visit.  Short term goals: reduce pain.  Long term goals: restore normal function.  Prognosis: excellent.

## 2023-06-08 NOTE — PROGRESS NOTES
Subjective Finding:    Chief compalint: Patient presents with:  Back Pain: with pregnancy  Neck Pain  , Pain Scale: 7/10, Intensity: sharp, Duration: 1 weeks, Radiating: no.    Date of injury:     Activities that the pain restricts:   Home/household/hobbies/social activities: yes.  Work duties: yes.  Sleep: yes.  Makes symptoms better: rest.  Makes symptoms worse: activity.  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 Yes: MD.  Work related: no.  Automobile related injury: no.    Objective and Assessment:    Posture Analysis:   High shoulder: .  Head tilt: .  High iliac crest: .  Head carriage: forward.  Thoracic Kyphosis: neutral.  Lumbar Lordosis: forward.    Lumbar Range of Motion: extension decreased.  Cervical Range of Motion: extension decreased.  Thoracic Range of Motion: extension decreased.  Extremity Range of Motion: .    Palpation:   Quad lumb: bilateral, referred pain: no  Lev scapulae: dull pain, referred pain: no    Segmental dysfunction pre-treatment and treatment area: C3, C4, T5, L4 and L5.    Assessment post-treatment:  Cervical: ROM increased.  Thoracic: ROM increased.  Lumbar: ROM increased.    Comments: .      Complicating Factors: .    Procedure(s):  CMT:  65293 Chiropractic manipulative treatment 3-4 regions performed   Cervical: Diversified, See above for level, Supine, Thoracic: Diversified, See above for level, Prone and Lumbar: Diversified, See above for level, Side posture    Modalities:  None performed this visit    Therapeutic procedures:  None    Plan:  Treatment plan: PRN.  Instructed patient: stretch as instructed at visit.  Short term goals: reduce pain.  Long term goals: restore normal function.  Prognosis: excellent.              Discharged

## 2023-07-12 ENCOUNTER — OFFICE VISIT (OUTPATIENT)
Dept: CHIROPRACTIC MEDICINE | Facility: OTHER | Age: 33
End: 2023-07-12
Attending: CHIROPRACTOR
Payer: COMMERCIAL

## 2023-07-12 DIAGNOSIS — M99.01 SEGMENTAL AND SOMATIC DYSFUNCTION OF CERVICAL REGION: Primary | ICD-10-CM

## 2023-07-12 DIAGNOSIS — M54.2 CERVICALGIA: ICD-10-CM

## 2023-07-12 DIAGNOSIS — M99.03 SEGMENTAL AND SOMATIC DYSFUNCTION OF LUMBAR REGION: ICD-10-CM

## 2023-07-12 DIAGNOSIS — M99.02 SEGMENTAL AND SOMATIC DYSFUNCTION OF THORACIC REGION: ICD-10-CM

## 2023-07-12 PROCEDURE — 98941 CHIROPRACT MANJ 3-4 REGIONS: CPT | Mod: AT | Performed by: CHIROPRACTOR

## 2023-07-17 NOTE — PROGRESS NOTES
Subjective Finding:    Chief compalint: Patient presents with:  Neck Pain: Neck pain and headaches    , Pain Scale: 2/10, Intensity: dull ache at times    , Duration: 4 days, Radiating: no.    Date of injury:     Activities that the pain restricts:   Home/household/hobbies/social activities: yes.  Work duties: yes.  Sleep: yes.  Makes symptoms better: rest.  Makes symptoms worse: activity.  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 no.  Work related: no.  Automobile related injury: no.    Objective and Assessment:    Posture Analysis:   High shoulder: .  Head tilt: .  High iliac crest: .  Head carriage: forward.  Thoracic Kyphosis: neutral.  Lumbar Lordosis: forward.    Lumbar Range of Motion: extension decreased.  Cervical Range of Motion: extension decreased.  Thoracic Range of Motion: extension decreased.  Extremity Range of Motion: .    Palpation:   Lumbar tightness  subocc mm spasm    Segmental dysfunction pre-treatment and treatment area: C56  T5  L5.    Assessment post-treatment:  Cervical: ROM increased.  Thoracic: ROM increased.  Lumbar: ROM increased.    Comments: .      Complicating Factors: .    Procedure(s):  CMT:  12706 Chiropractic manipulative treatment 3-4 regions performed   Cervical: Diversified, See above for level, Supine, Thoracic: Diversified, See above for level, Prone and Lumbar: Diversified, See above for level, Side posture    Modalities:  None performed this visit    Therapeutic procedures:  None    Plan:  Treatment plan: as needed.  Instructed patient: stretch as instructed at visit.  Short term goals: reduce pain.  Long term goals: restore normal function.  Prognosis: excellent.

## 2023-07-23 ENCOUNTER — HEALTH MAINTENANCE LETTER (OUTPATIENT)
Age: 33
End: 2023-07-23

## 2023-08-16 ENCOUNTER — OFFICE VISIT (OUTPATIENT)
Dept: CHIROPRACTIC MEDICINE | Facility: OTHER | Age: 33
End: 2023-08-16
Attending: CHIROPRACTOR
Payer: COMMERCIAL

## 2023-08-16 DIAGNOSIS — M99.02 SEGMENTAL AND SOMATIC DYSFUNCTION OF THORACIC REGION: ICD-10-CM

## 2023-08-16 DIAGNOSIS — M99.03 SEGMENTAL AND SOMATIC DYSFUNCTION OF LUMBAR REGION: ICD-10-CM

## 2023-08-16 DIAGNOSIS — M54.2 CERVICALGIA: ICD-10-CM

## 2023-08-16 DIAGNOSIS — M99.01 SEGMENTAL AND SOMATIC DYSFUNCTION OF CERVICAL REGION: Primary | ICD-10-CM

## 2023-08-16 PROCEDURE — 98941 CHIROPRACT MANJ 3-4 REGIONS: CPT | Mod: AT | Performed by: CHIROPRACTOR

## 2023-08-23 NOTE — PROGRESS NOTES
Subjective Finding:    Chief compalint: Patient presents with:  Back Pain: Low back stiffness and neck pain.  Ongoing a few days now    , Pain Scale: 4/10, Intensity: dull ache at times    , Duration: 3 days, Radiating: no.    Date of injury:     Activities that the pain restricts:   Home/household/hobbies/social activities: yes.  Work duties: yes.  Sleep: yes.  Makes symptoms better: rest.  Makes symptoms worse: activity.  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 no.  Work related: no.  Automobile related injury: no.    Objective and Assessment:    Posture Analysis:   High shoulder: .  Head tilt: .  High iliac crest: .  Head carriage: forward.  Thoracic Kyphosis: neutral.  Lumbar Lordosis: forward.    Lumbar Range of Motion: extension decreased.  Cervical Range of Motion: extension decreased.  Thoracic Range of Motion: extension decreased.  Extremity Range of Motion: .    Palpation:   Lumbar tightness  subocc mm spasm    Segmental dysfunction pre-treatment and treatment area: C56  T5  L5.    Assessment post-treatment:  Cervical: ROM increased.  Thoracic: ROM increased.  Lumbar: ROM increased.    Comments: .      Complicating Factors: .    Procedure(s):  CMT:  98566 Chiropractic manipulative treatment 3-4 regions performed   Cervical: Diversified, See above for level, Supine, Thoracic: Diversified, See above for level, Prone and Lumbar: Diversified, See above for level, Side posture    Modalities:  None performed this visit    Therapeutic procedures:  None    Plan:  Treatment plan: as needed.  Instructed patient: stretch as instructed at visit.  Short term goals: reduce pain.  Long term goals: restore normal function.  Prognosis: excellent.

## 2023-09-14 ENCOUNTER — OFFICE VISIT (OUTPATIENT)
Dept: CHIROPRACTIC MEDICINE | Facility: OTHER | Age: 33
End: 2023-09-14
Attending: CHIROPRACTOR
Payer: COMMERCIAL

## 2023-09-14 DIAGNOSIS — M99.03 SEGMENTAL AND SOMATIC DYSFUNCTION OF LUMBAR REGION: Primary | ICD-10-CM

## 2023-09-14 DIAGNOSIS — M99.02 SEGMENTAL AND SOMATIC DYSFUNCTION OF THORACIC REGION: ICD-10-CM

## 2023-09-14 DIAGNOSIS — M54.50 ACUTE BILATERAL LOW BACK PAIN WITHOUT SCIATICA: ICD-10-CM

## 2023-09-14 DIAGNOSIS — M99.01 SEGMENTAL AND SOMATIC DYSFUNCTION OF CERVICAL REGION: ICD-10-CM

## 2023-09-14 PROCEDURE — 98941 CHIROPRACT MANJ 3-4 REGIONS: CPT | Mod: AT | Performed by: CHIROPRACTOR

## 2023-09-18 NOTE — PROGRESS NOTES
Subjective Finding:    Chief compalint: Patient presents with:  Neck Pain: Tightness in neck and low back.  Low back pain started with working on the garage and lifted a heavy board  , Pain Scale: 4/10, Intensity: dull ache at times    , Duration: 2 days, Radiating: no.    Date of injury:     Activities that the pain restricts:   Home/household/hobbies/social activities: yes.  Work duties: yes.  Sleep: yes.  Makes symptoms better: rest.  Makes symptoms worse: activity.  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 no.  Work related: no.  Automobile related injury: no.    Objective and Assessment:    Posture Analysis:   High shoulder: .  Head tilt: .  High iliac crest: .  Head carriage: forward.  Thoracic Kyphosis: neutral.  Lumbar Lordosis: forward.    Lumbar Range of Motion: extension decreased.  Cervical Range of Motion: extension decreased.  Thoracic Range of Motion: extension decreased.  Extremity Range of Motion: .    Palpation:   Lumbar tightness  subocc mm spasm    Segmental dysfunction pre-treatment and treatment area: C56  T5  L5.    Assessment post-treatment:  Cervical: ROM increased.  Thoracic: ROM increased.  Lumbar: ROM increased.    Comments: .      Complicating Factors: .    Procedure(s):  CMT:  86027 Chiropractic manipulative treatment 3-4 regions performed   Cervical: Diversified, See above for level, Supine, Thoracic: Diversified, See above for level, Prone and Lumbar: Diversified, See above for level, Side posture    Modalities:  None performed this visit    Therapeutic procedures:  None    Plan:  Treatment plan: as needed.  Instructed patient: stretch as instructed at visit.  Short term goals: reduce pain.  Long term goals: restore normal function.  Prognosis: excellent.

## 2023-10-03 ENCOUNTER — OFFICE VISIT (OUTPATIENT)
Dept: CHIROPRACTIC MEDICINE | Facility: OTHER | Age: 33
End: 2023-10-03
Attending: CHIROPRACTOR
Payer: COMMERCIAL

## 2023-10-03 DIAGNOSIS — M99.01 SEGMENTAL AND SOMATIC DYSFUNCTION OF CERVICAL REGION: ICD-10-CM

## 2023-10-03 DIAGNOSIS — M99.02 SEGMENTAL AND SOMATIC DYSFUNCTION OF THORACIC REGION: ICD-10-CM

## 2023-10-03 DIAGNOSIS — M99.03 SEGMENTAL AND SOMATIC DYSFUNCTION OF LUMBAR REGION: Primary | ICD-10-CM

## 2023-10-03 DIAGNOSIS — M54.50 ACUTE BILATERAL LOW BACK PAIN WITHOUT SCIATICA: ICD-10-CM

## 2023-10-03 PROCEDURE — 98941 CHIROPRACT MANJ 3-4 REGIONS: CPT | Mod: AT | Performed by: CHIROPRACTOR

## 2023-10-04 NOTE — PROGRESS NOTES
Subjective Finding:    Chief compalint: Patient presents with:  Back Pain: Tightness in lower back  , Pain Scale: 4/10, Intensity: dull ache at times    , Duration: 5 days, Radiating: no.    Date of injury:     Activities that the pain restricts:   Home/household/hobbies/social activities: yes.  Work duties: yes.  Sleep: yes.  Makes symptoms better: rest.  Makes symptoms worse: activity.  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 no.  Work related: no.  Automobile related injury: no.    Objective and Assessment:    Posture Analysis:   High shoulder: .  Head tilt: .  High iliac crest: .  Head carriage: forward.  Thoracic Kyphosis: neutral.  Lumbar Lordosis: forward.    Lumbar Range of Motion: extension decreased.  Cervical Range of Motion: extension decreased.  Thoracic Range of Motion: extension decreased.  Extremity Range of Motion: .    Palpation:   Lumbar tightness  subocc mm spasm    Segmental dysfunction pre-treatment and treatment area: C56  T5  L5.    Assessment post-treatment:  Cervical: ROM increased.  Thoracic: ROM increased.  Lumbar: ROM increased.    Comments: .      Complicating Factors: .    Procedure(s):  CMT:  75328 Chiropractic manipulative treatment 3-4 regions performed   Cervical: Diversified, See above for level, Supine, Thoracic: Diversified, See above for level, Prone and Lumbar: Diversified, See above for level, Side posture    Modalities:  None performed this visit    Therapeutic procedures:  None    Plan:  Treatment plan: as needed.  Instructed patient: stretch as instructed at visit.  Short term goals: reduce pain.  Long term goals: restore normal function.  Prognosis: excellent.

## 2023-10-10 ENCOUNTER — OFFICE VISIT (OUTPATIENT)
Dept: CHIROPRACTIC MEDICINE | Facility: OTHER | Age: 33
End: 2023-10-10
Attending: CHIROPRACTOR
Payer: COMMERCIAL

## 2023-10-10 DIAGNOSIS — M54.50 ACUTE BILATERAL LOW BACK PAIN WITHOUT SCIATICA: ICD-10-CM

## 2023-10-10 DIAGNOSIS — M99.02 SEGMENTAL AND SOMATIC DYSFUNCTION OF THORACIC REGION: ICD-10-CM

## 2023-10-10 DIAGNOSIS — M99.03 SEGMENTAL AND SOMATIC DYSFUNCTION OF LUMBAR REGION: Primary | ICD-10-CM

## 2023-10-10 DIAGNOSIS — M99.01 SEGMENTAL AND SOMATIC DYSFUNCTION OF CERVICAL REGION: ICD-10-CM

## 2023-10-10 PROCEDURE — 98941 CHIROPRACT MANJ 3-4 REGIONS: CPT | Mod: AT | Performed by: CHIROPRACTOR

## 2023-10-12 NOTE — PROGRESS NOTES
Subjective Finding:    Chief compalint: , Pain Scale: 2/10, Intensity: dull ache at times    , Duration: 1 days, Radiating: no.    Date of injury:     Activities that the pain restricts:   Home/household/hobbies/social activities: no.  Work duties: no.  Sleep: no.  Makes symptoms better: rest.  Makes symptoms worse: activity.  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 no.  Work related: no.  Automobile related injury: no.    Objective and Assessment:    Posture Analysis:   High shoulder: .  Head tilt: .  High iliac crest: .  Head carriage: forward.  Thoracic Kyphosis: neutral.  Lumbar Lordosis: forward.    Lumbar Range of Motion: extension decreased.  Cervical Range of Motion: extension decreased.  Thoracic Range of Motion: extension decreased.  Extremity Range of Motion: .    Palpation:   Lumbar tightness      Segmental dysfunction pre-treatment and treatment area: C2  T5  L45.    Assessment post-treatment:  Cervical: ROM increased.  Thoracic: ROM increased.  Lumbar: ROM increased.    Comments: .      Complicating Factors: .    Procedure(s):  CMT:  55947 Chiropractic manipulative treatment 3-4 regions performed   Cervical: Diversified, See above for level, Supine, Thoracic: Diversified, See above for level, Prone and Lumbar: Diversified, See above for level, Side posture    Modalities:  None performed this visit    Therapeutic procedures:  None    Plan:  Treatment plan: as needed.  Instructed patient: stretch as instructed at visit.  Short term goals: reduce pain.  Long term goals: restore normal function.  Prognosis: excellent.

## 2024-04-25 ENCOUNTER — HOSPITAL ENCOUNTER (EMERGENCY)
Facility: HOSPITAL | Age: 34
Discharge: HOME | End: 2024-04-25
Attending: PHYSICIAN ASSISTANT | Admitting: PHYSICIAN ASSISTANT
Payer: COMMERCIAL

## 2024-04-25 VITALS
RESPIRATION RATE: 18 BRPM | DIASTOLIC BLOOD PRESSURE: 74 MMHG | HEART RATE: 65 BPM | SYSTOLIC BLOOD PRESSURE: 118 MMHG | TEMPERATURE: 98.4 F | OXYGEN SATURATION: 100 %

## 2024-04-25 DIAGNOSIS — L50.9 HIVES: ICD-10-CM

## 2024-04-25 PROCEDURE — 99283 EMERGENCY DEPT VISIT LOW MDM: CPT

## 2024-04-25 PROCEDURE — 99283 EMERGENCY DEPT VISIT LOW MDM: CPT | Performed by: PHYSICIAN ASSISTANT

## 2024-04-25 RX ORDER — PREDNISONE 20 MG/1
TABLET ORAL
Qty: 10 TABLET | Refills: 0 | Status: SHIPPED | OUTPATIENT
Start: 2024-04-25 | End: 2024-05-09

## 2024-04-25 ASSESSMENT — ENCOUNTER SYMPTOMS
SHORTNESS OF BREATH: 0
BACK PAIN: 0
CHEST TIGHTNESS: 0
CHOKING: 0
NAUSEA: 0
ROS SKIN COMMENTS: SEE HPI
FATIGUE: 0
VOMITING: 0
FEVER: 0
ABDOMINAL PAIN: 0
DIAPHORESIS: 0

## 2024-04-25 ASSESSMENT — COLUMBIA-SUICIDE SEVERITY RATING SCALE - C-SSRS
6. HAVE YOU EVER DONE ANYTHING, STARTED TO DO ANYTHING, OR PREPARED TO DO ANYTHING TO END YOUR LIFE?: NO
2. HAVE YOU ACTUALLY HAD ANY THOUGHTS OF KILLING YOURSELF IN THE PAST MONTH?: NO
1. IN THE PAST MONTH, HAVE YOU WISHED YOU WERE DEAD OR WISHED YOU COULD GO TO SLEEP AND NOT WAKE UP?: NO

## 2024-04-25 NOTE — ED TRIAGE NOTES
Reports 3 days of hives, waxing and waning in severity, as well as lip, hand, and foot swelling and pain. Reports severe itchiness. Reports she took 50 mg of benadryl and ibuprofen at 1500 and is improving since then. Started after using a blue toothpaste. Never had anaphylaxis. No SOB, tongue or throat swelling, N/V, CP, etc.

## 2024-04-25 NOTE — ED NOTES
ABBEY Lim CNP assessed patient in triage and determined patient not Urgent Care appropriate. Will be seen in Emergency Department.

## 2024-04-26 NOTE — ED NOTES
Reports all symptoms improving. Skin less flushed than on arrival. Reports she took a second dose of benadryl while in the waiting room.

## 2024-04-26 NOTE — ED PROVIDER NOTES
History     Chief Complaint   Patient presents with    Allergic Reaction     The history is provided by the patient.     Johana Duff is a 33 year old female who presented to the emergency department for evaluation of urticaria and lip swelling.  The patient endorses that she has the symptoms intermittently throughout the last several months.  She reports that she believes that she is allergic to blue or red dye.  She denies any shortness of breath.  She took Benadryl prior to arrival as well as another dose of Benadryl while waiting.  Her symptoms are significantly improving.  She has no shortness of breath or chest pain.  No fevers.  No difficulty swallowing or speaking.  She shows me several pictures of her previous urticaria as well as urticaria from last night.  This appears to have nearly completely resolved.    Allergies:  Allergies   Allergen Reactions    Blue Dyes (Parenteral)     Red Dye        Problem List:    Patient Active Problem List    Diagnosis Date Noted    Menorrhagia with regular cycle 03/10/2021     Priority: Medium    Dysmenorrhea 03/10/2021     Priority: Medium    Chronic intractable headache, unspecified headache type 02/10/2021     Priority: Medium    2019 novel coronavirus disease (COVID-19) 02/10/2021     Priority: Medium    Malignant melanoma of skin of right leg (H) 2021     Priority: Medium    Vertigo 2021     Priority: Medium    Hypovitaminosis D 2021     Priority: Medium    Paresthesia 2021     Priority: Medium    Fatigue, unspecified type 2021     Priority: Medium    Hair loss 2021     Priority: Medium    Atypical nevi 10/10/2019     Priority: Medium    Threatened labor 2019     Priority: Medium     (spontaneous vaginal delivery) 2019     Priority: Medium     Ember Beverly  8 lb 5.5 oz  Small 2nd degree with repair      Insufficient endocervical or transformation zone component in cervical specimen 2018     Priority: Medium      Use cytobrush      Bilateral carpal tunnel syndrome 10/10/2017     Priority: Medium    Plantar fasciitis 09/28/2017     Priority: Medium    Cervicalgia 09/28/2017     Priority: Medium    Congenital pectus excavatum 09/16/2013     Priority: Medium        Past Medical History:    Past Medical History:   Diagnosis Date    Flexural eczema     Intractable migraine without aura and without status migrainosus     Lactose intolerance     Melanoma (H) 10/2019    Ovarian cyst 2002    Thoracogenic scoliosis of thoracolumbar region        Past Surgical History:    Past Surgical History:   Procedure Laterality Date    wisdom teeth extraction  2014       Family History:    Family History   Problem Relation Age of Onset    Cerebrovascular Disease Maternal Grandmother         complications    Hypertension Maternal Grandmother     Back Pain Mother 55        disc herniation    Coronary Artery Disease Maternal Grandfather         s/p stents x 3    Lung Cancer Maternal Grandfather         +tobacco    Vertigo Father         lymes    Lactose Intolerance Sister     Emphysema Paternal Grandmother         +tobacco    Eczema Sister     Lactose Intolerance Sister     Emphysema Paternal Grandfather         +tobacco       Social History:  Marital Status:   [2]  Social History     Tobacco Use    Smoking status: Never    Smokeless tobacco: Never   Substance Use Topics    Alcohol use: Yes     Comment: 3/year    Drug use: No        Medications:    predniSONE (DELTASONE) 20 MG tablet  cholecalciferol (VITAMIN D3) 5000 units TABS tablet  Multiple Vitamins-Minerals (OPTI-MULTI PO)          Review of Systems   Constitutional:  Negative for diaphoresis, fatigue and fever.   Respiratory:  Negative for choking, chest tightness and shortness of breath.    Cardiovascular:  Negative for chest pain.   Gastrointestinal:  Negative for abdominal pain, nausea and vomiting.   Musculoskeletal:  Negative for back pain.   Skin:         See HPI        Physical Exam   BP: 128/75  Pulse: 78  Temp: 98.4  F (36.9  C)  Resp: 18  SpO2: 100 %      Physical Exam  Vitals and nursing note reviewed.   Constitutional:       General: She is not in acute distress.     Appearance: Normal appearance. She is normal weight. She is not ill-appearing, toxic-appearing or diaphoretic.   HENT:      Mouth/Throat:      Comments: No significant swelling is noted to the patient's lips, tongue, or mouth.  She has normal speech.  Cardiovascular:      Rate and Rhythm: Normal rate and regular rhythm.   Pulmonary:      Effort: Pulmonary effort is normal.      Breath sounds: Normal breath sounds.   Skin:     General: Skin is warm and dry.      Capillary Refill: Capillary refill takes less than 2 seconds.      Comments: No significant urticaria is noted.   Neurological:      General: No focal deficit present.      Mental Status: She is alert and oriented to person, place, and time.         ED Course        Procedures              Critical Care time:  none               No results found for this or any previous visit (from the past 24 hour(s)).    Medications - No data to display    Assessments & Plan (with Medical Decision Making)   33-year-old female with what sounds to be a subacute history of intermittent urticaria and allergic reaction consisting of lip swelling.  Symptoms significantly improved with Benadryl.  On arrival to the emergency department she had already taken Benadryl.  She was in the waiting room for several hours and her symptoms persistently improved.  During my interview and examination her symptoms have nearly resolved.  Discussed further treatment including steroids and the patient would like to simply get the prescription and use it as needed.  This is reasonable.  She has no high risk or red flag features.  However we did discuss importance of close clinic follow-up for likely allergy testing etc. strict return precautions were provided.  She was quite happy and  agreeable.    Ms. Duff has also agreed to schedule a follow up appointment with her primary provider for re-evaluation and further management. She verbalized an understanding of my exam and our discussion and agrees with the complete discharge plan including instructions for return and follow up.  There is no indication for further investigation or treatment on an emergent basis.  There is no reasonably foreseeable injury that would be associated with discharge and close outpatient follow-up.      This document was prepared using a combination of typing and voice generated software.  While every attempt was made for accuracy, spelling and grammatical errors may exist.     I have reviewed the nursing notes.    I have reviewed the findings, diagnosis, plan and need for follow up with the patient.           Medical Decision Making  The patient's presentation was of moderate complexity (an undiagnosed new problem with uncertain diagnosis).    The patient's evaluation involved:  history and exam without other MDM data elements    The patient's management necessitated moderate risk (prescription drug management including medications given in the ED).        New Prescriptions    PREDNISONE (DELTASONE) 20 MG TABLET    Take two tablets (= 40mg) each day for 5 (five) days       Final diagnoses:   Hives       4/25/2024   [facility]       Nora Smith PA-C  04/25/24 1930

## 2024-04-26 NOTE — DISCHARGE INSTRUCTIONS
As we discussed, the prednisone can be used as needed.  It does not need to be taken for 5 straight days.  However, it is most useful when taken during the symptoms of the hives.  The medication typically takes 3 to 4 hours to start working but will work for 24 hours.  Please follow-up in the clinic for further evaluation.  Please return here for any new or worsening symptoms or other questions or concerns.

## 2024-05-09 ENCOUNTER — OFFICE VISIT (OUTPATIENT)
Dept: FAMILY MEDICINE | Facility: OTHER | Age: 34
End: 2024-05-09
Attending: FAMILY MEDICINE
Payer: COMMERCIAL

## 2024-05-09 VITALS
HEIGHT: 67 IN | SYSTOLIC BLOOD PRESSURE: 106 MMHG | BODY MASS INDEX: 22 KG/M2 | WEIGHT: 140.2 LBS | HEART RATE: 82 BPM | DIASTOLIC BLOOD PRESSURE: 68 MMHG | TEMPERATURE: 97.8 F | RESPIRATION RATE: 16 BRPM | OXYGEN SATURATION: 100 %

## 2024-05-09 DIAGNOSIS — L50.9 HIVES: Primary | ICD-10-CM

## 2024-05-09 DIAGNOSIS — L50.9 URTICARIA: ICD-10-CM

## 2024-05-09 PROCEDURE — 99214 OFFICE O/P EST MOD 30 MIN: CPT | Performed by: FAMILY MEDICINE

## 2024-05-09 RX ORDER — EPINEPHRINE 0.3 MG/.3ML
0.3 INJECTION SUBCUTANEOUS PRN
Qty: 2 EACH | Refills: 1 | Status: SHIPPED | OUTPATIENT
Start: 2024-05-09 | End: 2024-05-13

## 2024-05-09 ASSESSMENT — PAIN SCALES - GENERAL: PAINLEVEL: NO PAIN (0)

## 2024-05-09 NOTE — PROGRESS NOTES
Assessment & Plan     Hives  Unknown etiology  Will start with skin testing, she has epi pen  - Adult ENT  Referral; Future    Urticaria  As above  - Adult ENT  Referral; Future    Patient was agreeable to this plan and had no further questions.  There are no Patient Instructions on file for this visit.    No follow-ups on file.    Priya Vaughn is a 33 year old, presenting for the following health issues:  Hives        5/9/2024     9:42 AM   Additional Questions   Roomed by April   Accompanied by daughters         5/9/2024     9:42 AM   Patient Reported Additional Medications   Patient reports taking the following new medications none     History of Present Illness       Reason for visit:  Hive reaction and allergies    She eats 2-3 servings of fruits and vegetables daily.She consumes 1 sweetened beverage(s) daily.She exercises with enough effort to increase her heart rate 9 or less minutes per day.  She exercises with enough effort to increase her heart rate 3 or less days per week.   She is taking medications regularly.       Rash  Onset/Duration: 2 weeks   Description  Location: whole body   Character: blotchy, red  Itching: severe  Intensity:  severe  Progression of Symptoms:  improving  Accompanying signs and symptoms:   Fever: No  Body aches or joint pain: YES  Sore throat symptoms: No  Recent cold symptoms: No  History:           Previous episodes of similar rash: Bi monthly   New exposures:  foods  and grasses  Recent travel: No  Exposure to similar rash: YES  Precipitating or alleviating factors: benadryl   Therapies tried and outcome: Benadryl/diphenhydramine -  effective      Review of Systems  Constitutional, neuro, ENT, endocrine, pulmonary, cardiac, gastrointestinal, genitourinary, musculoskeletal, integument and psychiatric systems are negative, except as otherwise noted.      Objective    /68 (BP Location: Right arm, Patient Position: Sitting, Cuff Size: Adult  "Regular)   Pulse 82   Temp 97.8  F (36.6  C) (Tympanic)   Resp 16   Ht 1.702 m (5' 7\")   Wt 63.6 kg (140 lb 3.2 oz)   LMP 04/20/2024   SpO2 100%   BMI 21.96 kg/m    Body mass index is 21.96 kg/m .  Physical Exam   GENERAL: alert and no distress  RESP: lungs clear to auscultation - no rales, rhonchi or wheezes  CV: regular rate and rhythm, normal S1 S2, no S3 or S4, no murmur, click or rub, no peripheral edema  MS: no gross musculoskeletal defects noted, no edema  PSYCH: mentation appears normal, affect normal/bright    No results found for any visits on 05/09/24.        Signed Electronically by: Debby Floyd MD    "

## 2024-05-13 DIAGNOSIS — L50.9 URTICARIA: ICD-10-CM

## 2024-05-13 DIAGNOSIS — L50.9 HIVES: ICD-10-CM

## 2024-05-13 NOTE — TELEPHONE ENCOUNTER
Fax received from [employer] pt's Epinephrine 0.3mg inj 2 pack is not covered by the pt's insurance. They will cover Epipen, Auviq and Epinephrine. Please send formulary alternative or PA will need to be completed.

## 2024-05-15 RX ORDER — EPINEPHRINE 0.3 MG/.3ML
0.3 INJECTION SUBCUTANEOUS PRN
Qty: 2 EACH | Refills: 1 | Status: SHIPPED | OUTPATIENT
Start: 2024-05-15 | End: 2024-06-03

## 2024-05-16 ENCOUNTER — TELEPHONE (OUTPATIENT)
Dept: FAMILY MEDICINE | Facility: OTHER | Age: 34
End: 2024-05-16

## 2024-05-16 NOTE — TELEPHONE ENCOUNTER
Received PA request from [employer] for EPINEPHrine (ANY BX GENERIC EQUIV) 0.3 MG/0.3ML injection 2-pack. Submitted on [facility], APPROVED. Dates 04/16/24-05/16/25  Will scan into EPIC

## 2024-06-03 DIAGNOSIS — L50.9 HIVES: ICD-10-CM

## 2024-06-03 DIAGNOSIS — L50.9 URTICARIA: ICD-10-CM

## 2024-06-03 NOTE — TELEPHONE ENCOUNTER
Fax received from [facility] [employer] Epipen is not available from , they are unable to fill with generic as Rx is marked EFRAIN. Please send new Rx without EFRAIN listed.

## 2024-06-05 RX ORDER — EPINEPHRINE 0.3 MG/.3ML
0.3 INJECTION SUBCUTANEOUS PRN
Qty: 2 EACH | Refills: 1 | Status: SHIPPED | OUTPATIENT
Start: 2024-06-05

## 2024-06-19 ENCOUNTER — OFFICE VISIT (OUTPATIENT)
Dept: CHIROPRACTIC MEDICINE | Facility: OTHER | Age: 34
End: 2024-06-19
Attending: CHIROPRACTOR
Payer: COMMERCIAL

## 2024-06-19 DIAGNOSIS — M99.01 SEGMENTAL AND SOMATIC DYSFUNCTION OF CERVICAL REGION: ICD-10-CM

## 2024-06-19 DIAGNOSIS — M54.50 ACUTE BILATERAL LOW BACK PAIN WITHOUT SCIATICA: ICD-10-CM

## 2024-06-19 DIAGNOSIS — M99.03 SEGMENTAL AND SOMATIC DYSFUNCTION OF LUMBAR REGION: Primary | ICD-10-CM

## 2024-06-19 DIAGNOSIS — M99.02 SEGMENTAL AND SOMATIC DYSFUNCTION OF THORACIC REGION: ICD-10-CM

## 2024-06-19 PROCEDURE — 98941 CHIROPRACT MANJ 3-4 REGIONS: CPT | Mod: AT | Performed by: CHIROPRACTOR

## 2024-06-20 NOTE — PROGRESS NOTES
Subjective Finding:    Chief compalint: Patient presents with:  Back Pain: Stiffness   , Pain Scale: 4/10, Intensity: dull and ache, Duration: 1 days, Radiating: bilateral buttock.    Date of injury:     Activities that the pain restricts:   Home/household/hobbies/social activities: Yes.  Work duties: No.  Sleep: No.  Makes symptoms better: rest.  Makes symptoms worse: lumbar extension and lumbar flexion.  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 No.  Work related: No.  Automobile related injury: No.    Objective and Assessment:    Posture Analysis:   High shoulder: .  Head tilt: .  High iliac crest: .  Head carriage: neutral.  Thoracic Kyphosis: neutral.  Lumbar Lordosis: forward.    Lumbar Range of Motion: extension decreased.  Cervical Range of Motion: left rotation decreased and right rotation decreased.  Thoracic Range of Motion: .  Extremity Range of Motion: .    Palpation:   Quad lumb: bilateral, referred pain: no    Segmental dysfunction pre-treatment and treatment area: C3, C4, T4, and L5.    Assessment post-treatment:  Cervical: ROM increased.  Thoracic: ROM increased.  Lumbar: ROM increased.    Comments: .      Complicating Factors: .    Procedure(s):  [facility]:  27343 Chiropractic manipulative treatment 3-4 regions performed   Cervical: Diversified, See above for level, Supine, Thoracic: Diversified, See above for level, Prone, and Lumbar: Diversified, See above for level, Side posture    Modalities:  None performed this visit    Therapeutic procedures:  None    Plan:  Treatment plan: PRN.  Instructed patient: stretch as instructed at visit.  Short term goals: reduce pain.  Long term goals: increase ADL.  Prognosis: excellent.

## 2024-07-08 ENCOUNTER — OFFICE VISIT (OUTPATIENT)
Dept: OTOLARYNGOLOGY | Facility: OTHER | Age: 34
End: 2024-07-08
Attending: FAMILY MEDICINE
Payer: COMMERCIAL

## 2024-07-08 VITALS
SYSTOLIC BLOOD PRESSURE: 105 MMHG | HEART RATE: 72 BPM | HEIGHT: 67 IN | TEMPERATURE: 97.1 F | OXYGEN SATURATION: 99 % | DIASTOLIC BLOOD PRESSURE: 70 MMHG | WEIGHT: 140.21 LBS | RESPIRATION RATE: 18 BRPM | BODY MASS INDEX: 22.01 KG/M2

## 2024-07-08 DIAGNOSIS — L50.9 HIVES: ICD-10-CM

## 2024-07-08 DIAGNOSIS — L50.9 URTICARIA: Primary | ICD-10-CM

## 2024-07-08 DIAGNOSIS — J30.89 ENVIRONMENTAL AND SEASONAL ALLERGIES: ICD-10-CM

## 2024-07-08 DIAGNOSIS — Z91.02 ALLERGY TO FOOD DYE: ICD-10-CM

## 2024-07-08 DIAGNOSIS — H90.3 SENSORINEURAL HEARING LOSS (SNHL) OF BOTH EARS: ICD-10-CM

## 2024-07-08 PROCEDURE — 82785 ASSAY OF IGE: CPT | Mod: 90 | Performed by: NURSE PRACTITIONER

## 2024-07-08 PROCEDURE — 99213 OFFICE O/P EST LOW 20 MIN: CPT | Performed by: NURSE PRACTITIONER

## 2024-07-08 PROCEDURE — 86003 ALLG SPEC IGE CRUDE XTRC EA: CPT | Mod: 90 | Performed by: NURSE PRACTITIONER

## 2024-07-08 PROCEDURE — 36415 COLL VENOUS BLD VENIPUNCTURE: CPT | Performed by: NURSE PRACTITIONER

## 2024-07-08 RX ORDER — EPINEPHRINE 0.3 MG/.3ML
0.3 INJECTION SUBCUTANEOUS
Qty: 2 EACH | Refills: 1 | Status: SHIPPED | OUTPATIENT
Start: 2024-07-08

## 2024-07-08 ASSESSMENT — PAIN SCALES - GENERAL: PAINLEVEL: NO PAIN (0)

## 2024-07-08 NOTE — LETTER
7/8/2024      Johana Duff  [id number] [facility] [address]      Dear Colleague,    Thank you for referring your patient, Johana Duff, to the [facility]. Please see a copy of my visit note below.    Otolaryngology Note         Chief Complaint:     Patient presents with:  Consult: Urticaria referred by Debby Floyd            History of Present Illness:     Johana Duff is a 33 year old female seen today for hives.  She reports the long history of allergic reaction after exposure to red or blue food dye.  She had allergy testing in the distant past, they initially thought that she had a chocolate allergy, however after experience she noted that it is probably the dye and the chocolate that was the culprit.  She has diligently avoided food dyes.  She does note that when she does have an accidental exposure she continues with hives.    She also has concerns that hives, after exposure to illness.  She explains that her family can be sick with a viral illness, she does not necessarily get the typical viral URI symptoms, but will get hives.    April had swollen feet, painful, feeling pressure into the bones  Ears on fire, swollen lips.  No shortness of breath or obvious anaphylaxis.  She is able to eat a variety of foods, avocado tends to upset her stomach, however she just avoids.  She drinks lactose free milk and tolerates well.    She reports nasal congestion with environmental exposures  Spring spring seems to be the worst time with symptoms of - sinus issues, lack of appetite, headache pressure, neck pain, cheek pressure.   Hot spots   Wears gloves working in garden to avoid itchy hands    No dermatographism.     History of ETD     No ear pain, otorrhea, no current vertigo  Intermittent tinnitus, right worse than left  Pressure in ears with flying/changing altitude  No history of ear surgery.    Audiogram completed 5/2/2022:  Bilateral type a tympanograms  Thresholds  showed mild mid frequency sensorineural hearing loss rising to within normal limits bilaterally  Word recognition is 100% bilaterally  No previous hearing aid use.    No chronic cough or trouble swallowing  No previous sinus surgery     Resides in a camper/house - currently living with parents in a house, they are building currently.  There is no water or mold.  There is not carpet in the bedroom.    Infloor heat   Pets: no          Medications:     Current Outpatient Rx   Medication Sig Dispense Refill     EPINEPHrine (ANY BX GENERIC EQUIV) 0.3 MG/0.3ML injection 2-pack Inject 0.3 mLs (0.3 mg) into the muscle once as needed for anaphylaxis 2 each 1     diphenhydrAMINE (BENADRYL) 50 mg/mL  (Patient not taking: Reported on 7/8/2024)       EPINEPHrine (ANY BX GENERIC EQUIV) 0.3 MG/0.3ML injection 2-pack Inject 0.3 mLs (0.3 mg) into the muscle as needed for anaphylaxis Epipen brand May repeat one time in 5-15 minutes if response to initial dose is inadequate. (Patient not taking: Reported on 7/8/2024) 2 each 1            Allergies:     Allergies: Blue dyes (parenteral) and Red dye          Past Medical History:     Past Medical History:   Diagnosis Date     Flexural eczema      Intractable migraine without aura and without status migrainosus      Lactose intolerance      Melanoma (H) 10/2019     Ovarian cyst 2002    left     Thoracogenic scoliosis of thoracolumbar region             Past Surgical History:     Past Surgical History:   Procedure Laterality Date     wisdom teeth extraction  2014       ENT family history reviewed         Social History:     Social History     Tobacco Use     Smoking status: Never     Smokeless tobacco: Never   Substance Use Topics     Alcohol use: Yes     Comment: 3/year     Drug use: No            Review of Systems:     ROS: See HPI         Physical Exam:     /70 (BP Location: Right arm, Patient Position: Sitting, Cuff Size: Adult Regular)   Pulse 72   Temp 97.1  F (36.2  C)  "(Tympanic)   Resp 18   Ht 1.702 m (5' 7.01\")   Wt 63.6 kg (140 lb 3.4 oz)   SpO2 99%   BMI 21.96 kg/m      General - The patient is well nourished and well developed, and appears to have good nutritional status.    Head and Face - Normocephalic and atraumatic, with no gross asymmetry noted.  The facial nerve is intact, with strong symmetric movements.  Voice and Breathing - The patient was breathing comfortably without the use of accessory muscles. There was no wheezing, stridor, or stertor.  The patients voice was clear and strong, and had appropriate pitch and quality.  Ears -The external auditory canals are patent, the tympanic membranes are intact without effusion, retraction or mass.  Bony landmarks are intact.  Eyes - Extraocular movements intact, sclera were not icteric or injected, conjunctiva were pink and moist.  Mouth - Examination of the oral cavity showed pink, healthy oral mucosa. No lesions or ulcerations noted.  The tongue was mobile and midline, and the dentition were in good condition.    Throat - The walls of the oropharynx were smooth, pink, moist, symmetric, and had no lesions or ulcerations.  The tonsillar pillars and soft palate were symmetric.  The uvula was midline on elevation.    Neck - No worrisome lymphadenopathy.   Palpation of the thyroid was soft and smooth, with no nodules or goiter appreciated.  The trachea was mobile and midline.  Nose - External contour is symmetric, no gross deflection or scars.  The nasal passages are examined with nasal speculum.   Nasal mucosa is pink and moist with no abnormal mucus.  The septum was intact and the turbinates are examined with nasal speculum, mp polyps, masses, or purulence noted on examination of anterior nasal cavity.             Assessment and Plan:       ICD-10-CM    1. Urticaria  L50.9 Adult ENT  Referral     Total IgE (Serolab)     Food Panel ADULT (Serolab)     Total IgE (Serolab)     Food Panel ADULT (Serolab)      2. " Hives  L50.9 Adult ENT  Referral     EPINEPHrine (ANY BX GENERIC EQUIV) 0.3 MG/0.3ML injection 2-pack     Total IgE (Serolab)     Food Panel ADULT (Serolab)     Total IgE (Serolab)     Food Panel ADULT (Serolab)      3. Sensorineural hearing loss (SNHL) of both ears  H90.3 Adult Audiology  Referral     Total IgE (Serolab)     Food Panel ADULT (Serolab)     Total IgE (Serolab)     Food Panel ADULT (Serolab)      4. Allergy to food dye  Z91.02       5. Environmental and seasonal allergies  J30.89         Food allergy panel completed today  She will follow-up for environmental skin testing  Complete updated audiogram  Hearing protection as indicated  Indications for allergy testing include:    1) Confirm suspicion of allergic rhinitis due to inhalant allergies  2) Identify the offending allergen to determine specific mode of treatment  3) In the case of chronic rhinosinusitis: when symptoms are not controlled by avoidance and pharmacotherapy  4) In the Asthma patient when exacerbations may be due to perennial allergen exposure  5) Suspect food allergy  6) Otitis Media, chronic rhinitis, atopic dermatitis, Meniere disease, headache, pharyngitis or eye symptoms      Modified quantitative testing (MQT) will be performed.  Signed consent was obtained, and the risks of immunotherapy were discussed, including the potential for anaphylaxis.     If immunotherapy (IT) is recommended, there is continued risk of anaphylaxis.   Anaphylaxis can cause death. The patient will need to be monitored for 30 minutes post injection.  They must present their epinephrine pen prior to injection.  Subcutaneous as well as sublingual immunotherapy (SLIT) were discussed as potential treatment options.  The patient was told SLIT is not approved by the FDA and is cash pay.  The general time frame of immunotherapy was discussed (generally 3-5 years, sometimes longer), and the basic immunology behind IT was discussed.      Melanie  Lorenza WILEY  [facility] ENT      Again, thank you for allowing me to participate in the care of your patient.        Sincerely,        Melanie Britt NP

## 2024-07-08 NOTE — PROGRESS NOTES
Otolaryngology Note         Chief Complaint:     Patient presents with:  Consult: Urticaria referred by Debby Floyd            History of Present Illness:     Johana Duff is a 33 year old female seen today for hives.  She reports the long history of allergic reaction after exposure to red or blue food dye.  She had allergy testing in the distant past, they initially thought that she had a chocolate allergy, however after experience she noted that it is probably the dye and the chocolate that was the culprit.  She has diligently avoided food dyes.  She does note that when she does have an accidental exposure she continues with hives.    She also has concerns that hives, after exposure to illness.  She explains that her family can be sick with a viral illness, she does not necessarily get the typical viral URI symptoms, but will get hives.    April had swollen feet, painful, feeling pressure into the bones  Ears on fire, swollen lips.  No shortness of breath or obvious anaphylaxis.  She is able to eat a variety of foods, avocado tends to upset her stomach, however she just avoids.  She drinks lactose free milk and tolerates well.    She reports nasal congestion with environmental exposures  Spring spring seems to be the worst time with symptoms of - sinus issues, lack of appetite, headache pressure, neck pain, cheek pressure.   Hot spots   Wears gloves working in garden to avoid itchy hands    No dermatographism.     History of ETD     No ear pain, otorrhea, no current vertigo  Intermittent tinnitus, right worse than left  Pressure in ears with flying/changing altitude  No history of ear surgery.    Audiogram completed 5/2/2022:  Bilateral type a tympanograms  Thresholds showed mild mid frequency sensorineural hearing loss rising to within normal limits bilaterally  Word recognition is 100% bilaterally  No previous hearing aid use.    No chronic cough or trouble swallowing  No previous sinus surgery    "  Resides in a camper/house - currently living with parents in a house, they are building currently.  There is no water or mold.  There is not carpet in the bedroom.    Infloor heat   Pets: no          Medications:     Current Outpatient Rx   Medication Sig Dispense Refill    EPINEPHrine (ANY BX GENERIC EQUIV) 0.3 MG/0.3ML injection 2-pack Inject 0.3 mLs (0.3 mg) into the muscle once as needed for anaphylaxis 2 each 1    diphenhydrAMINE (BENADRYL) 50 mg/mL  (Patient not taking: Reported on 7/8/2024)      EPINEPHrine (ANY BX GENERIC EQUIV) 0.3 MG/0.3ML injection 2-pack Inject 0.3 mLs (0.3 mg) into the muscle as needed for anaphylaxis Epipen brand May repeat one time in 5-15 minutes if response to initial dose is inadequate. (Patient not taking: Reported on 7/8/2024) 2 each 1            Allergies:     Allergies: Blue dyes (parenteral) and Red dye          Past Medical History:     Past Medical History:   Diagnosis Date    Flexural eczema     Intractable migraine without aura and without status migrainosus     Lactose intolerance     Melanoma (H) 10/2019    Ovarian cyst 2002    left    Thoracogenic scoliosis of thoracolumbar region             Past Surgical History:     Past Surgical History:   Procedure Laterality Date    wisdom teeth extraction  2014       ENT family history reviewed         Social History:     Social History     Tobacco Use    Smoking status: Never    Smokeless tobacco: Never   Substance Use Topics    Alcohol use: Yes     Comment: 3/year    Drug use: No            Review of Systems:     ROS: See HPI         Physical Exam:     /70 (BP Location: Right arm, Patient Position: Sitting, Cuff Size: Adult Regular)   Pulse 72   Temp 97.1  F (36.2  C) (Tympanic)   Resp 18   Ht 1.702 m (5' 7.01\")   Wt 63.6 kg (140 lb 3.4 oz)   SpO2 99%   BMI 21.96 kg/m      General - The patient is well nourished and well developed, and appears to have good nutritional status.    Head and Face - Normocephalic and " atraumatic, with no gross asymmetry noted.  The facial nerve is intact, with strong symmetric movements.  Voice and Breathing - The patient was breathing comfortably without the use of accessory muscles. There was no wheezing, stridor, or stertor.  The patients voice was clear and strong, and had appropriate pitch and quality.  Ears -The external auditory canals are patent, the tympanic membranes are intact without effusion, retraction or mass.  Bony landmarks are intact.  Eyes - Extraocular movements intact, sclera were not icteric or injected, conjunctiva were pink and moist.  Mouth - Examination of the oral cavity showed pink, healthy oral mucosa. No lesions or ulcerations noted.  The tongue was mobile and midline, and the dentition were in good condition.    Throat - The walls of the oropharynx were smooth, pink, moist, symmetric, and had no lesions or ulcerations.  The tonsillar pillars and soft palate were symmetric.  The uvula was midline on elevation.    Neck - No worrisome lymphadenopathy.   Palpation of the thyroid was soft and smooth, with no nodules or goiter appreciated.  The trachea was mobile and midline.  Nose - External contour is symmetric, no gross deflection or scars.  The nasal passages are examined with nasal speculum.   Nasal mucosa is pink and moist with no abnormal mucus.  The septum was intact and the turbinates are examined with nasal speculum, mp polyps, masses, or purulence noted on examination of anterior nasal cavity.             Assessment and Plan:       ICD-10-CM    1. Urticaria  L50.9 Adult ENT  Referral     Total IgE (Serolab)     Food Panel ADULT (Serolab)     Total IgE (Serolab)     Food Panel ADULT (Serolab)      2. Hives  L50.9 Adult ENT  Referral     EPINEPHrine (ANY BX GENERIC EQUIV) 0.3 MG/0.3ML injection 2-pack     Total IgE (Serolab)     Food Panel ADULT (Serolab)     Total IgE (Serolab)     Food Panel ADULT (Serolab)      3. Sensorineural hearing loss  (SNHL) of both ears  H90.3 Adult Audiology  Referral     Total IgE (Serolab)     Food Panel ADULT (Serolab)     Total IgE (Serolab)     Food Panel ADULT (Serolab)      4. Allergy to food dye  Z91.02       5. Environmental and seasonal allergies  J30.89         Food allergy panel completed today  She will follow-up for environmental skin testing  Complete updated audiogram  Hearing protection as indicated  Indications for allergy testing include:    1) Confirm suspicion of allergic rhinitis due to inhalant allergies  2) Identify the offending allergen to determine specific mode of treatment  3) In the case of chronic rhinosinusitis: when symptoms are not controlled by avoidance and pharmacotherapy  4) In the Asthma patient when exacerbations may be due to perennial allergen exposure  5) Suspect food allergy  6) Otitis Media, chronic rhinitis, atopic dermatitis, Meniere disease, headache, pharyngitis or eye symptoms      Modified quantitative testing (MQT) will be performed.  Signed consent was obtained, and the risks of immunotherapy were discussed, including the potential for anaphylaxis.     If immunotherapy (IT) is recommended, there is continued risk of anaphylaxis.   Anaphylaxis can cause death. The patient will need to be monitored for 30 minutes post injection.  They must present their epinephrine pen prior to injection.  Subcutaneous as well as sublingual immunotherapy (SLIT) were discussed as potential treatment options.  The patient was told SLIT is not approved by the FDA and is cash pay.  The general time frame of immunotherapy was discussed (generally 3-5 years, sometimes longer), and the basic immunology behind IT was discussed.      Melanie WILEY  [facility] ENT

## 2024-07-08 NOTE — PATIENT INSTRUCTIONS
Thank you for allowing Melanie Britt and our ENT team to participate in your care.  If your medications are too expensive, please give the nurse a call.  We can possibly change this medication.  If you have a scheduling or an appointment question please contact our Health Unit Coordinator at their direct line [phone] ext [phone].   ALL nursing questions or concerns can be directed to your ENT nurse at: [phone] - [place]       Ordered a food allergy panel go to lab and have that drawn today.  Nurse will call with results.     Indications for allergy testing include:   1) Confirm suspicion of allergic rhinitis due to inhalant allergies  2) Identify the offending allergen to determine specific mode of treatment  3) In the case of chronic rhinosinusitis: when symptoms are not controlled by avoidance and pharmacotherapy  4) In the Asthma patient when exacerbations may be due to perennial allergen exposure  5) Suspect food allergy  6) Otitis Media, chronic rhinitis, atopic dermatitis, Meniere disease, headache, pharyngitis or eye symptoms    Modified quantitative testing (MQT) will be performed.  Signed consent was obtained, and the risks of immunotherapy were discussed, including the potential for anaphylaxis.    If immunotherapy (IT) is recommended, there is continued risk of anaphylaxis.   Anaphylaxis can cause death. The patient will need to be monitored for 30 minutes post injection.  They must present their epinephrine pen prior to injection.  Subcutaneous as well as sublingual immunotherapy (SLIT) were discussed as potential treatment options.  The patient was told SLIT is not approved by the FDA and is cash pay.  The general time frame of immunotherapy was discussed (generally 3-5 years, sometimes longer), and the basic immunology behind IT was discussed.    Audiogram ordered make an appointment today before you leave.

## 2024-07-22 LAB
SCANNED LAB RESULT: NORMAL
SCANNED LAB RESULT: NORMAL

## 2024-07-31 NOTE — RESULT ENCOUNTER NOTE
Shoot, that was my fault!  Can you call her with results of the food panel and ask if she would still like to complete environmentals?  If so I will order.  She can also have it drawn in the future with other labs.  Lala

## 2024-08-02 ENCOUNTER — TELEPHONE (OUTPATIENT)
Dept: ALLERGY | Facility: OTHER | Age: 34
End: 2024-08-02

## 2024-08-02 DIAGNOSIS — T78.40XA ALLERGY, INITIAL ENCOUNTER: Primary | ICD-10-CM

## 2024-08-02 RX ORDER — CETIRIZINE HYDROCHLORIDE 5 MG/1
5 TABLET ORAL DAILY
Status: CANCELLED | OUTPATIENT
Start: 2024-08-02

## 2024-08-02 NOTE — TELEPHONE ENCOUNTER
Call to patient. Verified last name and . Gave patient food allergy results. Patient stated she would still like to have the environmental panel done. Message sent to provider to have that ordered. Patient verbalizes understanding and has no questions or concerns at call completion.

## 2024-08-02 NOTE — PROGRESS NOTES
"Chief Complaint   Patient presents with    Follow Up     MQT Follow up     Johana presents for follow up regarding allergy testing.   She has been doing well  Moderate positives to Hansel/ grass.   She has food dye allergy.     She reports nasal congestion with environmental exposures  Spring spring seems to be the worst time with symptoms of - sinus issues, lack of appetite, headache pressure, neck pain, cheek pressure.   Hot spots   Wears gloves working in garden to avoid itchy hands     No dermatographism.      History of ETD      No ear pain, otorrhea, no current vertigo  Intermittent tinnitus, right worse than left  Pressure in ears with flying/changing altitude  No history of ear surgery.         Past Medical History:   Diagnosis Date    Flexural eczema     Intractable migraine without aura and without status migrainosus     Lactose intolerance     Melanoma (H) 10/2019    Ovarian cyst 2002    left    Thoracogenic scoliosis of thoracolumbar region         Allergies   Allergen Reactions    Blue Dyes (Parenteral)     Red Dye      Current Outpatient Medications   Medication Sig Dispense Refill    diphenhydrAMINE (BENADRYL) 50 mg/mL  (Patient not taking: Reported on 7/8/2024)      EPINEPHrine (ANY BX GENERIC EQUIV) 0.3 MG/0.3ML injection 2-pack Inject 0.3 mLs (0.3 mg) into the muscle once as needed for anaphylaxis 2 each 1    EPINEPHrine (ANY BX GENERIC EQUIV) 0.3 MG/0.3ML injection 2-pack Inject 0.3 mLs (0.3 mg) into the muscle as needed for anaphylaxis Epipen brand May repeat one time in 5-15 minutes if response to initial dose is inadequate. (Patient not taking: Reported on 7/8/2024) 2 each 1     No current facility-administered medications for this visit.     ROS- SEE HPI  /66 (BP Location: Left arm, Patient Position: Sitting, Cuff Size: Adult Regular)   Pulse 86   Temp 98.1  F (36.7  C) (Tympanic)   Resp 16   Ht 1.702 m (5' 7.01\")   Wt 63.6 kg (140 lb 3.4 oz)   SpO2 100%   BMI 21.96 kg/m  "     General - The patient is well nourished and well developed, and appears to have good nutritional status.    Head and Face - Normocephalic and atraumatic, with no gross asymmetry noted.  The facial nerve is intact, with strong symmetric movements.  Voice and Breathing - The patient was breathing comfortably without the use of accessory muscles. There was no wheezing, stridor, or stertor.  The patients voice was clear and strong, and had appropriate pitch and quality.  Ears -The external auditory canals are patent, the tympanic membranes are intact without effusion, retraction or mass.  Bony landmarks are intact.  Eyes - Extraocular movements intact, sclera were not icteric or injected, conjunctiva were pink and moist.  Mouth - Examination of the oral cavity showed pink, healthy oral mucosa. No lesions or ulcerations noted.  The tongue was mobile and midline, and the dentition were in good condition.    Throat - The walls of the oropharynx were smooth, pink, moist, symmetric, and had no lesions or ulcerations.  The tonsillar pillars and soft palate were symmetric.  The uvula was midline on elevation.    Neck - No worrisome lymphadenopathy.   Palpation of the thyroid was soft and smooth, with no nodules or goiter appreciated.  The trachea was mobile and midline.            Assessment and Plan:       ICD-10-CM    1. Urticaria  L50.9 Inhalent Panel [place] (Serolab)            Work on avoidance  Benadryl and Zyrtec PRN  She is very diligent in watching her oral intake specifically with dyes.     She will follow up as needed  Additional inhalant panel pending     Laila Vides PA-C  ENT  [facility], [place]

## 2024-08-04 DIAGNOSIS — L50.9 URTICARIA: Primary | ICD-10-CM

## 2024-08-06 ENCOUNTER — OFFICE VISIT (OUTPATIENT)
Dept: CHIROPRACTIC MEDICINE | Facility: OTHER | Age: 34
End: 2024-08-06
Attending: CHIROPRACTOR
Payer: COMMERCIAL

## 2024-08-06 ENCOUNTER — OFFICE VISIT (OUTPATIENT)
Dept: AUDIOLOGY | Facility: OTHER | Age: 34
End: 2024-08-06
Attending: PHYSICIAN ASSISTANT
Payer: COMMERCIAL

## 2024-08-06 ENCOUNTER — OFFICE VISIT (OUTPATIENT)
Dept: OTOLARYNGOLOGY | Facility: OTHER | Age: 34
End: 2024-08-06
Attending: PHYSICIAN ASSISTANT
Payer: COMMERCIAL

## 2024-08-06 ENCOUNTER — OFFICE VISIT (OUTPATIENT)
Dept: ALLERGY | Facility: OTHER | Age: 34
End: 2024-08-06
Attending: PHYSICIAN ASSISTANT
Payer: COMMERCIAL

## 2024-08-06 VITALS
HEART RATE: 86 BPM | BODY MASS INDEX: 22.01 KG/M2 | OXYGEN SATURATION: 100 % | WEIGHT: 140.21 LBS | HEIGHT: 67 IN | SYSTOLIC BLOOD PRESSURE: 116 MMHG | TEMPERATURE: 98.1 F | DIASTOLIC BLOOD PRESSURE: 66 MMHG | RESPIRATION RATE: 16 BRPM

## 2024-08-06 VITALS
TEMPERATURE: 98.1 F | DIASTOLIC BLOOD PRESSURE: 66 MMHG | HEIGHT: 67 IN | HEART RATE: 86 BPM | RESPIRATION RATE: 16 BRPM | SYSTOLIC BLOOD PRESSURE: 116 MMHG | WEIGHT: 140.21 LBS | BODY MASS INDEX: 22.01 KG/M2 | OXYGEN SATURATION: 100 %

## 2024-08-06 DIAGNOSIS — H90.3 SENSORINEURAL HEARING LOSS (SNHL) OF BOTH EARS: ICD-10-CM

## 2024-08-06 DIAGNOSIS — M99.01 SEGMENTAL AND SOMATIC DYSFUNCTION OF CERVICAL REGION: ICD-10-CM

## 2024-08-06 DIAGNOSIS — L50.9 URTICARIA: ICD-10-CM

## 2024-08-06 DIAGNOSIS — M99.03 SEGMENTAL AND SOMATIC DYSFUNCTION OF LUMBAR REGION: Primary | ICD-10-CM

## 2024-08-06 DIAGNOSIS — T78.40XA ALLERGY, INITIAL ENCOUNTER: Primary | ICD-10-CM

## 2024-08-06 DIAGNOSIS — M99.02 SEGMENTAL AND SOMATIC DYSFUNCTION OF THORACIC REGION: ICD-10-CM

## 2024-08-06 DIAGNOSIS — M54.50 ACUTE BILATERAL LOW BACK PAIN WITHOUT SCIATICA: ICD-10-CM

## 2024-08-06 PROCEDURE — 36415 COLL VENOUS BLD VENIPUNCTURE: CPT | Performed by: PHYSICIAN ASSISTANT

## 2024-08-06 PROCEDURE — 86003 ALLG SPEC IGE CRUDE XTRC EA: CPT | Mod: 90 | Performed by: PHYSICIAN ASSISTANT

## 2024-08-06 PROCEDURE — 92557 COMPREHENSIVE HEARING TEST: CPT | Performed by: AUDIOLOGIST

## 2024-08-06 PROCEDURE — 95004 PERQ TESTS W/ALRGNC XTRCS: CPT

## 2024-08-06 PROCEDURE — 98941 CHIROPRACT MANJ 3-4 REGIONS: CPT | Mod: AT | Performed by: CHIROPRACTOR

## 2024-08-06 PROCEDURE — 99212 OFFICE O/P EST SF 10 MIN: CPT | Mod: 25 | Performed by: PHYSICIAN ASSISTANT

## 2024-08-06 PROCEDURE — 92550 TYMPANOMETRY & REFLEX THRESH: CPT | Performed by: AUDIOLOGIST

## 2024-08-06 RX ORDER — CETIRIZINE HYDROCHLORIDE 5 MG/1
10 TABLET ORAL ONCE
Status: COMPLETED | OUTPATIENT
Start: 2024-08-06 | End: 2024-08-06

## 2024-08-06 ASSESSMENT — PAIN SCALES - GENERAL
PAINLEVEL: NO PAIN (0)
PAINLEVEL: NO PAIN (0)

## 2024-08-06 NOTE — PROGRESS NOTES
This patient presents today for allergy skin testing.      Symptoms have included swollen lips, itchy body, hives, and are worse in the winter season.     This patient lives in a camper at the moment. Was previously in a brick home attached to a [employer] with moisture and mold issues, it did have a basement. This patient does not suspect mold, water or moisture issues in the camper.  There is carpet in the camper, and it is in her bedroom.  The camper has propane heat and does have central air conditioning.        This patient has no pets.     This patient has had allergy testing in the past many years ago.    This patient's medications have been reviewed prior to testing and all appropriate medications have been stopped.    Consent is signed by patient and signature is verified.     MQT/ID test is performed per protocol.  The patient tolerated testing well.  All findings are recorded on the paper flow sheet. Results are reviewed with this patient. They are given written information regarding allergy.       The patient will follow-up with Laila Vides PA-C for treatment plan.      Jaycee Lewis RN

## 2024-08-06 NOTE — PATIENT INSTRUCTIONS
Obtain Zyrtec 10 mg for as needed.   Complete allergy panel. Results take about 10-14 days to return.       Thank you for allowing Laila Vides PA-C and our ENT team to participate in your care.  If your medications are too expensive, please give the nurse a call.  We can possibly change this medication.  If you have a scheduling or an appointment question please contact our Health Unit Coordinator at [phone], Ext. [phone].    ALL nursing questions or concerns can be directed to your ENT nurse at: [phone] [place]

## 2024-08-06 NOTE — LETTER
8/6/2024      Johana Duff  [id number] [facility] [id number]      Dear Colleague,    Thank you for referring your patient, Johana Duff, to the [facility]. Please see a copy of my visit note below.    Chief Complaint   Patient presents with     Follow Up     MQT Follow up     Johana presents for follow up regarding allergy testing.   She has been doing well  Moderate positives to Hansel/ grass.   She has food dye allergy.     She reports nasal congestion with environmental exposures  Spring spring seems to be the worst time with symptoms of - sinus issues, lack of appetite, headache pressure, neck pain, cheek pressure.   Hot spots   Wears gloves working in garden to avoid itchy hands     No dermatographism.      History of ETD      No ear pain, otorrhea, no current vertigo  Intermittent tinnitus, right worse than left  Pressure in ears with flying/changing altitude  No history of ear surgery.         Past Medical History:   Diagnosis Date     Flexural eczema      Intractable migraine without aura and without status migrainosus      Lactose intolerance      Melanoma (H) 10/2019     Ovarian cyst 2002    left     Thoracogenic scoliosis of thoracolumbar region         Allergies   Allergen Reactions     Blue Dyes (Parenteral)      Red Dye      Current Outpatient Medications   Medication Sig Dispense Refill     diphenhydrAMINE (BENADRYL) 50 mg/mL  (Patient not taking: Reported on 7/8/2024)       EPINEPHrine (ANY BX GENERIC EQUIV) 0.3 MG/0.3ML injection 2-pack Inject 0.3 mLs (0.3 mg) into the muscle once as needed for anaphylaxis 2 each 1     EPINEPHrine (ANY BX GENERIC EQUIV) 0.3 MG/0.3ML injection 2-pack Inject 0.3 mLs (0.3 mg) into the muscle as needed for anaphylaxis Epipen brand May repeat one time in 5-15 minutes if response to initial dose is inadequate. (Patient not taking: Reported on 7/8/2024) 2 each 1     No current facility-administered medications for this visit.     ROS- SEE  "HPI  /66 (BP Location: Left arm, Patient Position: Sitting, Cuff Size: Adult Regular)   Pulse 86   Temp 98.1  F (36.7  C) (Tympanic)   Resp 16   Ht 1.702 m (5' 7.01\")   Wt 63.6 kg (140 lb 3.4 oz)   SpO2 100%   BMI 21.96 kg/m      General - The patient is well nourished and well developed, and appears to have good nutritional status.    Head and Face - Normocephalic and atraumatic, with no gross asymmetry noted.  The facial nerve is intact, with strong symmetric movements.  Voice and Breathing - The patient was breathing comfortably without the use of accessory muscles. There was no wheezing, stridor, or stertor.  The patients voice was clear and strong, and had appropriate pitch and quality.  Ears -The external auditory canals are patent, the tympanic membranes are intact without effusion, retraction or mass.  Bony landmarks are intact.  Eyes - Extraocular movements intact, sclera were not icteric or injected, conjunctiva were pink and moist.  Mouth - Examination of the oral cavity showed pink, healthy oral mucosa. No lesions or ulcerations noted.  The tongue was mobile and midline, and the dentition were in good condition.    Throat - The walls of the oropharynx were smooth, pink, moist, symmetric, and had no lesions or ulcerations.  The tonsillar pillars and soft palate were symmetric.  The uvula was midline on elevation.    Neck - No worrisome lymphadenopathy.   Palpation of the thyroid was soft and smooth, with no nodules or goiter appreciated.  The trachea was mobile and midline.            Assessment and Plan:       ICD-10-CM    1. Urticaria  L50.9 Inhalent Panel [place] Region ([employer])            Work on avoidance  Benadryl and Zyrtec PRN  She is very diligent in watching her oral intake specifically with dyes.     She will follow up as needed  Additional inhalant panel pending     Laila Vides PA-C  ENT  [facility] [place]      Again, thank you for allowing me to participate in the care of your " patient.        Sincerely,        Laila Vides PA-C

## 2024-08-06 NOTE — PROGRESS NOTES
Audiology Evaluation Completed. Please refer SCANNED AUDIOGRAM and/or TYMPANOGRAM for BACKGROUND, RESULTS, RECOMMENDATIONS.      Alba PRATHER, [facility]  Audiologist #[id number]

## 2024-08-12 ENCOUNTER — OFFICE VISIT (OUTPATIENT)
Dept: CHIROPRACTIC MEDICINE | Facility: OTHER | Age: 34
End: 2024-08-12
Attending: CHIROPRACTOR
Payer: COMMERCIAL

## 2024-08-12 DIAGNOSIS — M99.01 SEGMENTAL AND SOMATIC DYSFUNCTION OF CERVICAL REGION: ICD-10-CM

## 2024-08-12 DIAGNOSIS — M54.41 ACUTE RIGHT-SIDED LOW BACK PAIN WITH RIGHT-SIDED SCIATICA: ICD-10-CM

## 2024-08-12 DIAGNOSIS — M99.03 SEGMENTAL AND SOMATIC DYSFUNCTION OF LUMBAR REGION: Primary | ICD-10-CM

## 2024-08-12 DIAGNOSIS — M99.02 SEGMENTAL AND SOMATIC DYSFUNCTION OF THORACIC REGION: ICD-10-CM

## 2024-08-12 PROCEDURE — 98941 CHIROPRACT MANJ 3-4 REGIONS: CPT | Mod: AT | Performed by: CHIROPRACTOR

## 2024-08-12 NOTE — PROGRESS NOTES
Subjective Finding:    Chief compalint: Patient presents with:  Back Pain , Pain Scale: 3/10, Intensity: dull, Duration: 1 days, Radiating: no.    Date of injury:     Activities that the pain restricts:   Home/household/hobbies/social activities: Yes.  Work duties: No.  Sleep: No.  Makes symptoms better: rest.  Makes symptoms worse: lumbar flexion and walking.  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 No.  Work related: No.  Automobile related injury: No.    Objective and Assessment:    Posture Analysis:   High shoulder: .  Head tilt: .  High iliac crest: .  Head carriage: neutral.  Thoracic Kyphosis: neutral.  Lumbar Lordosis: neutral.    Lumbar Range of Motion: flexion decreased and extension decreased.  Cervical Range of Motion: .  Thoracic Range of Motion: .  Extremity Range of Motion: .    Palpation:   Quad lumb: bilateral, referred pain: no    Segmental dysfunction pre-treatment and treatment area: C3, C4, T5, L4, and L5.    Assessment post-treatment:  Cervical: ROM increased.  Thoracic: ROM increased.  Lumbar: ROM increased.    Comments: .      Complicating Factors: .    Procedure(s):  CMT:  77841 Chiropractic manipulative treatment 3-4 regions performed   Cervical: Diversified, See above for level, Supine, Thoracic: Diversified, See above for level, Prone, and Lumbar: Diversified, See above for level, Side posture    Modalities:  None performed this visit    Therapeutic procedures:  None    Plan:  Treatment plan: PRN.  Instructed patient: stretch as instructed at visit.  Short term goals: increase ROM.  Long term goals: increase ADL.  Prognosis: good.

## 2024-08-14 NOTE — PROGRESS NOTES
Subjective Finding:    Chief compalint: Patient presents with:  Back Pain , Pain Scale: 4/10, Intensity: dull, Duration: 2 days, Radiating:  right buttock.    Date of injury:     Activities that the pain restricts:   Home/household/hobbies/social activities: Yes.  Work duties: No.  Sleep: No.  Makes symptoms better: rest.  Makes symptoms worse: lumbar flexion.  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 No.  Work related: No.  Automobile related injury: No.    Objective and Assessment:    Posture Analysis:   High shoulder: .  Head tilt: .  High iliac crest: right.  Head carriage: neutral.  Thoracic Kyphosis: neutral.  Lumbar Lordosis: forward.    Lumbar Range of Motion: extension decreased.  Cervical Range of Motion: .  Thoracic Range of Motion: .  Extremity Range of Motion: .    Palpation:   Quad lumb: right, referred pain: no    Segmental dysfunction pre-treatment and treatment area: C6, T5, and L5.    Assessment post-treatment:  Cervical: ROM increased.  Thoracic: ROM increased.  Lumbar: ROM increased.    Comments: .      Complicating Factors: .    Procedure(s):  CMT:  66805 Chiropractic manipulative treatment 3-4 regions performed   Cervical: Diversified, See above for level, Supine, Thoracic: Diversified, See above for level, Prone, and Lumbar: Diversified, See above for level, Side posture    Modalities:  None performed this visit    Therapeutic procedures:  None    Plan:  Treatment plan: PRN.  Instructed patient: stretch as instructed at visit.  Short term goals: reduce pain.  Long term goals: increase ADL.  Prognosis: very good.

## 2024-08-23 LAB — SCANNED LAB RESULT: NORMAL

## 2024-09-11 NOTE — PROGRESS NOTES
rto 1 wk  If you think your bag is broken; you bleed like a period; you think you are in labor or you are worried about your baby movement please call the labor and delivery unit at [phone].    
None

## 2024-09-15 ENCOUNTER — HEALTH MAINTENANCE LETTER (OUTPATIENT)
Age: 34
End: 2024-09-15

## 2024-10-13 SDOH — HEALTH STABILITY: PHYSICAL HEALTH: ON AVERAGE, HOW MANY DAYS PER WEEK DO YOU ENGAGE IN MODERATE TO STRENUOUS EXERCISE (LIKE A BRISK WALK)?: 1 DAY

## 2024-10-13 SDOH — HEALTH STABILITY: PHYSICAL HEALTH: ON AVERAGE, HOW MANY MINUTES DO YOU ENGAGE IN EXERCISE AT THIS LEVEL?: 20 MIN

## 2024-10-13 ASSESSMENT — SOCIAL DETERMINANTS OF HEALTH (SDOH): HOW OFTEN DO YOU GET TOGETHER WITH FRIENDS OR RELATIVES?: MORE THAN THREE TIMES A WEEK

## 2024-10-14 ENCOUNTER — OFFICE VISIT (OUTPATIENT)
Dept: FAMILY MEDICINE | Facility: OTHER | Age: 34
End: 2024-10-14
Attending: FAMILY MEDICINE
Payer: COMMERCIAL

## 2024-10-14 ENCOUNTER — APPOINTMENT (OUTPATIENT)
Dept: LAB | Facility: OTHER | Age: 34
End: 2024-10-14
Attending: FAMILY MEDICINE
Payer: COMMERCIAL

## 2024-10-14 VITALS
DIASTOLIC BLOOD PRESSURE: 61 MMHG | HEIGHT: 67 IN | TEMPERATURE: 99.1 F | OXYGEN SATURATION: 100 % | WEIGHT: 132.2 LBS | RESPIRATION RATE: 16 BRPM | HEART RATE: 94 BPM | SYSTOLIC BLOOD PRESSURE: 98 MMHG | BODY MASS INDEX: 20.75 KG/M2

## 2024-10-14 DIAGNOSIS — R51.9 CHRONIC INTRACTABLE HEADACHE, UNSPECIFIED HEADACHE TYPE: ICD-10-CM

## 2024-10-14 DIAGNOSIS — E55.9 HYPOVITAMINOSIS D: ICD-10-CM

## 2024-10-14 DIAGNOSIS — G56.03 BILATERAL CARPAL TUNNEL SYNDROME: ICD-10-CM

## 2024-10-14 DIAGNOSIS — Z12.4 CERVICAL CANCER SCREENING: ICD-10-CM

## 2024-10-14 DIAGNOSIS — E78.5 HYPERLIPIDEMIA LDL GOAL <130: ICD-10-CM

## 2024-10-14 DIAGNOSIS — Z00.00 ROUTINE GENERAL MEDICAL EXAMINATION AT A HEALTH CARE FACILITY: Primary | ICD-10-CM

## 2024-10-14 DIAGNOSIS — Z71.89 ACP (ADVANCE CARE PLANNING): ICD-10-CM

## 2024-10-14 DIAGNOSIS — C43.71 MALIGNANT MELANOMA OF SKIN OF RIGHT LEG (H): ICD-10-CM

## 2024-10-14 DIAGNOSIS — G89.29 CHRONIC INTRACTABLE HEADACHE, UNSPECIFIED HEADACHE TYPE: ICD-10-CM

## 2024-10-14 DIAGNOSIS — Z11.59 ENCOUNTER FOR SCREENING FOR OTHER VIRAL DISEASES: ICD-10-CM

## 2024-10-14 PROBLEM — R20.2 PARESTHESIA: Status: RESOLVED | Noted: 2021-01-25 | Resolved: 2024-10-14

## 2024-10-14 PROBLEM — R42 VERTIGO: Status: RESOLVED | Noted: 2021-01-25 | Resolved: 2024-10-14

## 2024-10-14 PROBLEM — R53.83 FATIGUE, UNSPECIFIED TYPE: Status: RESOLVED | Noted: 2021-01-25 | Resolved: 2024-10-14

## 2024-10-14 PROBLEM — M72.2 PLANTAR FASCIITIS: Status: RESOLVED | Noted: 2017-09-28 | Resolved: 2024-10-14

## 2024-10-14 PROBLEM — D22.9 ATYPICAL NEVI: Status: RESOLVED | Noted: 2019-10-10 | Resolved: 2024-10-14

## 2024-10-14 PROBLEM — L65.9 HAIR LOSS: Status: RESOLVED | Noted: 2021-01-25 | Resolved: 2024-10-14

## 2024-10-14 LAB
ALBUMIN SERPL BCG-MCNC: 4.9 G/DL (ref 3.5–5.2)
ALP SERPL-CCNC: 53 U/L (ref 40–150)
ALT SERPL W P-5'-P-CCNC: 18 U/L (ref 0–50)
ANION GAP SERPL CALCULATED.3IONS-SCNC: 11 MMOL/L (ref 7–15)
AST SERPL W P-5'-P-CCNC: 13 U/L (ref 0–45)
BILIRUB SERPL-MCNC: 0.6 MG/DL
BUN SERPL-MCNC: 9.8 MG/DL (ref 6–20)
CALCIUM SERPL-MCNC: 9.6 MG/DL (ref 8.8–10.4)
CHLORIDE SERPL-SCNC: 103 MMOL/L (ref 98–107)
CHOLEST SERPL-MCNC: 174 MG/DL
CREAT SERPL-MCNC: 0.84 MG/DL (ref 0.51–0.95)
EGFRCR SERPLBLD CKD-EPI 2021: >90 ML/MIN/1.73M2
FASTING STATUS PATIENT QL REPORTED: NO
FASTING STATUS PATIENT QL REPORTED: NO
GLUCOSE SERPL-MCNC: 92 MG/DL (ref 70–99)
HCO3 SERPL-SCNC: 25 MMOL/L (ref 22–29)
HDLC SERPL-MCNC: 70 MG/DL
LDLC SERPL CALC-MCNC: 93 MG/DL
NONHDLC SERPL-MCNC: 104 MG/DL
POTASSIUM SERPL-SCNC: 4 MMOL/L (ref 3.4–5.3)
PROT SERPL-MCNC: 7.5 G/DL (ref 6.4–8.3)
SODIUM SERPL-SCNC: 139 MMOL/L (ref 135–145)
TRIGL SERPL-MCNC: 57 MG/DL

## 2024-10-14 PROCEDURE — 36415 COLL VENOUS BLD VENIPUNCTURE: CPT | Performed by: FAMILY MEDICINE

## 2024-10-14 PROCEDURE — 86803 HEPATITIS C AB TEST: CPT | Performed by: FAMILY MEDICINE

## 2024-10-14 PROCEDURE — 99214 OFFICE O/P EST MOD 30 MIN: CPT | Mod: 25 | Performed by: FAMILY MEDICINE

## 2024-10-14 PROCEDURE — 80053 COMPREHEN METABOLIC PANEL: CPT | Performed by: FAMILY MEDICINE

## 2024-10-14 PROCEDURE — G0123 SCREEN CERV/VAG THIN LAYER: HCPCS | Performed by: FAMILY MEDICINE

## 2024-10-14 PROCEDURE — 99395 PREV VISIT EST AGE 18-39: CPT | Performed by: FAMILY MEDICINE

## 2024-10-14 PROCEDURE — 80061 LIPID PANEL: CPT | Performed by: FAMILY MEDICINE

## 2024-10-14 RX ORDER — MAGNESIUM GLYCINATE 100 MG
CAPSULE ORAL
COMMUNITY

## 2024-10-14 SDOH — HEALTH STABILITY: PHYSICAL HEALTH: ON AVERAGE, HOW MANY MINUTES DO YOU ENGAGE IN EXERCISE AT THIS LEVEL?: 50 MIN

## 2024-10-14 SDOH — HEALTH STABILITY: PHYSICAL HEALTH: ON AVERAGE, HOW MANY DAYS PER WEEK DO YOU ENGAGE IN MODERATE TO STRENUOUS EXERCISE (LIKE A BRISK WALK)?: 3 DAYS

## 2024-10-14 ASSESSMENT — LIFESTYLE VARIABLES
HOW OFTEN DO YOU HAVE SIX OR MORE DRINKS ON ONE OCCASION: NEVER
HOW MANY STANDARD DRINKS CONTAINING ALCOHOL DO YOU HAVE ON A TYPICAL DAY: 1 OR 2
SKIP TO QUESTIONS 9-10: 1
HOW OFTEN DO YOU HAVE A DRINK CONTAINING ALCOHOL: MONTHLY OR LESS
AUDIT-C TOTAL SCORE: 1

## 2024-10-14 ASSESSMENT — PAIN SCALES - GENERAL: PAINLEVEL: NO PAIN (0)

## 2024-10-14 NOTE — PROGRESS NOTES
Preventive Care Visit  [facility]  Debby Floyd MD, Family Medicine  Oct 14, 2024      Assessment & Plan     Routine general medical examination at a health care facility  Follow-up 1 year    Chronic intractable headache, unspecified headache type  resolved    Bilateral carpal tunnel syndrome  Resolved, was when she was pregnant    Hypovitaminosis D  Labs not checked today    Malignant melanoma of skin of right leg (H)  Needs annual skin check  - Adult Dermatology  Referral; Future    Hyperlipidemia LDL goal <130  Labs look great  - Comprehensive metabolic panel; Future  - Lipid Profile (Chol, Trig, HDL, LDL calc); Future  - Comprehensive metabolic panel  - Lipid Profile (Chol, Trig, HDL, LDL calc)    Encounter for screening for other viral diseases  due  - Hepatitis C Screen Reflex to HCV RNA Quant and Genotype; Future  - Hepatitis C Screen Reflex to HCV RNA Quant and Genotype    Cervical cancer screening  due  - Gynecologic Cytology (PAP Smear); Future    ACP (advance care planning)  Not discussed    Patient has been advised of split billing requirements and indicates understanding: Yes    Counseling  Appropriate preventive services were addressed with this patient via screening, questionnaire, or discussion as appropriate for fall prevention, nutrition, physical activity, Tobacco-use cessation, social engagement, weight loss and cognition.  Checklist reviewing preventive services available has been given to the patient.  Reviewed patient's diet, addressing concerns and/or questions.   She is at risk for lack of exercise and has been provided with information to increase physical activity for the benefit of her well-being.     Patient was agreeable to this plan and had no further questions.  There are no Patient Instructions on file for this visit.    No follow-ups on file.    Priya Vaughn is a 33 year old, presenting for the following:  Physical        10/14/2024    12:59 PM    Additional Questions   Roomed by Bishnu ferrer lpn   Accompanied by self        Health Care Directive  Patient does not have a Health Care Directive or Living Will: Discussed advance care planning with patient; information given to patient to review.    HPI  Hyperlipidemia Follow-Up    Are you regularly taking any medication or supplement to lower your cholesterol?   No  Are you having muscle aches or other side effects that you think could be caused by your cholesterol lowering medication?  No    Migraine   Since your last clinic visit, how have your headaches changed?  Improved  How often are you getting headaches or migraines? none   Are you able to do normal daily activities when you have a migraine? Yes  Are you taking rescue/relief medications? (Select all that apply) No  How helpful is your rescue/relief medication?  N/A  Are you taking any medications to prevent migraines? (Select all that apply)  No  In the past 4 weeks, how often have you gone to urgent care or the emergency room because of your headaches?  0        10/13/2024   General Health   How would you rate your overall physical health? Good   Feel stress (tense, anxious, or unable to sleep) Not at all            10/13/2024   Nutrition   Three or more servings of calcium each day? Yes   Diet: Regular (no restrictions)   How many servings of fruit and vegetables per day? (!) 2-3   How many sweetened beverages each day? 0-1            10/13/2024   Exercise   Days per week of moderate/strenous exercise 1 day   Average minutes spent exercising at this level 20 min      (!) EXERCISE CONCERN      10/13/2024   Social Factors   Frequency of gathering with friends or relatives More than three times a week   Worry food won't last until get money to buy more No   Food not last or not have enough money for food? No   Do you have housing? (Housing is defined as stable permanent housing and does not include staying ouside in a car, in a tent, in an abandoned  building, in an overnight shelter, or couch-surfing.) Yes   Are you worried about losing your housing? No   Lack of transportation? No   Unable to get utilities (heat,electricity)? No            10/13/2024   Dental   Dentist two times every year? Yes            10/13/2024   TB Screening   Were you born outside of the [place]? No              Today's PHQ-2 Score:       5/9/2024     9:09 AM   PHQ-2 ( 1999 [employer])   Q1: Little interest or pleasure in doing things 0   Q2: Feeling down, depressed or hopeless 0   PHQ-2 Score 0   Q1: Little interest or pleasure in doing things Not at all   Q2: Feeling down, depressed or hopeless Not at all   PHQ-2 Score 0         10/13/2024   Substance Use   Alcohol more than 3/day or more than 7/wk Not Applicable   Do you use any other substances recreationally? No        Social History     Tobacco Use    Smoking status: Never    Smokeless tobacco: Never   Vaping Use    Vaping status: Never Used   Substance Use Topics    Alcohol use: Yes     Comment: 3/year    Drug use: No          Mammogram Screening - Patient under 40 years of age: Routine Mammogram Screening not recommended.         10/13/2024   STI Screening   New sexual partner(s) since last STI/HIV test? No        History of abnormal Pap smear: No - age 30-64 HPV with reflex Pap every 5 years recommended        Latest Ref Rng & Units 5/21/2018     3:10 PM 5/7/2018    10:28 AM 7/15/2015    12:00 AM   PAP / HPV   PAP (Historical)  NIL  NIL  NIL    HPV 16 DNA NEG^Negative Negative  Negative     HPV 18 DNA NEG^Negative Negative  Negative     Other HR HPV NEG^Negative Negative  Negative             10/13/2024   Contraception/Family Planning   Questions about contraception or family planning No           Reviewed and updated as needed this visit by Provider      Problems               Past Medical History:   Diagnosis Date    Flexural eczema     Intractable migraine without aura and without status migrainosus     Lactose intolerance      Melanoma (H) 10/2019    Ovarian cyst 2002    left    Thoracogenic scoliosis of thoracolumbar region      Past Surgical History:   Procedure Laterality Date    wisdom teeth extraction       OB History    Para Term  AB Living   3 2 2 0 1 1   SAB IAB Ectopic Multiple Live Births   0 0 0 0 1      # Outcome Date GA Lbr Kamlesh/2nd Weight Sex Type Anes PTL Lv   3 Term 19 40w4d 05:53 / 00:31 3.785 kg (8 lb 5.5 oz) F Vag-Spont None N ALLAN      Name: KAREN,FEMALE-CHRYSTAL      Apgar1: 9  Apgar5: 9   2 Term 11/15/17 40w6d 07:28 / 02:30 4.18 kg (9 lb 3.4 oz) F Vag-Spont Local, TOPICAL N       Name: Caridad      Apgar1: 8  Apgar5: 9   1 AB 16 7w0d    AB, INEVITAB        Lab work is in process  Labs reviewed in EPIC  BP Readings from Last 3 Encounters:   10/14/24 98/61   24 116/66   24 116/66    Wt Readings from Last 3 Encounters:   10/14/24 60 kg (132 lb 3.2 oz)   24 63.6 kg (140 lb 3.4 oz)   24 63.6 kg (140 lb 3.4 oz)                  Patient Active Problem List   Diagnosis    Congenital pectus excavatum    Plantar fasciitis    Cervicalgia    Bilateral carpal tunnel syndrome    Insufficient endocervical or transformation zone component in cervical specimen     (spontaneous vaginal delivery)    Malignant melanoma of skin of right leg (H)    Vertigo    Hypovitaminosis D    Chronic intractable headache, unspecified headache type    2019 novel coronavirus disease (COVID-19)    Menorrhagia with regular cycle    Dysmenorrhea    Hives    Urticaria    ACP (advance care planning)    Cervical cancer screening    Encounter for screening for other viral diseases    Hyperlipidemia LDL goal <130     Past Surgical History:   Procedure Laterality Date    wisdom teeth extraction         Social History     Tobacco Use    Smoking status: Never    Smokeless tobacco: Never   Substance Use Topics    Alcohol use: Yes     Comment: 3/year     Family History   Problem Relation Age of Onset     "Cerebrovascular Disease Maternal Grandmother         complications    Hypertension Maternal Grandmother     Back Pain Mother 55        disc herniation    Coronary Artery Disease Maternal Grandfather         s/p stents x 3    Lung Cancer Maternal Grandfather         +tobacco    Vertigo Father         lymes    Lactose Intolerance Sister     Emphysema Paternal Grandmother         +tobacco    Eczema Sister     Lactose Intolerance Sister     Emphysema Paternal Grandfather         +tobacco         Current Outpatient Medications   Medication Sig Dispense Refill    magnesium glycinate 100 MG CAPS capsule       EPINEPHrine (ANY BX GENERIC EQUIV) 0.3 MG/0.3ML injection 2-pack Inject 0.3 mLs (0.3 mg) into the muscle as needed for anaphylaxis Epipen brand May repeat one time in 5-15 minutes if response to initial dose is inadequate. (Patient not taking: Reported on 7/8/2024) 2 each 1     Allergies   Allergen Reactions    Blue Dyes (Parenteral)     Red Dye #40 (Allura Red)      Recent Labs   Lab Test 10/14/24  1256 01/25/21  1414 11/29/19  1021 11/29/19  1021 10/10/17  1108   LDL 93  --   --   --   --    HDL 70  --   --   --   --    TRIG 57  --   --   --   --    ALT 18 19  --  20  --    CR 0.84 0.82   < > 0.72  --    GFRESTIMATED >90 >90   < > >90  --    GFRESTBLACK  --  >90  --  >90  --    POTASSIUM 4.0 4.0   < > 3.8  --    TSH  --  1.49  --   --  1.30    < > = values in this interval not displayed.          Review of Systems  Constitutional, neuro, ENT, endocrine, pulmonary, cardiac, gastrointestinal, genitourinary, musculoskeletal, integument and psychiatric systems are negative, except as otherwise noted.     Objective    Exam  BP 98/61 (BP Location: Right arm, Patient Position: Sitting, Cuff Size: Adult Regular)   Pulse 94   Temp 99.1  F (37.3  C) (Tympanic)   Resp 16   Ht 1.702 m (5' 7\")   Wt 60 kg (132 lb 3.2 oz)   LMP 09/25/2024 (Exact Date)   SpO2 100%   BMI 20.71 kg/m     Estimated body mass index is 20.71 " "kg/m  as calculated from the following:    Height as of this encounter: 1.702 m (5' 7\").    Weight as of this encounter: 60 kg (132 lb 3.2 oz).    Physical Exam  GENERAL: alert and no distress  NECK: no adenopathy, no asymmetry, masses, or scars  RESP: lungs clear to auscultation - no rales, rhonchi or wheezes  CV: regular rate and rhythm, normal S1 S2, no S3 or S4, no murmur, click or rub, no peripheral edema  ABDOMEN: soft, nontender, no hepatosplenomegaly, no masses and bowel sounds normal  MS: no gross musculoskeletal defects noted, no edema  PSYCH: mentation appears normal, affect normal/bright        Signed Electronically by: Debby Floyd MD    "

## 2024-10-15 LAB — HCV AB SERPL QL IA: NONREACTIVE

## 2024-10-18 ENCOUNTER — TELEPHONE (OUTPATIENT)
Dept: FAMILY MEDICINE | Facility: OTHER | Age: 34
End: 2024-10-18

## 2024-10-18 LAB
BKR LAB AP GYN ADEQUACY: NORMAL
BKR LAB AP GYN INTERPRETATION: NORMAL
BKR LAB AP HPV REFLEX: NORMAL
BKR LAB AP PREVIOUS ABNORMAL: NORMAL
PATH REPORT.COMMENTS IMP SPEC: NORMAL
PATH REPORT.COMMENTS IMP SPEC: NORMAL
PATH REPORT.RELEVANT HX SPEC: NORMAL

## 2024-10-18 NOTE — TELEPHONE ENCOUNTER
Results requested: test results     Best number to reach patient at: [phone]     Best time to call patient: anytime soon    Send to care team in basket.

## 2024-10-21 ENCOUNTER — TELEPHONE (OUTPATIENT)
Dept: FAMILY MEDICINE | Facility: OTHER | Age: 34
End: 2024-10-21

## 2024-10-21 NOTE — TELEPHONE ENCOUNTER
11:01 AM    Reason for Call: Phone Call    Description: Patient requesting call back from nurse to discuss hearing aids. She states that for insurance to cover these, Dr. Floyd needs to order them. Please give her a call back to discuss.     Was an appointment offered for this call? No    Preferred method for responding to this message: Telephone Call  What is your phone number ?[phone]     If we cannot reach you directly, may we leave a detailed response at the number you provided? Yes    Can this message wait until your PCP/provider returns, if available today? Not applicable    Aimee Douglas

## 2024-10-21 NOTE — TELEPHONE ENCOUNTER
Attempted to call patient with results, left message for patient to call back or check message on [employer].

## 2024-10-22 NOTE — TELEPHONE ENCOUNTER
Spoke with patient she had an audiology test done downstairs and was told she needs hearing aids she is unsure if PCP is able to help or maybe get a referral for this.     Patient is wondering what is covered for insurance and any recommendations that you have for someone in [place].

## 2024-10-24 NOTE — TELEPHONE ENCOUNTER
Okay I sent this and a MyChart encounter from this patient to PCP [place] as well. She has a list of people that are covered under her insurance and wanted a recommendation for who to see as well as some other questions regarding this.

## 2024-10-24 NOTE — TELEPHONE ENCOUNTER
Sorry - but I have no idea who to go to or if needs hearing aides or not.  Pt to get a name of where she is going to get her hearing aids and I can send a referral if needed.

## 2024-10-24 NOTE — TELEPHONE ENCOUNTER
Patient is unsure if audiology downstairs is in her insurance network and it only includes places in [place].     Patient would like to be seen somewhere in [place] for this as that is what her insurance covers. Patient stated she will send a copy of the list through hint for you to look at as well to see who you recommend in [place].

## 2024-10-24 NOTE — TELEPHONE ENCOUNTER
Patient was able to find a provider at [facility]- Alba Andrade that is in her network and states she will follow up with her assistant for this.

## 2024-11-26 ENCOUNTER — OFFICE VISIT (OUTPATIENT)
Dept: CHIROPRACTIC MEDICINE | Facility: OTHER | Age: 34
End: 2024-11-26
Attending: CHIROPRACTOR
Payer: COMMERCIAL

## 2024-11-26 DIAGNOSIS — M54.2 CERVICALGIA: ICD-10-CM

## 2024-11-26 DIAGNOSIS — M99.02 SEGMENTAL AND SOMATIC DYSFUNCTION OF THORACIC REGION: ICD-10-CM

## 2024-11-26 DIAGNOSIS — M99.03 SEGMENTAL AND SOMATIC DYSFUNCTION OF LUMBAR REGION: ICD-10-CM

## 2024-11-26 DIAGNOSIS — M99.01 SEGMENTAL AND SOMATIC DYSFUNCTION OF CERVICAL REGION: Primary | ICD-10-CM

## 2024-11-26 PROCEDURE — 98941 CHIROPRACT MANJ 3-4 REGIONS: CPT | Mod: AT | Performed by: CHIROPRACTOR

## 2024-12-02 ENCOUNTER — OFFICE VISIT (OUTPATIENT)
Dept: CHIROPRACTIC MEDICINE | Facility: OTHER | Age: 34
End: 2024-12-02
Attending: CHIROPRACTOR
Payer: COMMERCIAL

## 2024-12-02 DIAGNOSIS — M99.03 SEGMENTAL AND SOMATIC DYSFUNCTION OF LUMBAR REGION: Primary | ICD-10-CM

## 2024-12-02 DIAGNOSIS — M99.02 SEGMENTAL AND SOMATIC DYSFUNCTION OF THORACIC REGION: ICD-10-CM

## 2024-12-02 DIAGNOSIS — M54.50 ACUTE BILATERAL LOW BACK PAIN WITHOUT SCIATICA: ICD-10-CM

## 2024-12-02 DIAGNOSIS — M99.01 SEGMENTAL AND SOMATIC DYSFUNCTION OF CERVICAL REGION: ICD-10-CM

## 2024-12-02 NOTE — PROGRESS NOTES
Subjective Finding:    Chief compalint: Patient presents with:  Neck Pain , Pain Scale: 3/10, Intensity: dull, Duration: 1 weeks, Radiating: no.    Date of injury:     Activities that the pain restricts:   Home/household/hobbies/social activities: Yes.  Work duties: No.  Sleep: No.  Makes symptoms better: rest.  Makes symptoms worse: cervical extension and cervical flexion.  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 No.  Work related: No.  Automobile related injury: No.    Objective and Assessment:    Posture Analysis:   High shoulder: .  Head tilt: .  High iliac crest: .  Head carriage: forward.  Thoracic Kyphosis: neutral.  Lumbar Lordosis: neutral.    Lumbar Range of Motion: .  Cervical Range of Motion: left rotation decreased and right rotation decreased.  Thoracic Range of Motion: .  Extremity Range of Motion: .    Palpation:   Traps: sharp pain, referred pain: no    Segmental dysfunction pre-treatment and treatment area: C2, C6, T4, and L5.    Assessment post-treatment:  Cervical: ROM increased.  Thoracic: ROM increased.  Lumbar: ROM increased.    Comments: .      Complicating Factors: .    Procedure(s):  CMT:  49996 Chiropractic manipulative treatment 3-4 regions performed   Cervical: Diversified, See above for level, Supine, Thoracic: Diversified, See above for level, Prone, and Lumbar: Diversified, See above for level, Side posture    Modalities:  None performed this visit    Therapeutic procedures:  None    Plan:  Treatment plan: PRN.  Instructed patient: stretch as instructed at visit.  Short term goals: increase ROM.  Long term goals: increase strength.  Prognosis: very good.

## 2024-12-03 ENCOUNTER — OFFICE VISIT (OUTPATIENT)
Dept: AUDIOLOGY | Facility: OTHER | Age: 34
End: 2024-12-03
Attending: AUDIOLOGIST
Payer: COMMERCIAL

## 2024-12-03 DIAGNOSIS — H90.3 SENSORINEURAL HEARING LOSS (SNHL) OF BOTH EARS: Primary | ICD-10-CM

## 2024-12-03 NOTE — PROGRESS NOTES
BACKGROUND:  Johana was seen today for consult regarding hearing aids. The patient notes difficulty with communication in a variety of listening situations and would like to explore possible benefit obtained via use of amplification.      Patient has received medical clearance for hearing aid use from Melanie Britt NP.  Johana is a binaural hearing aid candidate and will receive a Hearing Aid Recommendation today.    RESULTS:  Audiology Assistant reviewed below information:    Estimated insurance coverage for hearing aids reviewed.  [place] Department of Health form titled 'Legal rights and consumer information about purchasing a hearing instrument verbally reviewed and given to patient. Trial Period, cancellation fee, warranties highlighted. Patient risk factors have been provided to the patient in writing prior to the sale of the hearing aid per FDA regulation. The risk factors are also available in the User Instructional Booklet to be presented on the day of the hearing aid fitting.     Hearing aid recommendation provided by Audiologist.    Thru use of hearing aids patient would like to improve ability to hear:  where there are groups and noise.   to hear the television at a lower volume to enjoy this activity with other people.   Johana also reports trouble hearing in the car, at home, and on the telephone if there is not a volume control.      Discussed hearing aid styles, technology, features, and options at length with Johana.  Sample devices were shown. Pros/Cons of options reviewed.  Expectations for amplification discussed. .Also discussed the importance of binaural amplification for hearing speech in the presence of background noise and localizing the directions of sounds.  Wireless options were also discussed at length and sample devices were shown.       Hearing Aid Recommendations: Hearing Aid Recommendation reviewed with patient. Pt chose to proceed with order and Purchase Agreement  completed. Copies provided to patient.    Intent 1 or 2 best fits lifestyle. As mild hearing loss, new wearer, and for financial reasons she chooses Intent 3.      Hearing Aids:  Binaural Oticon Intent 3 miniIRTETT  Color: beige  Battery Size: rechargeable  Earmold/Tips: 2-85      RECOMMENDATIONS:  The 45 day trial period was explained to patient, and they expressed understanding. Ms. Duff signed the Hearing Aid Purchase Agreement and was given a copy, as well as details on her hearing aids. Patient was counseled that exact out of pocket amounts cannot be determined for hearing aid claims being sent to insurance. Any insurance coverage information presented to the patient is an estimate only, and is not a guarantee of payment. Patient has been advised to check with their own insurance.      Patient scheduled to return to clinic in 2 weeks for hearing aid fitting, programming and orientation.    Alba Andrade M.S.,[facility]-A  Audiologist #[id number]

## 2024-12-04 NOTE — PROGRESS NOTES
Subjective Finding:    Chief compalint: Patient presents with:  Back Pain , Pain Scale: 3/10, Intensity: dull, Duration: 2 weeks, Radiating: bilateral buttock.    Date of injury:     Activities that the pain restricts:   Home/household/hobbies/social activities: Yes.  Work duties: Yes.  Sleep: No.  Makes symptoms better: rest.  Makes symptoms worse: lumbar flexion.  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 No.  Work related: No.  Automobile related injury: No.    Objective and Assessment:    Posture Analysis:   High shoulder: .  Head tilt: .  High iliac crest: .  Head carriage: neutral.  Thoracic Kyphosis: neutral.  Lumbar Lordosis: forward.    Lumbar Range of Motion: flexion decreased and extension decreased.  Cervical Range of Motion: .  Thoracic Range of Motion: .  Extremity Range of Motion: .    Palpation:   Quad lumb: bilateral, referred pain: no    Segmental dysfunction pre-treatment and treatment area: C7, T5, L2, L4, and L5.    Assessment post-treatment:  Cervical: ROM increased.  Thoracic: ROM increased.  Lumbar: ROM increased.    Comments: .      Complicating Factors: .    Procedure(s):  CMT:  14183 Chiropractic manipulative treatment 3-4 regions performed   Cervical: Diversified, See above for level, Supine, Thoracic: Diversified, See above for level, Prone, and Lumbar: Diversified, See above for level, Side posture    Modalities:  None performed this visit    Therapeutic procedures:  None    Plan:  Treatment plan: PRN.  Instructed patient: stretch as instructed at visit.  Short term goals: increase ROM.  Long term goals: increase ADL.  Prognosis: very good.

## 2024-12-17 ENCOUNTER — OFFICE VISIT (OUTPATIENT)
Dept: AUDIOLOGY | Facility: OTHER | Age: 34
End: 2024-12-17
Attending: AUDIOLOGIST
Payer: COMMERCIAL

## 2024-12-17 DIAGNOSIS — H90.3 SENSORINEURAL HEARING LOSS (SNHL) OF BOTH EARS: Primary | ICD-10-CM

## 2024-12-17 NOTE — PROGRESS NOTES
AUDIOLOGY REPORT    SUBJECTIVE: Johana Duff, a 34 year old female, was seen in the Audiology Clinic at [facility] today for a Binaural hearing aid fitting. Previous results have revealed a bilateral  sensorineural hearing loss.     OBJECTIVE:  Prior to fitting, a hearing aid check was performed to ensure device functionality. The hearing aid conformity evaluation was completed.The hearing aids were placed and they provided a good fit. Real-ear-probe-microphone measurements were completed on the Vadio system and were a good match to DSL target with soft sounds audible, moderate sounds comfortable, and loud sounds below discomfort. UCLs are verified through maximum power output measures and demonstrate appropriate limiting of loud inputs.     12/17/2024            Hearing Device Info   Left Ear Make: Oticon   Left Ear Model #: Intent 3 miniRITE   Left Ear Style: BTE   Left HA Warranty End Date: 1/2/2028   Left HA Serial #: [id number]   Right Ear Make: Oticon   Right Ear Model #: Intent 3 miniRITE   Right Ear Style: BTE   Right HA Warranty End Date: 1/2/2028   Right HA Serial #: [id number]   Fitting Plan: Standard     ASSESSMENT: Hearing aid fitting completed today. Verification measures were performed. Patient and/or caregiver demonstrated ability to insert and remove hearing aids and adjust volume toggle.    Alba Andrade M.S., [facility]-A  Audiologist #[id number]      HEARING AID ORIENTATION/AUDIOLOGY ASSISTANT      Ms. Duff was oriented to proper hearing aid use, care, cleaning (no water, dry brush), batteries (size rechargeable, low-battery signal), aid insertion/removal, user booklet, warranty information, storage cases, and other hearing aid details. The patient confirmed understanding of hearing aid use and care, and showed proper insertion of hearing aid and batteries while in the office today. Ms. Duff reported good volume and sound quality today.    Katiana Ricks  Audiology  Assistant  [facility]  [phone]        PLAN: Ms. Duff will return for follow-up in 2-3 weeks for a hearing aid review appointment. Please call this clinic with questions regarding today s appointment.

## 2025-01-21 ENCOUNTER — OFFICE VISIT (OUTPATIENT)
Dept: CHIROPRACTIC MEDICINE | Facility: OTHER | Age: 35
End: 2025-01-21
Attending: CHIROPRACTOR
Payer: COMMERCIAL

## 2025-01-21 DIAGNOSIS — M99.01 SEGMENTAL AND SOMATIC DYSFUNCTION OF CERVICAL REGION: Primary | ICD-10-CM

## 2025-01-21 DIAGNOSIS — M54.2 CERVICALGIA: ICD-10-CM

## 2025-01-21 DIAGNOSIS — M99.02 SEGMENTAL AND SOMATIC DYSFUNCTION OF THORACIC REGION: ICD-10-CM

## 2025-01-21 DIAGNOSIS — M99.03 SEGMENTAL AND SOMATIC DYSFUNCTION OF LUMBAR REGION: ICD-10-CM

## 2025-01-21 PROCEDURE — 98941 CHIROPRACT MANJ 3-4 REGIONS: CPT | Mod: AT | Performed by: CHIROPRACTOR

## 2025-01-21 NOTE — PROGRESS NOTES
Subjective Finding:    Chief compalint: Patient presents with:  Back Pain: With neck pain   , Pain Scale: 3/10, Intensity: sharp, Duration: 2 days, Radiating: no.    Date of injury:     Activities that the pain restricts:   Home/household/hobbies/social activities: Yes.  Work duties: No.  Sleep: No.  Makes symptoms better: rest.  Makes symptoms worse: lumbar flexion.  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 No.  Work related: No.  Automobile related injury: No.    Objective and Assessment:    Posture Analysis:   High shoulder: .  Head tilt: .  High iliac crest: .  Head carriage: neutral.  Thoracic Kyphosis: neutral.  Lumbar Lordosis: forward.    Lumbar Range of Motion: extension decreased.  Cervical Range of Motion: .  Thoracic Range of Motion: .  Extremity Range of Motion: .    Palpation:   Quad lumb: bilateral, referred pain: no    Segmental dysfunction pre-treatment and treatment area: C5, C6, T5, and L5.    Assessment post-treatment:  Cervical: ROM increased.  Thoracic: ROM increased.  Lumbar: ROM increased.    Comments: .      Complicating Factors: .    Procedure(s):  CMT:  19239 Chiropractic manipulative treatment 3-4 regions performed   Cervical: Diversified, See above for level, Supine, Thoracic: Diversified, See above for level, Prone, and Lumbar: Diversified, See above for level, Side posture    Modalities:  None performed this visit    Therapeutic procedures:  None    Plan:  Treatment plan: PRN.  Instructed patient: stretch as instructed at visit.  Short term goals: increase ROM.  Long term goals: increase ADL.  Prognosis: very good.

## 2025-02-20 ENCOUNTER — OFFICE VISIT (OUTPATIENT)
Dept: CHIROPRACTIC MEDICINE | Facility: OTHER | Age: 35
End: 2025-02-20
Attending: CHIROPRACTOR
Payer: COMMERCIAL

## 2025-02-20 DIAGNOSIS — M99.03 SEGMENTAL AND SOMATIC DYSFUNCTION OF LUMBAR REGION: Primary | ICD-10-CM

## 2025-02-20 DIAGNOSIS — M99.02 SEGMENTAL AND SOMATIC DYSFUNCTION OF THORACIC REGION: ICD-10-CM

## 2025-02-20 DIAGNOSIS — M99.01 SEGMENTAL AND SOMATIC DYSFUNCTION OF CERVICAL REGION: ICD-10-CM

## 2025-02-20 DIAGNOSIS — M54.50 ACUTE BILATERAL LOW BACK PAIN WITHOUT SCIATICA: ICD-10-CM

## 2025-02-20 NOTE — PROGRESS NOTES
Subjective Finding:    Chief compalint: Patient presents with:  Back Pain , Pain Scale: 2/10, Intensity: sharp, Duration: 1 weeks, Radiating: no.    Date of injury:     Activities that the pain restricts:   Home/household/hobbies/social activities: Yes.  Work duties: No.  Sleep: No.  Makes symptoms better: rest.  Makes symptoms worse: lumbar flexion and cervical flexion.  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 No.  Work related: No.  Automobile related injury: No.    Objective and Assessment:    Posture Analysis:   High shoulder: .  Head tilt: .  High iliac crest: .  Head carriage: forward.  Thoracic Kyphosis: neutral.  Lumbar Lordosis: neutral.    Lumbar Range of Motion: flexion decreased and extension decreased.  Cervical Range of Motion: left rotation decreased and right rotation decreased.  Thoracic Range of Motion: .  Extremity Range of Motion: .    Palpation:   Traps: sharp pain, referred pain: no    Segmental dysfunction pre-treatment and treatment area: C2, T1, T4, and L4.    Assessment post-treatment:  Cervical: ROM increased.  Thoracic: ROM increased.  Lumbar: ROM increased.    Comments: .      Complicating Factors: .    Procedure(s):  CMT:  55963 Chiropractic manipulative treatment 3-4 regions performed   Cervical: Diversified, See above for level, Supine, Thoracic: Diversified, See above for level, Prone, and Lumbar: Diversified, See above for level, Side posture    Modalities:  None performed this visit    Therapeutic procedures:  None    Plan:  Treatment plan: PRN.  Instructed patient: walk 10 minutes.  Short term goals: increase ROM.  Long term goals: increase ADL.  Prognosis: very good.

## 2025-04-24 ENCOUNTER — OFFICE VISIT (OUTPATIENT)
Dept: CHIROPRACTIC MEDICINE | Facility: OTHER | Age: 35
End: 2025-04-24
Attending: CHIROPRACTOR
Payer: COMMERCIAL

## 2025-04-24 DIAGNOSIS — M99.01 SEGMENTAL AND SOMATIC DYSFUNCTION OF CERVICAL REGION: ICD-10-CM

## 2025-04-24 DIAGNOSIS — M99.02 SEGMENTAL AND SOMATIC DYSFUNCTION OF THORACIC REGION: ICD-10-CM

## 2025-04-24 DIAGNOSIS — M54.50 ACUTE BILATERAL LOW BACK PAIN WITHOUT SCIATICA: ICD-10-CM

## 2025-04-24 DIAGNOSIS — M99.03 SEGMENTAL AND SOMATIC DYSFUNCTION OF LUMBAR REGION: Primary | ICD-10-CM

## 2025-04-24 PROCEDURE — 98941 CHIROPRACT MANJ 3-4 REGIONS: CPT | Mod: AT | Performed by: CHIROPRACTOR

## 2025-04-28 NOTE — PROGRESS NOTES
Subjective Finding:    Chief compalint: Patient presents with:  Neck Pain: With back pain   , Pain Scale: 3/10, Intensity: sharp, Duration: 1 weeks, Radiating: bilateral buttock.    Date of injury:     Activities that the pain restricts:   Home/household/hobbies/social activities: Yes.  Work duties: Yes.  Sleep: No.  Makes symptoms better: rest.  Makes symptoms worse: lumbar flexion.  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 No.  Work related: No.  Automobile related injury: No.    Objective and Assessment:    Posture Analysis:   High shoulder: .  Head tilt: .  High iliac crest: .  Head carriage: neutral.  Thoracic Kyphosis: neutral.  Lumbar Lordosis: neutral.    Lumbar Range of Motion: extension decreased.  Cervical Range of Motion: left rotation decreased and right rotation decreased.  Thoracic Range of Motion: .  Extremity Range of Motion: .    Palpation:   Quad lumb: bilateral, referred pain: no    Segmental dysfunction pre-treatment and treatment area: C5, C6, T5, and L4.    Assessment post-treatment:  Cervical: ROM increased.  Thoracic: ROM increased.  Lumbar: ROM increased.    Comments: .      Complicating Factors: .    Procedure(s):  [facility]:  58368 Chiropractic manipulative treatment 3-4 regions performed   Cervical: Diversified, See above for level, Supine, Thoracic: Diversified, See above for level, Prone, and Lumbar: Diversified, See above for level, Side posture    Modalities:  None performed this visit    Therapeutic procedures:  None    Plan:  Treatment plan: PRN.  Instructed patient: walk 10 minutes.  Short term goals: increase ROM.  Long term goals: increase ADL.  Prognosis: very good.

## 2025-06-02 ENCOUNTER — OFFICE VISIT (OUTPATIENT)
Dept: CHIROPRACTIC MEDICINE | Facility: OTHER | Age: 35
End: 2025-06-02
Attending: CHIROPRACTOR
Payer: COMMERCIAL

## 2025-06-02 DIAGNOSIS — M99.01 SEGMENTAL AND SOMATIC DYSFUNCTION OF CERVICAL REGION: ICD-10-CM

## 2025-06-02 DIAGNOSIS — M99.02 SEGMENTAL AND SOMATIC DYSFUNCTION OF THORACIC REGION: ICD-10-CM

## 2025-06-02 DIAGNOSIS — M54.50 ACUTE BILATERAL LOW BACK PAIN WITHOUT SCIATICA: ICD-10-CM

## 2025-06-02 DIAGNOSIS — M99.03 SEGMENTAL AND SOMATIC DYSFUNCTION OF LUMBAR REGION: Primary | ICD-10-CM

## 2025-06-02 PROCEDURE — 98941 CHIROPRACT MANJ 3-4 REGIONS: CPT | Mod: AT | Performed by: CHIROPRACTOR

## 2025-06-10 NOTE — PROGRESS NOTES
Subjective Finding:    Chief compalint: Patient presents with:  Back Pain , Pain Scale: 3/10, Intensity: dull, Duration: 1 months, Radiating: no.    Date of injury:     Activities that the pain restricts:   Home/household/hobbies/social activities: Yes.  Work duties: Yes.  Sleep: No.  Makes symptoms better: rest.  Makes symptoms worse: lumbar extension.  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 No.  Work related: No.  Automobile related injury: No.    Objective and Assessment:    Posture Analysis:   High shoulder: .  Head tilt: .  High iliac crest: .  Head carriage: neutral.  Thoracic Kyphosis: neutral.  Lumbar Lordosis: forward.    Lumbar Range of Motion: extension decreased.  Cervical Range of Motion: .  Thoracic Range of Motion: .  Extremity Range of Motion: .    Palpation:   Quad lumb: bilateral, referred pain: no    Segmental dysfunction pre-treatment and treatment area: C4, C5, T8, and L5.    Assessment post-treatment:  Cervical: ROM increased.  Thoracic: ROM increased.  Lumbar: ROM increased.    Comments: .      Complicating Factors: .    Procedure(s):  CMT:  91053 Chiropractic manipulative treatment 3-4 regions performed   Cervical: Diversified, See above for level, Supine, Thoracic: Diversified, See above for level, Prone, and Lumbar: Diversified, See above for level, Side posture    Modalities:  None performed this visit    Therapeutic procedures:  None    Plan:  Treatment plan: PRN.  Instructed patient: stretch as instructed at visit.  Short term goals: reduce pain.  Long term goals: increase ADL.  Prognosis: very good.

## 2025-07-31 DIAGNOSIS — H91.93 DECREASED HEARING OF BOTH EARS: Primary | ICD-10-CM

## 2025-08-27 ENCOUNTER — OFFICE VISIT (OUTPATIENT)
Dept: CHIROPRACTIC MEDICINE | Facility: OTHER | Age: 35
End: 2025-08-27
Attending: CHIROPRACTOR
Payer: COMMERCIAL

## 2025-08-27 DIAGNOSIS — M54.2 CERVICALGIA: ICD-10-CM

## 2025-08-27 DIAGNOSIS — M99.01 SEGMENTAL AND SOMATIC DYSFUNCTION OF CERVICAL REGION: Primary | ICD-10-CM

## 2025-08-27 DIAGNOSIS — M99.02 SEGMENTAL AND SOMATIC DYSFUNCTION OF THORACIC REGION: ICD-10-CM

## 2025-08-27 DIAGNOSIS — M99.03 SEGMENTAL AND SOMATIC DYSFUNCTION OF LUMBAR REGION: ICD-10-CM

## 2025-08-27 PROCEDURE — 98941 CHIROPRACT MANJ 3-4 REGIONS: CPT | Mod: AT | Performed by: CHIROPRACTOR
